# Patient Record
Sex: MALE | Race: WHITE | Employment: FULL TIME | URBAN - METROPOLITAN AREA
[De-identification: names, ages, dates, MRNs, and addresses within clinical notes are randomized per-mention and may not be internally consistent; named-entity substitution may affect disease eponyms.]

---

## 2017-01-04 ENCOUNTER — OFFICE VISIT (OUTPATIENT)
Dept: FAMILY MEDICINE CLINIC | Age: 47
End: 2017-01-04

## 2017-01-04 VITALS
DIASTOLIC BLOOD PRESSURE: 70 MMHG | HEART RATE: 61 BPM | HEIGHT: 74 IN | BODY MASS INDEX: 26.82 KG/M2 | SYSTOLIC BLOOD PRESSURE: 110 MMHG | TEMPERATURE: 97.1 F | WEIGHT: 209 LBS | OXYGEN SATURATION: 100 % | RESPIRATION RATE: 16 BRPM

## 2017-01-04 DIAGNOSIS — H91.91 HEARING LOSS, RIGHT: ICD-10-CM

## 2017-01-04 DIAGNOSIS — Z23 ENCOUNTER FOR IMMUNIZATION: ICD-10-CM

## 2017-01-04 DIAGNOSIS — E78.2 MIXED HYPERLIPIDEMIA: ICD-10-CM

## 2017-01-04 DIAGNOSIS — H93.11 TINNITUS AURIUM, RIGHT: ICD-10-CM

## 2017-01-04 DIAGNOSIS — E03.9 ACQUIRED HYPOTHYROIDISM: Primary | ICD-10-CM

## 2017-01-04 NOTE — PROGRESS NOTES
Chief Complaint   Patient presents with    Thyroid Problem     lab result follow up     Cholesterol Problem     lab result follow up      1. Have you been to the ER, urgent care clinic since your last visit? Hospitalized since your last visit? No    2. Have you seen or consulted any other health care providers outside of the [address] [place] [address] since your last visit? Include any pap smears or colon screening.  No

## 2017-01-04 NOTE — MR AVS SNAPSHOT
Visit Information Date & Time Provider Department Dept. Phone Encounter #  
 1/4/2017 10:45 AM Philip Villar MD [address] [place] [address] [phone] [id number] Follow-up Instructions Return in about 3 months (around 4/4/2017) for hyperlipidemia follow up. Your Appointments 9/13/2017  9:00 AM  
COMPLETE PHYSICAL with Philip Villar MD  
[facility]) Appt Note: complete fasting physical  
 [address] [address]  
[phone]  
  
   
 [address] [address] Upcoming Health Maintenance Date Due DTaP/Tdap/Td series (2 - Td) 1/4/2027 Allergies as of 1/4/2017  Review Complete On: 1/4/2017 By: Philip Villar MD  
 No Known Allergies Current Immunizations  Reviewed on 1/4/2017 Name Date Influenza Vaccine (Quad) PF 1/4/2017 Td, Adsorbed PF 8/31/2016 Reviewed by Johna Cushing, LPN on 9/6/7751 at 29:62 AM  
You Were Diagnosed With   
  
 Codes Comments Acquired hypothyroidism    -  Primary ICD-10-CM: E03.9 ICD-9-CM: 244.9 Mixed hyperlipidemia     ICD-10-CM: E78.2 ICD-9-CM: 272.2 Tinnitus aurium, right     ICD-10-CM: H93.11 ICD-9-CM: 388.31 Hearing loss, right     ICD-10-CM: H91.91 
ICD-9-CM: 389.9 Encounter for immunization     ICD-10-CM: R09 ICD-9-CM: V03.89 Vitals BP Pulse Temp Resp Height(growth percentile) Weight(growth percentile) 110/70 (BP 1 Location: Left arm, BP Patient Position: Sitting) 61 97.1 °F (36.2 °C) (Oral) 16 6' 2\" (1.88 m) 209 lb (94.8 kg) SpO2 BMI Smoking Status 100% 26.83 kg/m2 Never Smoker Vitals History BMI and BSA Data Body Mass Index Body Surface Area  
 26.83 kg/m 2 2.22 m 2 Preferred Pharmacy Pharmacy Name Phone [id number] [place], [address] [facility] [phone] Your Updated Medication List  
  
 This list is accurate as of: 1/4/17 11:51 AM.  Always use your most recent med list.  
  
  
  
  
 atorvastatin 10 mg tablet Commonly known as:  LIPITOR Take 1 Tab by mouth daily. levothyroxine 25 mcg tablet Commonly known as:  SYNTHROID Take 1 Tab by mouth Daily (before breakfast). Follow-up Instructions Return in about 3 months (around 4/4/2017) for hyperlipidemia follow up. To-Do List   
 01/04/2017 Imaging:  MRI BRAIN WO CONT   
  
 02/13/2017 Lab:  LIPID PANEL   
  
 02/13/2017 Lab:  METABOLIC PANEL, COMPREHENSIVE Referral Information Referral ID Referred By Referred To  
  
 [id number] Sofia Wick, [address] [facility] Endocrinology   Osteoporosis [place], [address] [facility] Phone: [phone] Fax: [phone] Visits Status Start Date End Date 1 New Request 1/4/17 1/4/18 If your referral has a status of pending review or denied, additional information will be sent to support the outcome of this decision. Introducing [facility]! Dear Devon Dumont: Thank you for requesting a [email or web address].[employer] account. Our records indicate that you already have an active [email or web address] account. You can access your account anytime at [email or web address] Did you know that you can access your hospital and ER discharge instructions at any time in [email or web address]? You can also review all of your test results from your hospital stay or ER visit. Additional Information If you have questions, please visit the Frequently Asked Questions section of the [email or web address] website at [email or web address] Remember, [email or web address] is NOT to be used for urgent needs. For medical emergencies, dial 911. Now available from your iPhone and Android! Please provide this summary of care documentation to your next provider. Your primary care clinician is listed as David Renoer. If you have any questions after today's visit, please call [phone].

## 2017-01-04 NOTE — PROGRESS NOTES
HISTORY OF PRESENT ILLNESS  Mamie Soulier is a 55 y.o. male. Blood pressure 110/70, pulse 61, temperature 97.1 °F (36.2 °C), temperature source Oral, resp. rate 16, height 6' 2\" (1.88 m), weight 209 lb (94.8 kg), SpO2 100 %. Body mass index is 26.83 kg/(m^2). Chief Complaint   Patient presents with    Thyroid Problem     lab result follow up     Cholesterol Problem     lab result follow up       HPI   Mamie Soulier 55 y.o. male  presents to the office today for a follow up. Bp at office today 110/70. Hyperlipidemia: Lipid panel on 11/17/16 notable for total cholesterol 244, , HDL 48, and triglycerides 126. Pt continues with Lipitor 10 mg daily and started this medication two months ago. LDL is elevated at 171 per labs on 11/17/16. Advised pt to continue with current regimen and will reassess his levels in two months. Hypothyroidism: Last TSH 4.490 per labs on 11/17/16. Pt continues with Synthroid 25 mcg daily. He notes he still has fatigue, but his cognitive issues has improved. Pt notes he has scheduled appointment with endocrinology (Dr. Dariel Hale) on 02/09/17. Thyroid is presumed stable, will assess levels today. BL tinnitus: Pt notes worsening R>L tinnitus. This has been ongoing for awhile and pt also hears a \"chirping\" noise in his right ear. He has seen Dr. Alice Evans (ENT) for evaluation. Pt advised to complete brain MRI to rule out any abnormality. Current Outpatient Prescriptions   Medication Sig Dispense Refill    levothyroxine (SYNTHROID) 25 mcg tablet Take 1 Tab by mouth Daily (before breakfast). 90 Tab 1    atorvastatin (LIPITOR) 10 mg tablet Take 1 Tab by mouth daily. 90 Tab 1     No Known Allergies  History reviewed. No pertinent past medical history.   Past Surgical History   Procedure Laterality Date    Hx vasectomy       Family History   Problem Relation Age of Onset    High Cholesterol Father     Migraines Sister     Thyroid Disease Sister      Social History   Substance Use Topics    Smoking status: Never Smoker    Smokeless tobacco: Never Used    Alcohol use Yes        Review of Systems   Constitutional: Positive for malaise/fatigue. HENT: Positive for tinnitus (R>L). Eyes: Negative for blurred vision. Respiratory: Negative for shortness of breath. Cardiovascular: Negative for chest pain and leg swelling. Musculoskeletal: Negative. Neurological: Negative. Negative for dizziness and headaches. All other systems reviewed and are negative. Physical Exam   Constitutional: He is oriented to person, place, and time. He appears well-developed and well-nourished. HENT:   Head: Normocephalic and atraumatic. Neck: Carotid bruit is not present. Cardiovascular: Normal rate, regular rhythm, normal heart sounds and intact distal pulses. Exam reveals no gallop and no friction rub. No murmur heard. Pulmonary/Chest: Effort normal and breath sounds normal. No respiratory distress. He has no wheezes. He has no rales. He exhibits no tenderness. Neurological: He is alert and oriented to person, place, and time. Psychiatric: He has a normal mood and affect. His behavior is normal. Judgment and thought content normal.   Nursing note and vitals reviewed. ASSESSMENT and PLAN  Chau Sargent was seen today for thyroid problem and cholesterol problem. Diagnoses and all orders for this visit:    Acquired hypothyroidism  -     REFERRAL TO ENDOCRINOLOGY (Dr. Elkin Roman)   -     TSH 3RD GENERATION  - Thyroid is presumed stable, will assess levels today. Pt has scheduled appointment with endocrinology (Dr. Jennifer Spears) for February 2017. Mixed hyperlipidemia  -     LIPID PANEL; Future  -     METABOLIC PANEL, COMPREHENSIVE; Future  - LDL is elevated at 171 per labs on 11/17/16. Will assess levels in two months. Continue with Lipitor.      Tinnitus aurium, right  -     MRI BRAIN WO CONT; Future  - Will complete brain MRI to rule out any abnormality    Hearing loss, right  -     MRI BRAIN WO CONT; Future  - See above    Encounter for immunization  -     Influenza virus vaccine (QUADRIVALENT PRES FREE SYRINGE) IM 3 years and older      Follow-up Disposition:  Return in about 3 months (around 4/4/2017) for hyperlipidemia follow up. Medication risks/benefits/costs/interactions/alternatives discussed with patient. Advised patient to call back or return to office if symptoms worsen/change/persist.  If patient cannot reach us or should anything more severe/urgent arise he/she should proceed directly to the nearest emergency department. Discussed expected course/resolution/complications of diagnosis in detail with patient. Patient given a written after visit summary which includes her diagnoses, current medications and vitals. Patient expressed understanding with the diagnosis and plan. Written by meek Giron, as dictated by Jay Morel M.D.    I have reviewed and agree with the above note and have made corrections where appropriate, Dr. Pauline Kawasaki, MD

## 2017-01-05 LAB — TSH SERPL DL<=0.005 MIU/L-ACNC: 4.44 UIU/ML (ref 0.45–4.5)

## 2017-01-05 NOTE — PROGRESS NOTES
[phone] ([place]) attempted to call patient no answer left message to call us back in regards to his labs

## 2017-01-05 NOTE — PROGRESS NOTES
Increase synthroid to 50 mcg daily  Please call in #30 tabs  5 Refills (name brand only)    Ask him to double up on his remaining tabs  Make sure pt gets name brand    Pt needs to have TSH rechecked in 4 weeks

## 2017-01-10 ENCOUNTER — HOSPITAL ENCOUNTER (OUTPATIENT)
Dept: MRI IMAGING | Age: 47
Discharge: HOME | End: 2017-01-10
Payer: COMMERCIAL

## 2017-01-10 DIAGNOSIS — H93.11 TINNITUS AURIUM, RIGHT: ICD-10-CM

## 2017-01-10 DIAGNOSIS — H91.91 HEARING LOSS, RIGHT: ICD-10-CM

## 2017-01-10 PROCEDURE — 70551 MRI BRAIN STEM W/O DYE: CPT

## 2017-01-10 NOTE — PROGRESS NOTES
Normal MRI brain but if symptoms continue pt needs to see ENT again we may need to do MRI with contrast

## 2017-01-12 ENCOUNTER — TELEPHONE (OUTPATIENT)
Dept: FAMILY MEDICINE CLINIC | Age: 47
End: 2017-01-12

## 2017-01-13 RX ORDER — LEVOTHYROXINE SODIUM 50 UG/1
50 TABLET ORAL
Qty: 30 TAB | Refills: 5 | Status: SHIPPED | OUTPATIENT
Start: 2017-01-13 | End: 2017-05-10

## 2017-01-13 NOTE — PROGRESS NOTES
Called pt, returned call, adv of MRI results and to follow up with ENT for continuing tinnitis. Patient/ Caller given an opportunity to ask questions, repeated information, and verbalized understanding.

## 2017-01-13 NOTE — PROGRESS NOTES
Patient returned call spoke to him regarding MRI and doing reflex order of Synthroid, not levothyroxine, [facility]. Patient/ Caller given an opportunity to ask questions, repeated information, and verbalized understanding.

## 2017-02-03 DIAGNOSIS — E78.2 MIXED HYPERLIPIDEMIA: ICD-10-CM

## 2017-02-04 LAB
ALBUMIN SERPL-MCNC: 4.7 G/DL (ref 3.5–5.5)
ALBUMIN/GLOB SERPL: 2.1 {RATIO} (ref 1.1–2.5)
ALP SERPL-CCNC: 55 IU/L (ref 39–117)
ALT SERPL-CCNC: 25 IU/L (ref 0–44)
AST SERPL-CCNC: 21 IU/L (ref 0–40)
BILIRUB SERPL-MCNC: 0.4 MG/DL (ref 0–1.2)
BUN SERPL-MCNC: 16 MG/DL (ref 6–24)
BUN/CREAT SERPL: 18 (ref 9–20)
CALCIUM SERPL-MCNC: 9.7 MG/DL (ref 8.7–10.2)
CHLORIDE SERPL-SCNC: 102 MMOL/L (ref 96–106)
CO2 SERPL-SCNC: 24 MMOL/L (ref 18–29)
CREAT SERPL-MCNC: 0.91 MG/DL (ref 0.76–1.27)
GLOBULIN SER CALC-MCNC: 2.2 G/DL (ref 1.5–4.5)
GLUCOSE SERPL-MCNC: 80 MG/DL (ref 65–99)
POTASSIUM SERPL-SCNC: 4.1 MMOL/L (ref 3.5–5.2)
PROT SERPL-MCNC: 6.9 G/DL (ref 6–8.5)
SODIUM SERPL-SCNC: 144 MMOL/L (ref 134–144)

## 2017-05-10 ENCOUNTER — OFFICE VISIT (OUTPATIENT)
Dept: FAMILY MEDICINE CLINIC | Age: 47
End: 2017-05-10

## 2017-05-10 VITALS
BODY MASS INDEX: 27.23 KG/M2 | RESPIRATION RATE: 15 BRPM | TEMPERATURE: 98.6 F | HEART RATE: 39 BPM | WEIGHT: 212.2 LBS | HEIGHT: 74 IN | SYSTOLIC BLOOD PRESSURE: 110 MMHG | DIASTOLIC BLOOD PRESSURE: 83 MMHG | OXYGEN SATURATION: 98 %

## 2017-05-10 DIAGNOSIS — E78.2 MIXED HYPERLIPIDEMIA: Primary | ICD-10-CM

## 2017-05-10 DIAGNOSIS — E66.3 OVERWEIGHT (BMI 25.0-29.9): ICD-10-CM

## 2017-05-10 DIAGNOSIS — F98.8 ADD (ATTENTION DEFICIT DISORDER): ICD-10-CM

## 2017-05-10 DIAGNOSIS — E03.9 ACQUIRED HYPOTHYROIDISM: ICD-10-CM

## 2017-05-10 RX ORDER — LEVOTHYROXINE SODIUM 100 UG/1
TABLET ORAL
Refills: 3 | COMMUNITY
Start: 2017-04-19 | End: 2017-11-29

## 2017-05-10 NOTE — MR AVS SNAPSHOT
Visit Information Date & Time Provider Department Dept. Phone Encounter #  
 5/10/2017 12:15 PM Jean-Claude Rios MD [address] [place] [address] [phone] [id number] Follow-up Instructions Return in about 1 month (around 6/10/2017) for ADD. Your Appointments 9/13/2017  9:00 AM  
COMPLETE PHYSICAL with Jean-Claude Rios MD  
[facility]) Appt Note: complete fasting physical  
 [address] [address]  
[phone]  
  
   
 [address] [address] Upcoming Health Maintenance Date Due INFLUENZA AGE 9 TO ADULT 8/1/2017 DTaP/Tdap/Td series (2 - Td) 1/4/2027 Allergies as of 5/10/2017  Review Complete On: 5/10/2017 By: Jean-Claude Rios MD  
 No Known Allergies Current Immunizations  Reviewed on 1/4/2017 Name Date Influenza Vaccine (Quad) PF 1/4/2017 Td, Adsorbed PF 8/31/2016 Not reviewed this visit You Were Diagnosed With   
  
 Codes Comments Mixed hyperlipidemia    -  Primary ICD-10-CM: X05.6 ICD-9-CM: 272.2 Acquired hypothyroidism     ICD-10-CM: E03.9 ICD-9-CM: 244.9 ADD (attention deficit disorder)     ICD-10-CM: F98.8 ICD-9-CM: 314.00 Overweight (BMI 25.0-29. 9)     ICD-10-CM: N58.0 ICD-9-CM: 278.02 Vitals BP Pulse Temp Resp Height(growth percentile) Weight(growth percentile) 110/83 (BP 1 Location: Left arm, BP Patient Position: Sitting) (!) 39 98.6 °F (37 °C) (Oral) 15 6' 2\" (1.88 m) 212 lb 3.2 oz (96.3 kg) SpO2 BMI Smoking Status 98% 27.24 kg/m2 Never Smoker Vitals History BMI and BSA Data Body Mass Index Body Surface Area  
 27.24 kg/m 2 2.24 m 2 Preferred Pharmacy Pharmacy Name Phone [id number], [address] [place] [address] [phone] Your Updated Medication List  
  
   
 This list is accurate as of: 5/10/17  1:20 PM.  Always use your most recent med list.  
  
  
  
  
 atorvastatin 10 mg tablet Commonly known as:  LIPITOR Take 1 Tab by mouth daily. Lisdexamfetamine 40 mg capsule Commonly known as:  VYVANSE Take 1 Cap (40 mg total) by mouth daily. Max Daily Amount: 40 mg  
  
 UNITHROID 100 mcg tablet Generic drug:  levothyroxine TAKE 1 TABLET BY MOUTH DAILY Prescriptions Printed Refills Lisdexamfetamine (VYVANSE) 40 mg capsule 0 Sig: Take 1 Cap (40 mg total) by mouth daily. Max Daily Amount: 40 mg  
 Class: Print Route: Oral  
  
Follow-up Instructions Return in about 1 month (around 6/10/2017) for ADD. [facility] & [facility] SERVICES! Dear Garry Walker: Thank you for requesting a Scopix account. Our records indicate that you already have an active Scopix account. You can access your account anytime at [email or web address]. [email or web address] / [employer] Did you know that you can access your hospital and ER discharge instructions at any time in Scopix? You can also review all of your test results from your hospital stay or ER visit. Additional Information If you have questions, please visit the Frequently Asked Questions section of the Scopix website at [email or web address] / [employer]/. Remember, Scopix is NOT to be used for urgent needs. For medical emergencies, dial 911. Now available from your iPhone and Android! Please provide this summary of care documentation to your next provider. Your primary care clinician is listed as Trever Gill. If you have any questions after today's visit, please call [phone].

## 2017-05-10 NOTE — PROGRESS NOTES
HISTORY OF PRESENT ILLNESS  Rey Bills is a 55 y.o. male. Blood pressure 110/83, pulse (!) 39, temperature 98.6 °F (37 °C), temperature source Oral, resp. rate 15, height 6' 2\" (1.88 m), weight 212 lb 3.2 oz (96.3 kg), SpO2 98 %. Body mass index is 27.24 kg/(m^2). Chief Complaint   Patient presents with    Cholesterol Problem      HPI   Rey Bills 55 y.o. male  presents to the office today for a follow up on chronic conditions. Bp at office today 110/83. Hyperlipidemia: Lipid panel on 11/17/16 notable for total cholesterol 244, , HDL 48, and triglycerides 126. He notes he has been off his Lipitor for past few months. He notes completing recent lipid panel with his endocrinologist, but states he does not remember the levels. LDL is elevated at 171 per labs on 11/17/16. Pt has been off his Lipitor in past few months due to his thyroid issues. He agrees to restart the medication. I have also advised pt to work on weight loss through diet and exercise. Hypothyroidism/Hashimoto's thyroiditis: Pt notes he has seen endocrinology (Dr. Oneida Gayle) on 03/23/17. Pt was switched from Synthroid to Unithroid 100 mcg daily. His thyroid function was stable and pt notes he will complete repeat thyroid ultrasound in two months. ADD: Pt notes history of focus and concentration issues. He has noticed trouble processing things and staying on track especially at work. He has seen psychologist (Dr. 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 I have discussed with pt on treatment options for ADD and pt agrees to start Vyvanse 40 mg daily. Reviewed with him on benefits, risks, and side effects of this medication. Pt will follow up with OV in one month.        Current Outpatient Prescriptions   Medication Sig Dispense Refill    UNITHROID 100 mcg tablet TAKE 1 TABLET BY MOUTH DAILY  3    Lisdexamfetamine (VYVANSE) 40 mg capsule Take 1 Cap (40 mg total) by mouth daily. Max Daily Amount: 40 mg 30 Cap 0    atorvastatin (LIPITOR) 10 mg tablet Take 1 Tab by mouth daily. 90 Tab 1     No Known Allergies  No past medical history on file. Past Surgical History:   Procedure Laterality Date    HX VASECTOMY       Family History   Problem Relation Age of Onset    High Cholesterol Father    Sona Cramer Migraines Sister     Thyroid Disease Sister      Social History   Substance Use Topics    Smoking status: Never Smoker    Smokeless tobacco: Never Used    Alcohol use Yes        Review of Systems   Constitutional: Negative. Negative for malaise/fatigue. Eyes: Negative for blurred vision. Respiratory: Negative for shortness of breath. Cardiovascular: Negative for chest pain and leg swelling. Musculoskeletal: Negative. Neurological: Negative. Negative for dizziness and headaches. All other systems reviewed and are negative. Physical Exam   Constitutional: He is oriented to person, place, and time. He appears well-developed and well-nourished. HENT:   Head: Normocephalic and atraumatic. Neck: Carotid bruit is not present. Cardiovascular: Normal rate, regular rhythm, normal heart sounds and intact distal pulses. Exam reveals no gallop and no friction rub. No murmur heard. Pulmonary/Chest: Effort normal and breath sounds normal. No respiratory distress. He has no wheezes. He has no rales. He exhibits no tenderness. Neurological: He is alert and oriented to person, place, and time. Psychiatric: He has a normal mood and affect. His behavior is normal. Judgment and thought content normal.   Nursing note and vitals reviewed. ASSESSMENT and PLAN  Zeynep Pelayo was seen today for cholesterol problem. Diagnoses and all orders for this visit:    Mixed hyperlipidemia  LDL is elevated at 171 per labs on 11/17/17. Pt has been off Lipitor in past few months due to his thyroid issues.  He agrees to restart the medication and will work on diet and exercise for improvement. Acquired hypothyroidism  Thyroid is presumed stable. Advised pt to continue follow up with endocrinology (Dr. Daphnie Ruby) as advised. ADD (attention deficit disorder)  -     Lisdexamfetamine (VYVANSE) 40 mg capsule; Take 1 Cap (40 mg total) by mouth daily. Max Daily Amount: 40 mg  - Will start pt on Vyvanse. Reviewed benefits, risks, and side effects of this medication. Pt will follow up with OV in one month. Overweight (BMI 25.0-29. 9)  I have reviewed/discussed the above normal BMI with the patient. I have recommended the following interventions: dietary management education, guidance, and counseling, encourage exercise, monitor weight and prescribed dietary intake . Follow-up Disposition:  Return in about 1 month (around 6/10/2017) for ADD. Medication risks/benefits/costs/interactions/alternatives discussed with patient. Advised patient to call back or return to office if symptoms worsen/change/persist.  If patient cannot reach us or should anything more severe/urgent arise he/she should proceed directly to the nearest emergency department. Discussed expected course/resolution/complications of diagnosis in detail with patient. Patient given a written after visit summary which includes her diagnoses, current medications and vitals. Patient expressed understanding with the diagnosis and plan. Written by meek Ramos, as dictated by Tate Jackson M.D.    I have reviewed and agree with the above note and have made corrections where appropriate, Dr. Trish Olguin MD

## 2017-05-10 NOTE — PROGRESS NOTES
Chief Complaint   Patient presents with    Cholesterol Problem       1. Have you been to the ER, urgent care clinic since your last visit? Hospitalized since your last visit? No    2. Have you seen or consulted any other health care providers outside of the [address] [place] [address] since your last visit? Include any pap smears or colon screening. No    Body mass index is 27.24 kg/(m^2).

## 2017-05-11 ENCOUNTER — TELEPHONE (OUTPATIENT)
Dept: FAMILY MEDICINE CLINIC | Age: 47
End: 2017-05-11

## 2017-05-11 NOTE — TELEPHONE ENCOUNTER
PA form for Vyvanse sent to [facility] via covermymeds and it was approve    Notified patient via my chart Vyvanse approved by his insurance

## 2017-06-12 ENCOUNTER — OFFICE VISIT (OUTPATIENT)
Dept: FAMILY MEDICINE CLINIC | Age: 47
End: 2017-06-12

## 2017-06-12 VITALS
HEART RATE: 80 BPM | HEIGHT: 74 IN | TEMPERATURE: 98.6 F | DIASTOLIC BLOOD PRESSURE: 72 MMHG | OXYGEN SATURATION: 97 % | SYSTOLIC BLOOD PRESSURE: 113 MMHG | BODY MASS INDEX: 26.34 KG/M2 | RESPIRATION RATE: 15 BRPM | WEIGHT: 205.2 LBS

## 2017-06-12 DIAGNOSIS — E03.9 ACQUIRED HYPOTHYROIDISM: ICD-10-CM

## 2017-06-12 DIAGNOSIS — E78.2 MIXED HYPERLIPIDEMIA: ICD-10-CM

## 2017-06-12 DIAGNOSIS — F98.8 ADD (ATTENTION DEFICIT DISORDER): Primary | ICD-10-CM

## 2017-06-12 NOTE — MR AVS SNAPSHOT
Visit Information Date & Time Provider Department Dept. Phone Encounter #  
 6/12/2017  2:45 PM Mario Fajardo MD [address] [place] [address] [phone] [id number] Follow-up Instructions Return in about 1 month (around 7/12/2017) for ADD. Your Appointments 9/13/2017  9:00 AM  
COMPLETE PHYSICAL with Mario Fajardo MD  
[facility]) Appt Note: complete fasting physical  
 [address] [address]  
[phone]  
  
   
 [address] [address] Upcoming Health Maintenance Date Due INFLUENZA AGE 9 TO ADULT 8/1/2017 DTaP/Tdap/Td series (2 - Td) 1/4/2027 Allergies as of 6/12/2017  Review Complete On: 6/12/2017 By: Mario Fajardo MD  
 No Known Allergies Current Immunizations  Reviewed on 1/4/2017 Name Date Influenza Vaccine (Quad) PF 1/4/2017 Td, Adsorbed PF 8/31/2016 Not reviewed this visit You Were Diagnosed With   
  
 Codes Comments ADD (attention deficit disorder)    -  Primary ICD-10-CM: F98.8 ICD-9-CM: 314.00 Mixed hyperlipidemia     ICD-10-CM: E78.2 ICD-9-CM: 272.2 Acquired hypothyroidism     ICD-10-CM: E03.9 ICD-9-CM: 001. 9 Vitals BP Pulse Temp Resp Height(growth percentile) Weight(growth percentile) 113/72 (BP 1 Location: Left arm, BP Patient Position: Sitting) 80 98.6 °F (37 °C) (Oral) 15 6' 2\" (1.88 m) 205 lb 3.2 oz (93.1 kg) SpO2 BMI Smoking Status 97% 26.35 kg/m2 Never Smoker Vitals History BMI and BSA Data Body Mass Index Body Surface Area  
 26.35 kg/m 2 2.2 m 2 Preferred Pharmacy Pharmacy Name Phone [id number]inne Patrica, [address] [place] [address] [phone] Your Updated Medication List  
  
   
This list is accurate as of: 6/12/17  3:55 PM.  Always use your most recent med list.  
  
  
  
  
 atorvastatin 10 mg tablet Commonly known as:  LIPITOR Take 1 Tab by mouth daily. Lisdexamfetamine 50 mg Cap Commonly known as:  VYVANSE Take 1 Cap (50 mg total) by mouth dailyIndications: ATTENTION-DEFICIT HYPERACTIVITY DISORDER. Max Daily Amount: 50 mg  
  
 UNITHROID 100 mcg tablet Generic drug:  levothyroxine TAKE 1 TABLET BY MOUTH DAILY Prescriptions Printed Refills Lisdexamfetamine (VYVANSE) 50 mg cap 0 Sig: Take 1 Cap (50 mg total) by mouth dailyIndications: ATTENTION-DEFICIT HYPERACTIVITY DISORDER. Max Daily Amount: 50 mg  
 Class: Print Route: Oral  
  
Follow-up Instructions Return in about 1 month (around 7/12/2017) for ADD. Introducing [facility] & HEALTH SERVICES! Dear Vickie Buckner: Thank you for requesting a [employer] account. Our records indicate that you already have an active [employer] Detection account. You can access your account anytime at [email or web address] Did you know that you can access your hospital and ER discharge instructions at any time in Molecular Detection? You can also review all of your test results from your hospital stay or ER visit. Additional Information If you have questions, please visit the Frequently Asked Questions section of the Molecular Detection website at [email or web address] Remember, Molecular Detection is NOT to be used for urgent needs. For medical emergencies, dial 911. Now available from your iPhone and Android! Please provide this summary of care documentation to your next provider. Your primary care clinician is listed as Etienne Mayes. If you have any questions after today's visit, please call [phone].

## 2017-06-12 NOTE — PROGRESS NOTES
HISTORY OF PRESENT ILLNESS  Kayden Quiroz is a 55 y.o. male. Blood pressure 113/72, pulse 80, temperature 98.6 °F (37 °C), temperature source Oral, resp. rate 15, height 6' 2\" (1.88 m), weight 205 lb 3.2 oz (93.1 kg), SpO2 97 %. Body mass index is 26.35 kg/(m^2). Chief Complaint   Patient presents with    Behavioral Problem      HPI   Kayden Quiroz 55 y.o. male  presents to the office today for a follow up on ADD. Bp at office today 113/72. ADD: Recall pt was seen at office on 05/10/17 and started on Vyvanse 40 mg daily. Pt notes Vyvanse has been helping with focus and processing issues at work. He states it has not affected his appetite, but he has lost about 7 lbs since last visit. He states Vyvanse lasts for only a few hours and tends to wear off during lunch time. I have advised pt we can increase his Vyvanse to 50 mg daily and may consider adding Guanfacine at his next follow up if the 50 mg dosage does not suffice. Advised pt to monitor his weight. Health maintenance: Last  per labs on 04/07/17. Pt notes he has recently started on Lipitor 10 mg daily and will have his lipids rechecked with endocrinologist (Dr. Rock Short) this month. Current Outpatient Prescriptions   Medication Sig Dispense Refill    UNITHROID 100 mcg tablet TAKE 1 TABLET BY MOUTH DAILY  3    Lisdexamfetamine (VYVANSE) 40 mg capsule Take 1 Cap (40 mg total) by mouth daily. Max Daily Amount: 40 mg 30 Cap 0    atorvastatin (LIPITOR) 10 mg tablet Take 1 Tab by mouth daily. 90 Tab 1     No Known Allergies  History reviewed. No pertinent past medical history.   Past Surgical History:   Procedure Laterality Date    HX VASECTOMY       Family History   Problem Relation Age of Onset    High Cholesterol Father    Eloina Sly Migraines Sister     Thyroid Disease Sister      Social History   Substance Use Topics    Smoking status: Never Smoker    Smokeless tobacco: Never Used    Alcohol use Yes        Review of Systems Constitutional: Negative. Negative for malaise/fatigue. Eyes: Negative for blurred vision. Respiratory: Negative for shortness of breath. Cardiovascular: Negative for chest pain and leg swelling. Musculoskeletal: Negative. Neurological: Negative. Negative for dizziness and headaches. All other systems reviewed and are negative. Physical Exam   Constitutional: He is oriented to person, place, and time. He appears well-developed and well-nourished. HENT:   Head: Normocephalic and atraumatic. Neck: Carotid bruit is not present. Cardiovascular: Normal rate, regular rhythm, normal heart sounds and intact distal pulses. Exam reveals no gallop and no friction rub. No murmur heard. Pulmonary/Chest: Effort normal and breath sounds normal. No respiratory distress. He has no wheezes. He has no rales. He exhibits no tenderness. Neurological: He is alert and oriented to person, place, and time. Psychiatric: He has a normal mood and affect. His behavior is normal. Judgment and thought content normal.   Nursing note and vitals reviewed. ASSESSMENT and PLAN  Lindsey Car was seen today for behavioral problem. Diagnoses and all orders for this visit:    ADD (attention deficit disorder)  -     Lisdexamfetamine (VYVANSE) 50 mg cap; Take 1 Cap (50 mg total) by mouth dailyIndications: ATTENTION-DEFICIT HYPERACTIVITY DISORDER. Max Daily Amount: 50 mg  - The Prescription Monitoring Program has been reviewed for recent activity regarding controlled substances for this patient. - Per pt, his Vyvanse 40 mg has been wearing off after a few hours so I have advised pt to increase dosage to 50 mg daily. Pt will follow up with OV in one month and we will consider adding Guanfacine to his regimen if the 50 mg dosage is not sufficient. Mixed hyperlipidemia  Pt was recently started on Lipitor 10 mg by his endocrinologist. His LDL was elevated at 161 per labs on 04/17/17.  He will have his cholesterol rechecked this month. Acquired hypothyroidism  Pt is on Unithroid and is followed by endocrinology (Dr. Ag Le)       Follow-up Disposition:  Return in about 1 month (around 7/12/2017) for ADD. Medication risks/benefits/costs/interactions/alternatives discussed with patient. Advised patient to call back or return to office if symptoms worsen/change/persist.  If patient cannot reach us or should anything more severe/urgent arise he/she should proceed directly to the nearest emergency department. Discussed expected course/resolution/complications of diagnosis in detail with patient. Patient given a written after visit summary which includes her diagnoses, current medications and vitals. Patient expressed understanding with the diagnosis and plan. Written by meek Telles, as dictated by Earlene Guthrie M.D.    I have reviewed and agree with the above note and have made corrections where appropriate, Dr. Melissa Damon MD

## 2017-06-12 NOTE — PROGRESS NOTES
Chief Complaint   Patient presents with    Behavioral Problem       1. Have you been to the ER, urgent care clinic since your last visit? Hospitalized since your last visit? No    2. Have you seen or consulted any other health care providers outside of the [facility] since your last visit? Include any pap smears or colon screening. No    Body mass index is 26.35 kg/(m^2).

## 2017-07-07 ENCOUNTER — LAB ONLY (OUTPATIENT)
Dept: FAMILY MEDICINE CLINIC | Age: 47
End: 2017-07-07

## 2017-07-07 DIAGNOSIS — E78.2 MIXED HYPERLIPIDEMIA: Primary | ICD-10-CM

## 2017-07-08 LAB
CHOLEST SERPL-MCNC: 169 MG/DL (ref 100–199)
HDLC SERPL-MCNC: 49 MG/DL
INTERPRETATION, 910389: NORMAL
LDLC SERPL CALC-MCNC: 100 MG/DL (ref 0–99)
TRIGL SERPL-MCNC: 101 MG/DL (ref 0–149)
VLDLC SERPL CALC-MCNC: 20 MG/DL (ref 5–40)

## 2017-07-12 ENCOUNTER — OFFICE VISIT (OUTPATIENT)
Dept: FAMILY MEDICINE CLINIC | Age: 47
End: 2017-07-12

## 2017-07-12 VITALS
WEIGHT: 201.8 LBS | DIASTOLIC BLOOD PRESSURE: 61 MMHG | HEART RATE: 65 BPM | TEMPERATURE: 97.9 F | OXYGEN SATURATION: 98 % | BODY MASS INDEX: 25.9 KG/M2 | HEIGHT: 74 IN | RESPIRATION RATE: 14 BRPM | SYSTOLIC BLOOD PRESSURE: 107 MMHG

## 2017-07-12 DIAGNOSIS — H73.92 TM (TYMPANIC MEMBRANE DISORDER), LEFT: ICD-10-CM

## 2017-07-12 DIAGNOSIS — F98.8 ADD (ATTENTION DEFICIT DISORDER): Primary | ICD-10-CM

## 2017-07-12 RX ORDER — CIPROFLOXACIN AND DEXAMETHASONE 3; 1 MG/ML; MG/ML
4 SUSPENSION/ DROPS AURICULAR (OTIC) 2 TIMES DAILY
Qty: 7.5 ML | Refills: 0 | Status: SHIPPED | OUTPATIENT
Start: 2017-07-12 | End: 2017-07-19

## 2017-07-12 NOTE — PROGRESS NOTES
HISTORY OF PRESENT ILLNESS  Lawrence Edwards is a 55 y.o. male. Blood pressure 107/61, pulse 65, temperature 97.9 °F (36.6 °C), temperature source Oral, resp. rate 14, height 6' 2\" (1.88 m), weight 201 lb 12.8 oz (91.5 kg), SpO2 98 %. Body mass index is 25.91 kg/(m^2). Chief Complaint   Patient presents with    Attention Deficit Disorder     1 month follow up    Labs     follow up on lab results    Ear Pain     left ear pain for the past 2 days        HPI  Lawrence Edwards 55 y.o. male  presents to the office today for follow up on ADD. ADD: Pt currently is on Vyvanse 50 mg daily. Pt notes that he feels like medication last only 5 hours after taking it. Pt denies any heart palpiations or noticable personality changes to himself or others. Pt states he feels \"jittery\" when he takes Vyvasnse \"too close\" to the Unithroid 100 mcg daily. Pt states that he doesn't take Vyvanse sometimes, usually on weekends. I have advised that pt take increased dosage of Vyvanse 60 mg daily. We will follow up in 1 month. Hyperlipidemia: Lipid panel on 07/07 notable for total cholesterol 169, , HDL 49, and triglycerides 101. Pt continues with Lipitor 10 mg daily. Stable, continue current regimen. Left Ear Pain: Pt notes left ear pain for past 2 days. Examination of pt showed earwax with associated irritation. I have advised that pt start administrating Ciprodex 4 drops BID in left ear for 7 days. Pt to notify me if symptoms progress or fail to improve. Current Outpatient Prescriptions   Medication Sig Dispense Refill    Lisdexamfetamine (VYVANSE) 60 mg capsule Take 1 Cap (60 mg total) by mouth dailyIndications: ATTENTION-DEFICIT HYPERACTIVITY DISORDER Earliest Fill Date: 7/12/17. Max Daily Amount: 60 mg 30 Cap 0    ciprofloxacin-dexamethasone (CIPRODEX) 0.3-0.1 % otic suspension Administer 4 Drops in left ear two (2) times a day for 7 days.  7.5 mL 0    UNITHROID 100 mcg tablet TAKE 1 TABLET BY MOUTH DAILY  3  atorvastatin (LIPITOR) 10 mg tablet Take 1 Tab by mouth daily. 90 Tab 1     No Known Allergies  History reviewed. No pertinent past medical history. Past Surgical History:   Procedure Laterality Date    HX VASECTOMY       Family History   Problem Relation Age of Onset    High Cholesterol Father    [facility] Migraines Sister     Thyroid Disease Sister      Social History   Substance Use Topics    Smoking status: Never Smoker    Smokeless tobacco: Never Used    Alcohol use Yes        Review of Systems   Constitutional: Negative. Negative for malaise/fatigue. HENT: Positive for ear pain (Left Ear). Eyes: Negative for blurred vision. Respiratory: Negative for shortness of breath. Cardiovascular: Negative for chest pain and leg swelling. Musculoskeletal: Negative. Neurological: Negative. Negative for dizziness and headaches. All other systems reviewed and are negative. Physical Exam   Constitutional: He is oriented to person, place, and time. He appears well-developed and well-nourished. HENT:   Head: Normocephalic and atraumatic. Left Ear: Tympanic membrane is erythematous (Slight). Neck: Carotid bruit is not present. Cardiovascular: Normal rate, regular rhythm, normal heart sounds and intact distal pulses. Exam reveals no gallop and no friction rub. No murmur heard. Pulmonary/Chest: Effort normal and breath sounds normal. No respiratory distress. He has no wheezes. He has no rales. He exhibits no tenderness. Neurological: He is alert and oriented to person, place, and time. Psychiatric: He has a normal mood and affect. His behavior is normal. Judgment and thought content normal.   Nursing note and vitals reviewed. ASSESSMENT and PLAN  Basilio Lozano was seen today for attention deficit disorder, labs and ear pain. Diagnoses and all orders for this visit:    ADD (attention deficit disorder)  -     Lisdexamfetamine (VYVANSE) 60 mg capsule;  Take 1 Cap (60 mg total) by mouth dailyIndications: ATTENTION-DEFICIT HYPERACTIVITY DISORDER Earliest Fill Date: 7/12/17. Max Daily Amount: 60 mg  - Stable. I have advised pt to start with increased dosage of Vyvanse 60 mg daily. We will follow up in 1 month. TM (tympanic membrane disorder), left  -     ciprofloxacin-dexamethasone (CIPRODEX) 0.3-0.1 % otic suspension; Administer 4 Drops in left ear two (2) times a day for 7 days.  - Pt states that he has acute TM irritation. I have recommended that pt start with Ciprodex application 4 times BID in left ear for 7 days. Follow-up Disposition:  Return in about 1 month (around 8/12/2017) for ADD. Medication risks/benefits/costs/interactions/alternatives discussed with patient. Advised patient to call back or return to office if symptoms worsen/change/persist.  If patient cannot reach us or should anything more severe/urgent arise he/she should proceed directly to the nearest emergency department. Discussed expected course/resolution/complications of diagnosis in detail with patient. Patient given a written after visit summary which includes her diagnoses, current medications and vitals. Patient expressed understanding with the diagnosis and plan. Written by meek Arvizu, as dictated by Faby Duke M.D.   I have reviewed and agree with the above note and have made corrections where appropriate, Dr. Charly Trimble MD

## 2017-07-12 NOTE — PROGRESS NOTES
1. Have you been to the ER, urgent care clinic since your last visit? Hospitalized since your last visit? No    2. Have you seen or consulted any other health care providers outside of the [address] [place] [address] since your last visit? Include any pap smears or colon screening.  No     Chief Complaint   Patient presents with    Attention Deficit Disorder     1 month follow up    Labs     follow up on lab results    Ear Pain     left ear pain for the past 2 days     Not fasting

## 2017-07-12 NOTE — MR AVS SNAPSHOT
Visit Information Date & Time Provider Department Dept. Phone Encounter #  
 7/12/2017  3:45 PM Erickson Silver  [facility] [phone] [id number] Follow-up Instructions Return in about 1 month (around 8/12/2017) for ADD. Your Appointments 8/9/2017  3:45 PM  
ROUTINE CARE with Erickson Silver MD  
[facility]) Appt Note: ADD follow up  
 [address] [facility] [address]  
[phone]  
  
   
 [address] [facility] [address]  
  
    
 9/13/2017  9:00 AM  
COMPLETE PHYSICAL with Erickson Silver MD  
[facility]) Appt Note: complete fasting physical  
 [address] [address]  
[phone]  
  
   
 [address] [address] Upcoming Health Maintenance Date Due INFLUENZA AGE 9 TO ADULT 8/1/2017 DTaP/Tdap/Td series (2 - Td) 1/4/2027 Allergies as of 7/12/2017  Review Complete On: 7/12/2017 By: Erickson Silver MD  
 No Known Allergies Current Immunizations  Reviewed on 1/4/2017 Name Date Influenza Vaccine (Quad) PF 1/4/2017 Td, Adsorbed PF 8/31/2016 Not reviewed this visit You Were Diagnosed With   
  
 Codes Comments ADD (attention deficit disorder)    -  Primary ICD-10-CM: F98.8 ICD-9-CM: 314.00 TM (tympanic membrane disorder), left     ICD-10-CM: H73.92 
ICD-9-CM: 384. 9 Vitals BP Pulse Temp Resp Height(growth percentile) Weight(growth percentile) 107/61 (BP 1 Location: Left arm, BP Patient Position: Sitting) 65 97.9 °F (36.6 °C) (Oral) 14 6' 2\" (1.88 m) 201 lb 12.8 oz (91.5 kg) SpO2 BMI Smoking Status 98% 25.91 kg/m2 Never Smoker Vitals History BMI and BSA Data Body Mass Index Body Surface Area  
 25.91 kg/m 2 2.19 m 2 Preferred Pharmacy Pharmacy Name Phone [id number] [address] [phone] Your Updated Medication List  
  
   
This list is accurate as of: 7/12/17  5:06 PM.  Always use your most recent med list.  
  
  
  
  
 atorvastatin 10 mg tablet Commonly known as:  LIPITOR Take 1 Tab by mouth daily. ciprofloxacin-dexamethasone 0.3-0.1 % otic suspension Commonly known as:  Miesha Mainland Administer 4 Drops in left ear two (2) times a day for 7 days. Lisdexamfetamine 60 mg capsule Commonly known as:  VYVANSE Take 1 Cap (60 mg total) by mouth dailyIndications: ATTENTION-DEFICIT HYPERACTIVITY DISORDER Earliest Fill Date: 7/12/17. Max Daily Amount: 60 mg  
  
 UNITHROID 100 mcg tablet Generic drug:  levothyroxine TAKE 1 TABLET BY MOUTH DAILY Prescriptions Printed Refills Lisdexamfetamine (VYVANSE) 60 mg capsule 0 Sig: Take 1 Cap (60 mg total) by mouth dailyIndications: ATTENTION-DEFICIT HYPERACTIVITY DISORDER Earliest Fill Date: 7/12/17. Max Daily Amount: 60 mg  
 Class: Print Route: Oral  
  
Prescriptions Sent to Pharmacy Refills  
 ciprofloxacin-dexamethasone (CIPRODEX) 0.3-0.1 % otic suspension 0 Sig: Administer 4 Drops in left ear two (2) times a day for 7 days. Class: Normal  
 Pharmacy: [facility]/pharmacy [address] [place] [address] #: [phone] Route: Left Ear Follow-up Instructions Return in about 1 month (around 8/12/2017) for ADD. Introducing [facility]! Dear Tony Mercado: Thank you for requesting a [employer] account. Our records indicate that you already have an active [employer] account. You can access your account anytime at [email or web address] Did you know that you can access your hospital and ER discharge instructions at any time in [employer]? You can also review all of your test results from your hospital stay or ER visit. Additional Information If you have questions, please visit the Frequently Asked Questions section of the [employer] website at [email or web address] Remember, [employer] is NOT to be used for urgent needs. For medical emergencies, dial 911. Now available from your iPhone and Android! Please provide this summary of care documentation to your next provider. Your primary care clinician is listed as Halie Robles. If you have any questions after today's visit, please call [phone].

## 2017-08-09 ENCOUNTER — OFFICE VISIT (OUTPATIENT)
Dept: FAMILY MEDICINE CLINIC | Age: 47
End: 2017-08-09

## 2017-08-09 VITALS
TEMPERATURE: 97.3 F | RESPIRATION RATE: 16 BRPM | DIASTOLIC BLOOD PRESSURE: 70 MMHG | BODY MASS INDEX: 25.8 KG/M2 | SYSTOLIC BLOOD PRESSURE: 110 MMHG | OXYGEN SATURATION: 96 % | WEIGHT: 201 LBS | HEART RATE: 74 BPM | HEIGHT: 74 IN

## 2017-08-09 DIAGNOSIS — F98.8 ADD (ATTENTION DEFICIT DISORDER): Primary | ICD-10-CM

## 2017-08-09 DIAGNOSIS — Z00.00 PHYSICAL EXAM: ICD-10-CM

## 2017-08-09 NOTE — PROGRESS NOTES
HISTORY OF PRESENT ILLNESS  Reena Ansari is a 52 y.o. male. Blood pressure 110/70, pulse 74, temperature 97.3 °F (36.3 °C), temperature source Oral, resp. rate 16, height 6' 2\" (1.88 m), weight 201 lb (91.2 kg), SpO2 96 %. Body mass index is 25.81 kg/(m^2). Chief Complaint   Patient presents with    Attention Deficit Disorder        HPI  Reena Ansari 52 y.o. male  presents to the office today for follow up on ADD. ADD: Pt currently takes Vyvanse 60 mg daily. Pt states that he has problems with sleep, stating that either he has trouble going to sleep or staying asleep. Pt states that he has felt \"hyper-focused\". Pt thinks that he feels that problems has coincided with medication change and wants to decrease dosage. Pt states that he takes medications on the weekend. Pt states that when he was on 50 mg daily, he felt that he didn't last all day. Pt has symptoms of slight hyperactivity at higher dose. I have advised pt to take Unithroid 100 mcg daily at 6 AM and then take Vyvanse 60 mg after breakfast. I have also advised pt to pause taking medications on weekend. We may try another medication if problems persist. We will follow up in 1 month. Pt to notify me if symptoms progress or fail to improve. The Prescription Monitoring Program has been reviewed for recent activity regarding controlled substances for this patient. I discussed addictive potential of this medication with patient and advised to take this medication on an as need basis only. Current Outpatient Prescriptions   Medication Sig Dispense Refill    Lisdexamfetamine (VYVANSE) 60 mg capsule Take 1 Cap (60 mg total) by mouth daily. Max Daily Amount: 60 mg 30 Cap 0    Lisdexamfetamine (VYVANSE) 60 mg capsule Take 1 Cap (60 mg total) by mouth dailyIndications: ATTENTION-DEFICIT HYPERACTIVITY DISORDER Earliest Fill Date: 7/12/17.   Max Daily Amount: 60 mg 30 Cap 0    UNITHROID 100 mcg tablet TAKE 1 TABLET BY MOUTH DAILY  3    atorvastatin (LIPITOR) 10 mg tablet Take 1 Tab by mouth daily. 90 Tab 1     No Known Allergies  History reviewed. No pertinent past medical history. Past Surgical History:   Procedure Laterality Date    HX VASECTOMY       Family History   Problem Relation Age of Onset    High Cholesterol Father    Lucia Mac Migraines Sister     Thyroid Disease Sister      Social History   Substance Use Topics    Smoking status: Never Smoker    Smokeless tobacco: Never Used    Alcohol use Yes        Review of Systems   Constitutional: Negative. Negative for malaise/fatigue. Eyes: Negative for blurred vision. Respiratory: Negative for shortness of breath. Cardiovascular: Negative for chest pain and leg swelling. Musculoskeletal: Negative. Neurological: Negative. Negative for dizziness and headaches. All other systems reviewed and are negative. Physical Exam   Constitutional: He is oriented to person, place, and time. He appears well-developed and well-nourished. HENT:   Head: Normocephalic and atraumatic. Neck: Carotid bruit is not present. Cardiovascular: Normal rate, regular rhythm, normal heart sounds and intact distal pulses. Exam reveals no gallop and no friction rub. No murmur heard. Pulmonary/Chest: Effort normal and breath sounds normal. No respiratory distress. He has no wheezes. He has no rales. He exhibits no tenderness. Neurological: He is alert and oriented to person, place, and time. Psychiatric: He has a normal mood and affect. His behavior is normal. Judgment and thought content normal.   Nursing note and vitals reviewed. ASSESSMENT and PLAN  Diagnoses and all orders for this visit:    1. ADD (attention deficit disorder)  -     Lisdexamfetamine (VYVANSE) 60 mg capsule; Take 1 Cap (60 mg total) by mouth daily. Max Daily Amount: 60 mg        - Pt has symptoms of slight hyperactivity at higher dose.  I have advised pt to take Unithroid 100 mcg daily at 6 AM and then take Vyvanse 60 mg after breakfast. I have also advised pt to pause taking medications on weekend. We will follow up in 1 month for physical. We may try another medication if problems persist. Pt to notify me if symptoms progress or fail to improve. - The Prescription Monitoring Program has been reviewed for recent activity regarding controlled substances for this patient. - I discussed addictive potential of this medication with patient and advised to take this medication on an as need basis only. 2. Physical exam  -     CBC WITH AUTOMATED DIFF; Future  -     LIPID PANEL; Future  -     METABOLIC PANEL, COMPREHENSIVE; Future  -     TSH 3RD GENERATION; Future  -     T4, FREE; Future  -     VITAMIN D, 25 HYDROXY; Future  -     URINALYSIS W/MICROSCOPIC; Future    Follow-up Disposition:  Return if symptoms worsen or fail to improve, for ADD. Medication risks/benefits/costs/interactions/alternatives discussed with patient. Advised patient to call back or return to office if symptoms worsen/change/persist.  If patient cannot reach us or should anything more severe/urgent arise he/she should proceed directly to the nearest emergency department. Discussed expected course/resolution/complications of diagnosis in detail with patient. Patient given a written after visit summary which includes her diagnoses, current medications and vitals. Patient expressed understanding with the diagnosis and plan. Written by meek Pastor, as dictated by Yessy Lopez M.D.   I have reviewed and agree with the above note and have made corrections where appropriate, Dr. Jose Pritchett MD

## 2017-08-09 NOTE — PROGRESS NOTES
Chief Complaint   Patient presents with    Attention Deficit Disorder       Reviewed Record in preparation for visit and have obtained necessary documentation. Identified pt with two pt identifiers (Name @ )    Health Maintenance Due   Topic    INFLUENZA AGE 9 TO ADULT          1. Have you been to the ER, urgent care clinic since your last visit? Hospitalized since your last visit? No    2. Have you seen or consulted any other health care providers outside of the [address] [place] [address] since your last visit? Include any pap smears or colon screening.  No

## 2017-08-09 NOTE — MR AVS SNAPSHOT
Visit Information Date & Time Provider Department Dept. Phone Encounter #  
 8/9/2017  3:45 PM Christal Singh MD [facility] [phone] [id number] Follow-up Instructions Return if symptoms worsen or fail to improve, for ADD. Your Appointments 9/13/2017  9:00 AM  
COMPLETE PHYSICAL with Christal Singh MD  
[facility]) Appt Note: complete fasting physical  
 [address] [address]  
[phone]  
  
   
 [address] [address] Upcoming Health Maintenance Date Due INFLUENZA AGE 9 TO ADULT 8/1/2017 DTaP/Tdap/Td series (2 - Td) 1/4/2027 Allergies as of 8/9/2017  Review Complete On: 8/9/2017 By: Christal Singh MD  
 No Known Allergies Current Immunizations  Reviewed on 1/4/2017 Name Date Influenza Vaccine (Quad) PF 1/4/2017 Td, Adsorbed PF 8/31/2016 Not reviewed this visit You Were Diagnosed With   
  
 Codes Comments ADD (attention deficit disorder)    -  Primary ICD-10-CM: F98.8 ICD-9-CM: 314.00 Vitals BP Pulse Temp Resp Height(growth percentile) Weight(growth percentile) 110/70 (BP 1 Location: Right arm, BP Patient Position: Sitting) 74 97.3 °F (36.3 °C) (Oral) 16 6' 2\" (1.88 m) 201 lb (91.2 kg) SpO2 BMI Smoking Status 96% 25.81 kg/m2 Never Smoker Vitals History BMI and BSA Data Body Mass Index Body Surface Area  
 25.81 kg/m 2 2.18 m 2 Preferred Pharmacy Pharmacy Name Phone [id number] Dionne Cortez, [address] [facility] [phone] Your Updated Medication List  
  
   
This list is accurate as of: 8/9/17  4:41 PM.  Always use your most recent med list.  
  
  
  
  
 atorvastatin 10 mg tablet Commonly known as:  LIPITOR Take 1 Tab by mouth daily. * Lisdexamfetamine 60 mg capsule Commonly known as:  VYVANSE  
 Take 1 Cap (60 mg total) by mouth dailyIndications: ATTENTION-DEFICIT HYPERACTIVITY DISORDER Earliest Fill Date: 7/12/17. Max Daily Amount: 60 mg * Lisdexamfetamine 60 mg capsule Commonly known as:  VYVANSE Take 1 Cap (60 mg total) by mouth daily. Max Daily Amount: 60 mg  
  
 UNITHROID 100 mcg tablet Generic drug:  levothyroxine TAKE 1 TABLET BY MOUTH DAILY * Notice: This list has 2 medication(s) that are the same as other medications prescribed for you. Read the directions carefully, and ask your doctor or other care provider to review them with you. Prescriptions Printed Refills Lisdexamfetamine (VYVANSE) 60 mg capsule 0 Sig: Take 1 Cap (60 mg total) by mouth daily. Max Daily Amount: 60 mg  
 Class: Print Route: Oral  
  
Follow-up Instructions Return if symptoms worsen or fail to improve, for ADD. Introducing [facility] SERVICES! Dear Adria Adler: Thank you for requesting a [employer] account. Our records indicate that you already have an active [employer] account. You can access your account anytime at [email or web address] Did you know that you can access your hospital and ER discharge instructions at any time in [employer]? You can also review all of your test results from your hospital stay or ER visit. Additional Information If you have questions, please visit the Frequently Asked Questions section of the [employer] website at [email or web address] Remember, [employer] is NOT to be used for urgent needs. For medical emergencies, dial 911. Now available from your iPhone and Android! Please provide this summary of care documentation to your next provider. Your primary care clinician is listed as Kavon Gomez. If you have any questions after today's visit, please call [phone].

## 2017-09-06 DIAGNOSIS — Z00.00 PHYSICAL EXAM: ICD-10-CM

## 2017-09-07 LAB
25(OH)D3+25(OH)D2 SERPL-MCNC: 32.6 NG/ML (ref 30–100)
ALBUMIN SERPL-MCNC: 4.5 G/DL (ref 3.5–5.5)
ALBUMIN/GLOB SERPL: 2 {RATIO} (ref 1.2–2.2)
ALP SERPL-CCNC: 55 IU/L (ref 39–117)
ALT SERPL-CCNC: 24 IU/L (ref 0–44)
APPEARANCE UR: CLEAR
AST SERPL-CCNC: 18 IU/L (ref 0–40)
BACTERIA #/AREA URNS HPF: NORMAL /[HPF]
BASOPHILS # BLD AUTO: 0 X10E3/UL (ref 0–0.2)
BASOPHILS NFR BLD AUTO: 1 %
BILIRUB SERPL-MCNC: 0.5 MG/DL (ref 0–1.2)
BILIRUB UR QL STRIP: NEGATIVE
BUN SERPL-MCNC: 15 MG/DL (ref 6–24)
BUN/CREAT SERPL: 16 (ref 9–20)
CALCIUM SERPL-MCNC: 9.3 MG/DL (ref 8.7–10.2)
CASTS URNS QL MICRO: NORMAL /LPF
CHLORIDE SERPL-SCNC: 98 MMOL/L (ref 96–106)
CHOLEST SERPL-MCNC: 200 MG/DL (ref 100–199)
CO2 SERPL-SCNC: 22 MMOL/L (ref 18–29)
COLOR UR: YELLOW
CREAT SERPL-MCNC: 0.96 MG/DL (ref 0.76–1.27)
EOSINOPHIL # BLD AUTO: 0.2 X10E3/UL (ref 0–0.4)
EOSINOPHIL NFR BLD AUTO: 4 %
EPI CELLS #/AREA URNS HPF: NORMAL /HPF
ERYTHROCYTE [DISTWIDTH] IN BLOOD BY AUTOMATED COUNT: 13.9 % (ref 12.3–15.4)
GLOBULIN SER CALC-MCNC: 2.2 G/DL (ref 1.5–4.5)
GLUCOSE SERPL-MCNC: 99 MG/DL (ref 65–99)
GLUCOSE UR QL: NEGATIVE
HCT VFR BLD AUTO: 42.7 % (ref 37.5–51)
HDLC SERPL-MCNC: 58 MG/DL
HGB BLD-MCNC: 14.1 G/DL (ref 12.6–17.7)
HGB UR QL STRIP: NEGATIVE
IMM GRANULOCYTES # BLD: 0 X10E3/UL (ref 0–0.1)
IMM GRANULOCYTES NFR BLD: 0 %
INTERPRETATION, 910389: NORMAL
KETONES UR QL STRIP: NEGATIVE
LDLC SERPL CALC-MCNC: 119 MG/DL (ref 0–99)
LEUKOCYTE ESTERASE UR QL STRIP: NEGATIVE
LYMPHOCYTES # BLD AUTO: 1.6 X10E3/UL (ref 0.7–3.1)
LYMPHOCYTES NFR BLD AUTO: 32 %
MCH RBC QN AUTO: 29.4 PG (ref 26.6–33)
MCHC RBC AUTO-ENTMCNC: 33 G/DL (ref 31.5–35.7)
MCV RBC AUTO: 89 FL (ref 79–97)
MICRO URNS: NORMAL
MICRO URNS: NORMAL
MONOCYTES # BLD AUTO: 0.4 X10E3/UL (ref 0.1–0.9)
MONOCYTES NFR BLD AUTO: 8 %
NEUTROPHILS # BLD AUTO: 2.9 X10E3/UL (ref 1.4–7)
NEUTROPHILS NFR BLD AUTO: 55 %
NITRITE UR QL STRIP: NEGATIVE
PH UR STRIP: 7 [PH] (ref 5–7.5)
PLATELET # BLD AUTO: 208 X10E3/UL (ref 150–379)
POTASSIUM SERPL-SCNC: 4.2 MMOL/L (ref 3.5–5.2)
PROT SERPL-MCNC: 6.7 G/DL (ref 6–8.5)
PROT UR QL STRIP: NEGATIVE
RBC # BLD AUTO: 4.79 X10E6/UL (ref 4.14–5.8)
RBC #/AREA URNS HPF: NORMAL /HPF
SODIUM SERPL-SCNC: 138 MMOL/L (ref 134–144)
SP GR UR: 1.01 (ref 1–1.03)
T4 FREE SERPL-MCNC: 1.37 NG/DL (ref 0.82–1.77)
TRIGL SERPL-MCNC: 113 MG/DL (ref 0–149)
TSH SERPL DL<=0.005 MIU/L-ACNC: 3.07 UIU/ML (ref 0.45–4.5)
UROBILINOGEN UR STRIP-MCNC: 0.2 MG/DL (ref 0.2–1)
VLDLC SERPL CALC-MCNC: 23 MG/DL (ref 5–40)
WBC # BLD AUTO: 5.2 X10E3/UL (ref 3.4–10.8)
WBC #/AREA URNS HPF: NORMAL /HPF

## 2017-09-13 ENCOUNTER — OFFICE VISIT (OUTPATIENT)
Dept: FAMILY MEDICINE CLINIC | Age: 47
End: 2017-09-13

## 2017-09-13 VITALS
DIASTOLIC BLOOD PRESSURE: 87 MMHG | OXYGEN SATURATION: 99 % | RESPIRATION RATE: 16 BRPM | HEART RATE: 72 BPM | TEMPERATURE: 97.7 F | SYSTOLIC BLOOD PRESSURE: 114 MMHG | HEIGHT: 74 IN | WEIGHT: 202 LBS | BODY MASS INDEX: 25.93 KG/M2

## 2017-09-13 DIAGNOSIS — E03.9 ACQUIRED HYPOTHYROIDISM: ICD-10-CM

## 2017-09-13 DIAGNOSIS — F98.8 ADD (ATTENTION DEFICIT DISORDER): ICD-10-CM

## 2017-09-13 DIAGNOSIS — Z23 ENCOUNTER FOR IMMUNIZATION: ICD-10-CM

## 2017-09-13 DIAGNOSIS — K92.1 BLOOD IN STOOL: ICD-10-CM

## 2017-09-13 DIAGNOSIS — Z00.00 PHYSICAL EXAM: Primary | ICD-10-CM

## 2017-09-13 DIAGNOSIS — E78.2 MIXED HYPERLIPIDEMIA: ICD-10-CM

## 2017-09-13 DIAGNOSIS — E66.3 OVERWEIGHT (BMI 25.0-29.9): ICD-10-CM

## 2017-09-13 RX ORDER — ATORVASTATIN CALCIUM 10 MG/1
10 TABLET, FILM COATED ORAL DAILY
Qty: 90 TAB | Refills: 1 | Status: SHIPPED | OUTPATIENT
Start: 2017-09-13 | End: 2018-03-14 | Stop reason: SDUPTHER

## 2017-09-13 RX ORDER — ACETAMINOPHEN 500 MG
TABLET ORAL DAILY
COMMUNITY

## 2017-09-13 RX ORDER — DEXMETHYLPHENIDATE HYDROCHLORIDE 30 MG/1
1 CAPSULE, EXTENDED RELEASE ORAL DAILY
Qty: 30 CAP | Refills: 0 | Status: SHIPPED | OUTPATIENT
Start: 2017-09-13 | End: 2017-10-11 | Stop reason: DRUGHIGH

## 2017-09-13 NOTE — PROGRESS NOTES
HISTORY OF PRESENT ILLNESS  Jennifer Egan is a 52 y.o. male. Blood pressure 114/87, pulse 72, temperature 97.7 °F (36.5 °C), temperature source Oral, resp. rate 16, height 6' 2\" (1.88 m), weight 202 lb (91.6 kg), SpO2 99 %. Body mass index is 25.94 kg/(m^2). Chief Complaint   Patient presents with    Complete Physical        HPI  Jennifer Egan 52 y.o. male  presents to the office today for complete physical.    Hyperlipidemia: Lipid panel on 09/06/17 notable for total cholesterol 200, , HDL 58, and triglycerides 113. Pt continues with atrovastatin 10mg daily. Pt reports a family history of heart disease. Pt states that his dad had a heart bypass. I advised pt to try and eat more vegetables. Will reassess lipids before next visit. ADD: Pt reports that the Vyvanse 60mg makes him emotional and gets anxious. Pt reports that he did not have these problems with the Vyvanse 50mg, but it runs out by the end of the day. I advised pt to try taking Focalin 30mg and stop the Vyvanse 60mg. Hypothyroidism: Pt's TSH levels were 3.070 on 09/06/17. Pt continues with Unithroid 100mg daily. Stable, continue current regimen. Vitamin- Deficiency: Pt's vitamin D levels were 32.6ng/mL on 09/06/17. Pt continues with vitamin D supplements of 2000units. Stable, continue current regimen. Health Maintenance: Pt reports that he got the influenza vaccine recently. Pt denies family hx of prostate or colon cancer. Pt reports some blood in the stool, but only happens sometimes. Pt states that he does not have hemorrhoids. I have advised pt to do a FIT test and if it comes back negative to follow up with a gastroenterologist. Pt was seeing Dr. Ilene Serrano (GI), but would like to be referred to someone else if needed.  I advised pt to follow up in 1 month after FIT test.                Current Outpatient Prescriptions   Medication Sig Dispense Refill    Cholecalciferol, Vitamin D3, (VITAMIN D3) 2,000 unit cap capsule Take  by mouth daily.  atorvastatin (LIPITOR) 10 mg tablet Take 1 Tab by mouth daily. 90 Tab 1    Dexmethylphenidate (FOCALIN XR) 30 mg BP50 Take 1 Cap by mouth daily. Max Daily Amount: 1 Cap 30 Cap 0    UNITHROID 100 mcg tablet TAKE 1 TABLET BY MOUTH DAILY  3     No Known Allergies  History reviewed. No pertinent past medical history. Past Surgical History:   Procedure Laterality Date    HX VASECTOMY       Family History   Problem Relation Age of Onset    High Cholesterol Father    [facility] Migraines Sister     Thyroid Disease Sister      Social History   Substance Use Topics    Smoking status: Never Smoker    Smokeless tobacco: Never Used    Alcohol use Yes        Review of Systems   Constitutional: Negative. Negative for chills, diaphoresis, fever, malaise/fatigue and weight loss. HENT: Negative for congestion, ear discharge, ear pain, hearing loss, nosebleeds, sore throat and tinnitus. Eyes: Negative for blurred vision, double vision, photophobia, pain, discharge and redness. Respiratory: Negative for cough, hemoptysis, sputum production, shortness of breath, wheezing and stridor. Cardiovascular: Negative for chest pain, palpitations, orthopnea, claudication, leg swelling and PND. Gastrointestinal: Negative for abdominal pain, blood in stool, constipation, diarrhea, heartburn, melena, nausea and vomiting. Genitourinary: Negative for dysuria, flank pain, frequency, hematuria and urgency. Musculoskeletal: Negative. Negative for back pain, falls, joint pain, myalgias and neck pain. Skin: Negative for itching and rash. Neurological: Negative. Negative for dizziness, tingling, tremors, sensory change, speech change, focal weakness, seizures, loss of consciousness, weakness and headaches. Endo/Heme/Allergies: Negative for environmental allergies and polydipsia. Does not bruise/bleed easily.    Psychiatric/Behavioral: Negative for depression, hallucinations, memory loss, substance abuse and suicidal ideas. The patient is not nervous/anxious and does not have insomnia. All other systems reviewed and are negative. Physical Exam   Constitutional: He is oriented to person, place, and time. He appears well-developed and well-nourished. No distress. HENT:   Head: Normocephalic and atraumatic. Right Ear: External ear normal.   Left Ear: External ear normal.   Nose: Nose normal.   Mouth/Throat: Oropharynx is clear and moist. No oropharyngeal exudate. Eyes: Conjunctivae and EOM are normal. Pupils are equal, round, and reactive to light. Right eye exhibits no discharge. Left eye exhibits no discharge. No scleral icterus. Neck: Normal range of motion. Neck supple. No JVD present. Carotid bruit is not present. No tracheal deviation present. No thyromegaly present. Cardiovascular: Normal rate, regular rhythm, normal heart sounds and intact distal pulses. Exam reveals no gallop and no friction rub. No murmur heard. Pulmonary/Chest: Effort normal and breath sounds normal. No stridor. No respiratory distress. He has no wheezes. He has no rales. He exhibits no tenderness. Abdominal: Soft. Bowel sounds are normal. He exhibits no distension and no mass. There is no tenderness. There is no rebound and no guarding. Genitourinary: Rectal exam shows guaiac negative stool. Genitourinary Comments: Prostate is normal, symmetrical, smooth, not enlarged, no nodules   Musculoskeletal: Normal range of motion. He exhibits no edema or tenderness. Lymphadenopathy:     He has no cervical adenopathy. Neurological: He is alert and oriented to person, place, and time. He has normal reflexes. No cranial nerve deficit. He exhibits normal muscle tone. Coordination normal.   Skin: Skin is warm and dry. No rash noted. He is not diaphoretic. No erythema. No pallor. Psychiatric: He has a normal mood and affect.  His behavior is normal. Judgment and thought content normal.   Nursing note and vitals reviewed. ASSESSMENT and PLAN  Diagnoses and all orders for this visit:    1. Physical exam        -     PSA W/ REFLX FREE PSA  2. ADD (attention deficit disorder)  -     Dexmethylphenidate (FOCALIN XR) 30 mg BP50; Take 1 Cap by mouth daily. Max Daily Amount: 1 Cap  - I advised pt to try taking Focalin 30mg and stop the Vyvanse 60mg. Will follow up in 1 month. 3. Blood in stool  -     OCCULT BLOOD, IMMUNOASSAY (FIT); Future  - I advised pt to do a FIT test to rule out colon cancer. 4. Mixed hyperlipidemia  -     atorvastatin (LIPITOR) 10 mg tablet; Take 1 Tab by mouth daily.  - I advised pt to try and eat more vegetables. 5. Acquired hypothyroidism        - TSH levels were 3.070 on 09/06/17.         - Stable, continue current regimen    6. Overweight (BMI 25.0-29.9)        - I have reviewed/discussed the above normal BMI with the patient. I have recommended the following interventions: dietary management education, guidance, and counseling, encourage exercise and monitor weight . 7. Encounter for immunization  -     Influenza virus vaccine (QUADRIVALENT PRES FREE SYRINGE) IM ([id number])      Follow-up Disposition:  Return in about 1 month (around 10/13/2017) for ADD. Medication risks/benefits/costs/interactions/alternatives discussed with patient. Advised patient to call back or return to office if symptoms worsen/change/persist.  If patient cannot reach us or should anything more severe/urgent arise he/she should proceed directly to the nearest emergency department. Discussed expected course/resolution/complications of diagnosis in detail with patient. Patient given a written after visit summary which includes her diagnoses, current medications and vitals. Patient expressed understanding with the diagnosis and plan. Written by meek Valdes, as dictated by Nelida Marte M.D.   I have reviewed and agree with the above note and have made corrections where appropriate,  Alondra Ortiz MD\

## 2017-09-13 NOTE — MR AVS SNAPSHOT
Visit Information Date & Time Provider Department Dept. Phone Encounter #  
 9/13/2017  9:00 AM Kamari Cote MD [address] [place] [address] [phone] [id number] Follow-up Instructions Return in about 1 month (around 10/13/2017) for ADD. Upcoming Health Maintenance Date Due DTaP/Tdap/Td series (2 - Td) 1/4/2027 Allergies as of 9/13/2017  Review Complete On: 9/13/2017 By: Kamari Cote MD  
 No Known Allergies Current Immunizations  Reviewed on 1/4/2017 Name Date Influenza Vaccine (Quad) PF 9/13/2017, 1/4/2017 Td, Adsorbed PF 8/31/2016 Not reviewed this visit You Were Diagnosed With   
  
 Codes Comments Physical exam    -  Primary ICD-10-CM: Z00.00 ICD-9-CM: V70.9 ADD (attention deficit disorder)     ICD-10-CM: F98.8 ICD-9-CM: 314.00 Blood in stool     ICD-10-CM: K92.1 ICD-9-CM: 578.1 Mixed hyperlipidemia     ICD-10-CM: E78.2 ICD-9-CM: 272.2 Acquired hypothyroidism     ICD-10-CM: E03.9 ICD-9-CM: 244.9 Overweight (BMI 25.0-29. 9)     ICD-10-CM: J39.4 ICD-9-CM: 278.02 Encounter for immunization     ICD-10-CM: E64 ICD-9-CM: V03.89 Vitals BP Pulse Temp Resp Height(growth percentile) Weight(growth percentile) 114/87 (BP 1 Location: Left arm, BP Patient Position: Sitting) 72 97.7 °F (36.5 °C) (Oral) 16 6' 2\" (1.88 m) 202 lb (91.6 kg) SpO2 BMI Smoking Status 99% 25.94 kg/m2 Never Smoker Vitals History BMI and BSA Data Body Mass Index Body Surface Area  
 25.94 kg/m 2 2.19 m 2 Preferred Pharmacy Pharmacy Name Phone [id number] Hayley Prescott, [address] [facility] [phone] Your Updated Medication List  
  
   
This list is accurate as of: 9/13/17 10:01 AM.  Always use your most recent med list.  
  
  
  
  
 atorvastatin 10 mg tablet Commonly known as:  LIPITOR Take 1 Tab by mouth daily. Dexmethylphenidate 30 mg Bp50 Commonly known as:  FOCALIN XR Take 1 Cap by mouth daily. Max Daily Amount: 1 Cap UNITHROID 100 mcg tablet Generic drug:  levothyroxine TAKE 1 TABLET BY MOUTH DAILY  
  
 VITAMIN D3 2,000 unit Cap capsule Generic drug:  Cholecalciferol (Vitamin D3) Take  by mouth daily. Prescriptions Printed Refills Dexmethylphenidate (FOCALIN XR) 30 mg BP50 0 Sig: Take 1 Cap by mouth daily. Max Daily Amount: 1 Cap Class: Print Route: Oral  
  
Prescriptions Sent to Pharmacy Refills  
 atorvastatin (LIPITOR) 10 mg tablet 1 Sig: Take 1 Tab by mouth daily. Class: Normal  
 [facility], [address] [place] [address] Ph #: [phone] Route: Oral  
  
We Performed the Following INFLUENZA VIRUS VAC QUAD,SPLIT,PRESV FREE SYRINGE IM [id number] CPT(R)] PSA W/ REFLX FREE PSA [01325 CPT(R)] Follow-up Instructions Return in about 1 month (around 10/13/2017) for ADD. To-Do List   
 10/13/2017 Lab:  OCCULT BLOOD, IMMUNOASSAY (FIT) Patient Instructions Influenza (Flu) Vaccine (Inactivated or Recombinant): What You Need to Know Why get vaccinated? Influenza (\"flu\") is a contagious disease that spreads around the [place] every winter, usually between October and May. Flu is caused by influenza viruses and is spread mainly by coughing, sneezing, and close contact. Anyone can get flu. Flu strikes suddenly and can last several days. Symptoms vary by age, but can include: · Fever/chills. · Sore throat. · Muscle aches. · Fatigue. · Cough. · Headache. · Runny or stuffy nose. Flu can also lead to pneumonia and blood infections, and cause diarrhea and seizures in children. If you have a medical condition, such as heart or lung disease, flu can make it worse. Flu is more dangerous for some people.  Infants and young children, people 72years of age and older, pregnant women, and people with certain health conditions or a weakened immune system are at greatest risk. Each year thousands of people in the [facility] die from flu, and many more are hospitalized. Flu vaccine can: · Keep you from getting flu. · Make flu less severe if you do get it. · Keep you from spreading flu to your family and other people. Inactivated and recombinant flu vaccines A dose of flu vaccine is recommended every flu season. Children 6 months through 6years of age may need two doses during the same flu season. Everyone else needs only one dose each flu season. Some inactivated flu vaccines contain a very small amount of a mercury-based preservative called thimerosal. Studies have not shown thimerosal in vaccines to be harmful, but flu vaccines that do not contain thimerosal are available. There is no live flu virus in flu shots. They cannot cause the flu. There are many flu viruses, and they are always changing. Each year a new flu vaccine is made to protect against three or four viruses that are likely to cause disease in the upcoming flu season. But even when the vaccine doesn't exactly match these viruses, it may still provide some protection. Flu vaccine cannot prevent: · Flu that is caused by a virus not covered by the vaccine. · Illnesses that look like flu but are not. Some people should not get this vaccine Tell the person who is giving you the vaccine: · If you have any severe (life-threatening) allergies. If you ever had a life-threatening allergic reaction after a dose of flu vaccine, or have a severe allergy to any part of this vaccine, you may be advised not to get vaccinated. Most, but not all, types of flu vaccine contain a small amount of egg protein. · If you ever had Guillain-Barré syndrome (also called GBS) Some people with a history of GBS should not get this vaccine. This should be discussed with your doctor. · If you are not feeling well. It is usually okay to get flu vaccine when you have a mild illness, but you might be asked to come back when you feel better. Risks of a vaccine reaction With any medicine, including vaccines, there is a chance of reactions. These are usually mild and go away on their own, but serious reactions are also possible. Most people who get a flu shot do not have any problems with it. Minor problems following a flu shot include: · Soreness, redness, or swelling where the shot was given · Hoarseness · Sore, red or itchy eyes · Cough · Fever · Aches · Headache · Itching · Fatigue If these problems occur, they usually begin soon after the shot and last 1 or 2 days. More serious problems following a flu shot can include the following: · There may be a small increased risk of Guillain-Barré Syndrome (GBS) after inactivated flu vaccine. This risk has been estimated at 1 or 2 additional cases per million people vaccinated. This is much lower than the risk of severe complications from flu, which can be prevented by flu vaccine. · Cecilia Brown children who get the flu shot along with pneumococcal vaccine (PCV13) and/or DTaP vaccine at the same time might be slightly more likely to have a seizure caused by fever. Ask your doctor for more information. Tell your doctor if a child who is getting flu vaccine has ever had a seizure Problems that could happen after any injected vaccine: · People sometimes faint after a medical procedure, including vaccination. Sitting or lying down for about 15 minutes can help prevent fainting, and injuries caused by a fall. Tell your doctor if you feel dizzy, or have vision changes or ringing in the ears. · Some people get severe pain in the shoulder and have difficulty moving the arm where a shot was given. This happens very rarely. · Any medication can cause a severe allergic reaction.  Such reactions from a vaccine are very rare, estimated at about 1 in a million doses, and would happen within a few minutes to a few hours after the vaccination. As with any medicine, there is a very remote chance of a vaccine causing a serious injury or death. The safety of vaccines is always being monitored. For more information, visit: www.cdc.gov/vaccinesafety/. What if there is a serious reaction? What should I look for? · Look for anything that concerns you, such as signs of a severe allergic reaction, very high fever, or unusual behavior. Signs of a severe allergic reaction can include hives, swelling of the face and throat, difficulty breathing, a fast heartbeat, dizziness, and weakness  usually within a few minutes to a few hours after the vaccination. What should I do? · If you think it is a severe allergic reaction or other emergency that can't wait, call 9-1-1 and get the person to the nearest hospital. Otherwise, call your doctor. · Reactions should be reported to the \"Vaccine Adverse Event Reporting System\" (VAERS). Your doctor should file this report, or you can do it yourself through the VAERS website at www.vaers. [facility].gov, or by calling 4-601.374.3047. ffk environment does not give medical advice. The National Vaccine Injury Compensation Program 
The National Vaccine Injury Compensation Program (VICP) is a federal program that was created to compensate people who may have been injured by certain vaccines. Persons who believe they may have been injured by a vaccine can learn about the program and about filing a claim by calling 3-118.934.1552 or visiting the [employer] website at www.[facility].gov/vaccinecompensation. There is a time limit to file a claim for compensation. How can I learn more? · Ask your healthcare provider. He or she can give you the vaccine package insert or suggest other sources of information. · Call your local or state health department.  
· Contact the Centers for Disease Control and Prevention (CDC): 
 ¨ Call 8-112.830.2593 (1-800-CDC-INFO) or ¨ Visit CDC's website at www.cdc.gov/flu Vaccine Information Statement Inactivated Influenza Vaccine 8/7/2015) [address] NADER Mcmillan 217RI-77 [facility] and [employer] Centers for Disease Control and Prevention Many Vaccine Information Statements are available in Sami and other languages. See www.immunize.org/vis. Muchas hojas de información sobre vacunas están disponibles en español y en otros idiomas. Visite www.immunize.org/vis. Care instructions adapted under license by [employer] (which disclaims liability or warranty for this information). If you have questions about a medical condition or this instruction, always ask your healthcare professional. [address] any warranty or liability for your use of this information. Introducing [facility]! Dear Francis Guadarramaodore: Thank you for requesting a [employer] account. Our records indicate that you already have an active [employer] account. You can access your account anytime at [email or web address] Did you know that you can access your hospital and ER discharge instructions at any time in [employer]? You can also review all of your test results from your hospital stay or ER visit. Additional Information If you have questions, please visit the Frequently Asked Questions section of the [employer] website at [email or web address] Remember, [employer] is NOT to be used for urgent needs. For medical emergencies, dial 911. Now available from your iPhone and Android! Please provide this summary of care documentation to your next provider. Your primary care clinician is listed as Sarwat Morse. If you have any questions after today's visit, please call [phone].

## 2017-09-13 NOTE — PROGRESS NOTES
1. Have you been to the ER, urgent care clinic since your last visit? Hospitalized since your last visit? No    2. Have you seen or consulted any other health care providers outside of the [address] [place] [address] since your last visit? Include any pap smears or colon screening. No     Chief Complaint   Patient presents with    Complete Physical     Not fasting    Flu shot ordered per Dr. Anusha Parker.

## 2017-09-13 NOTE — PATIENT INSTRUCTIONS

## 2017-09-14 ENCOUNTER — TELEPHONE (OUTPATIENT)
Dept: FAMILY MEDICINE CLINIC | Age: 47
End: 2017-09-14

## 2017-09-15 LAB
PSA SERPL-MCNC: 2.4 NG/ML (ref 0–4)
REFLEX CRITERIA: NORMAL

## 2017-09-15 NOTE — PROGRESS NOTES
PSA is high normal  We need to recheck again in 4-6 months  Prostate may be inflamed but we need to recheck again

## 2017-09-19 DIAGNOSIS — K92.1 BLOOD IN STOOL: ICD-10-CM

## 2017-09-23 LAB — HEMOCCULT STL QL IA: NEGATIVE

## 2017-09-25 ENCOUNTER — TELEPHONE (OUTPATIENT)
Dept: FAMILY MEDICINE CLINIC | Age: 47
End: 2017-09-25

## 2017-09-25 NOTE — PROGRESS NOTES
[phone] (M) attempted to call patient no answer left message to call me back in regards to his lab result

## 2017-10-11 ENCOUNTER — OFFICE VISIT (OUTPATIENT)
Dept: FAMILY MEDICINE CLINIC | Age: 47
End: 2017-10-11

## 2017-10-11 VITALS
HEART RATE: 70 BPM | WEIGHT: 204.4 LBS | HEIGHT: 74 IN | TEMPERATURE: 98 F | BODY MASS INDEX: 26.23 KG/M2 | OXYGEN SATURATION: 98 % | RESPIRATION RATE: 16 BRPM | DIASTOLIC BLOOD PRESSURE: 67 MMHG | SYSTOLIC BLOOD PRESSURE: 107 MMHG

## 2017-10-11 DIAGNOSIS — F98.8 ATTENTION DEFICIT DISORDER, UNSPECIFIED HYPERACTIVITY PRESENCE: Primary | ICD-10-CM

## 2017-10-11 RX ORDER — DEXMETHYLPHENIDATE HYDROCHLORIDE 35 MG/1
1 CAPSULE, EXTENDED RELEASE ORAL DAILY
Qty: 30 CAP | Refills: 0 | Status: SHIPPED | OUTPATIENT
Start: 2017-10-11 | End: 2017-11-27 | Stop reason: ALTCHOICE

## 2017-10-11 NOTE — PROGRESS NOTES
HISTORY OF PRESENT ILLNESS  Pérez Barrios is a 52 y.o. male. Blood pressure 107/67, pulse 70, temperature 98 °F (36.7 °C), temperature source Oral, resp. rate 16, height 6' 2\" (1.88 m), weight 204 lb 6.4 oz (92.7 kg), SpO2 98 %. Body mass index is 26.24 kg/(m^2). Chief Complaint   Patient presents with    Attention Deficit Disorder     1 month f/u        HPI  Pérez Barrios 52 y.o. male  presents to the office today for ADD follow up. ADD: Pt reports that his focus is there with the Dexmethylphenidate 30mg daily, but feels that his motivation is low. Pt reports that with the highest dose of Vyvanse he felt paranoid, but denies those symptoms with this new medication. Pt states that he feels a little more mellow on the Dexmethylphenidate. I advised pt that we will try the higher dosage of Dexmethylphenidate, but if he feels too mellow then we will stay with the 30mg. Pt reports that he has been eating well and has not lost any weight. Current Outpatient Prescriptions   Medication Sig Dispense Refill    dexmethylphenidate (FOCALIN XR) 35 mg BP50 Take 1 Cap by mouth daily. ( generic) Max Daily Amount: 1 Cap 30 Cap 0    Cholecalciferol, Vitamin D3, (VITAMIN D3) 2,000 unit cap capsule Take  by mouth daily.  atorvastatin (LIPITOR) 10 mg tablet Take 1 Tab by mouth daily. 90 Tab 1    UNITHROID 100 mcg tablet TAKE 1 TABLET BY MOUTH DAILY  3     No Known Allergies  History reviewed. No pertinent past medical history. Past Surgical History:   Procedure Laterality Date    HX VASECTOMY       Family History   Problem Relation Age of Onset    High Cholesterol Father    [de-identified] Migraines Sister     Thyroid Disease Sister      Social History   Substance Use Topics    Smoking status: Never Smoker    Smokeless tobacco: Never Used    Alcohol use Yes        Review of Systems   Constitutional: Negative. Negative for malaise/fatigue. Eyes: Negative for blurred vision.    Respiratory: Negative for shortness of breath. Cardiovascular: Negative for chest pain and leg swelling. Musculoskeletal: Negative. Neurological: Negative. Negative for dizziness and headaches. All other systems reviewed and are negative. Physical Exam   Constitutional: He is oriented to person, place, and time. He appears well-developed and well-nourished. HENT:   Head: Normocephalic and atraumatic. Neck: Carotid bruit is not present. Cardiovascular: Normal rate, regular rhythm, normal heart sounds and intact distal pulses. Exam reveals no gallop and no friction rub. No murmur heard. Pulmonary/Chest: Effort normal and breath sounds normal. No respiratory distress. He has no wheezes. He has no rales. He exhibits no tenderness. Neurological: He is alert and oriented to person, place, and time. Psychiatric: He has a normal mood and affect. His behavior is normal. Judgment and thought content normal.   Nursing note and vitals reviewed. ASSESSMENT and PLAN  Diagnoses and all orders for this visit:    1. Attention deficit disorder, unspecified hyperactivity presence  -     dexmethylphenidate (FOCALIN XR) 35 mg BP50; Take 1 Cap by mouth daily. ( generic) Max Daily Amount: 1 Cap        -  I advised pt that we will try the higher dosage of Dexmethylphenidate, but if he feels too mellow then we will stay with the 30mg. Follow-up Disposition: Not on File   Medication risks/benefits/costs/interactions/alternatives discussed with patient. Advised patient to call back or return to office if symptoms worsen/change/persist.  If patient cannot reach us or should anything more severe/urgent arise he/she should proceed directly to the nearest emergency department. Discussed expected course/resolution/complications of diagnosis in detail with patient. Patient given a written after visit summary which includes her diagnoses, current medications and vitals. Patient expressed understanding with the diagnosis and plan.   Written by meek Adams, as dictated by Fatou Webb M.D.   I have reviewed and agree with the above note and have made corrections where appropriate, Dr. Oleg Garcia MD

## 2017-10-11 NOTE — PATIENT INSTRUCTIONS
Attention Deficit Hyperactivity Disorder (ADHD) in Adults: Care Instructions  Your Care Instructions  Attention deficit hyperactivity disorder, or ADHD, is a condition that makes it hard to pay attention. So you may have problems when you try to focus, get organized, and finish tasks. It might make you more active than other people. Or you might do things without thinking first.  ADHD is very common. It usually starts in early childhood. Many adults don't realize they have it until their children are diagnosed. Then they become aware of their own symptoms. Doctors don't know what causes ADHD. But it often runs in families. ADHD can be treated with medicines, behavior training, and counseling. Treatment can improve your life. Follow-up care is a key part of your treatment and safety. Be sure to make and go to all appointments, and call your doctor if you are having problems. It's also a good idea to know your test results and keep a list of the medicines you take. How can you care for yourself at home? · Learn all you can about ADHD. This will help you and your family understand it better. · Take your medicines exactly as prescribed. Call your doctor if you think you are having a problem with your medicine. You will get more details on the specific medicines your doctor prescribes. · If you miss a dose of your medicine, do not take an extra dose. · If your doctor suggests counseling, find a counselor you like and trust. Talk openly and honestly. Be willing to make some changes. · Find a support group for adults with ADHD. Talking to others with the same problems can help you feel better. It can also give you ideas about how to best cope with the condition. · Get rid of distractions at your work space. Keep your desk clean. Try not to face a window or busy hallway. · Use files, planners, and other tools to keep you organized. · Limit use of alcohol, and do not use illegal drugs.  People with ADHD tend to become addicted more easily than others. Tell your doctor if you need help to quit. Counseling, support groups, and sometimes medicines can help you stay free of alcohol or drugs. · Get at least 30 minutes of physical activity on most days of the week. Exercise has been shown to help people cope with ADHD. Walking is a good choice. You also may want to do other activities, such as running, swimming, cycling, or playing tennis or team sports. When should you call for help? Watch closely for changes in your health, and be sure to contact your doctor if:  · You feel sad a lot or cry all the time. · You have trouble sleeping, or you sleep too much. · You find it hard to concentrate, make decisions, or remember things. · You change how you normally eat. · You feel guilty for no reason. Where can you learn more? Go to http://ja-darwin.info/. Enter B196 in the search box to learn more about \"Attention Deficit Hyperactivity Disorder (ADHD) in Adults: Care Instructions. \"  Current as of: July 26, 2016  Content Version: 11.3  © 3281-4405 [employer]. Care instructions adapted under license by [facility] [employer] (which disclaims liability or warranty for this information). If you have questions about a medical condition or this instruction, always ask your healthcare professional. [address] any warranty or liability for your use of this information.

## 2017-10-11 NOTE — MR AVS SNAPSHOT
Visit Information Date & Time Provider Department Dept. Phone Encounter #  
 10/11/2017 10:15 AM Philip Villar MD [address] [place] [address] [phone] [id number] Follow-up Instructions Return in about 1 month (around 11/11/2017). Upcoming Health Maintenance Date Due DTaP/Tdap/Td series (2 - Td) 1/4/2027 Allergies as of 10/11/2017  Review Complete On: 10/11/2017 By: Philip Villar MD  
 No Known Allergies Current Immunizations  Reviewed on 10/10/2017 Name Date Influenza Vaccine (Quad) PF 9/13/2017, 1/4/2017 Td, Adsorbed PF 8/31/2016 Not reviewed this visit You Were Diagnosed With   
  
 Codes Comments Attention deficit disorder, unspecified hyperactivity presence    -  Primary ICD-10-CM: F98.8 ICD-9-CM: 314.00 Vitals BP Pulse Temp Resp Height(growth percentile) Weight(growth percentile) 107/67 (BP 1 Location: Left arm, BP Patient Position: Sitting) 70 98 °F (36.7 °C) (Oral) 16 6' 2\" (1.88 m) 204 lb 6.4 oz (92.7 kg) SpO2 BMI Smoking Status 98% 26.24 kg/m2 Never Smoker BMI and BSA Data Body Mass Index Body Surface Area  
 26.24 kg/m 2 2.2 m 2 Preferred Pharmacy Pharmacy Name Phone [id number], [address] [place] [address] [phone] Your Updated Medication List  
  
   
This list is accurate as of: 10/11/17 11:09 AM.  Always use your most recent med list.  
  
  
  
  
 atorvastatin 10 mg tablet Commonly known as:  LIPITOR Take 1 Tab by mouth daily. dexmethylphenidate 35 mg Bp50 Commonly known as:  FOCALIN XR Take 1 Cap by mouth daily. ( generic) Max Daily Amount: 1 Cap UNITHROID 100 mcg tablet Generic drug:  levothyroxine TAKE 1 TABLET BY MOUTH DAILY  
  
 VITAMIN D3 2,000 unit Cap capsule Generic drug:  Cholecalciferol (Vitamin D3) Take  by mouth daily. Prescriptions Printed Refills dexmethylphenidate (FOCALIN XR) 35 mg BP50 0 Sig: Take 1 Cap by mouth daily. ( generic) Max Daily Amount: 1 Cap Class: Print Route: Oral  
  
Follow-up Instructions Return in about 1 month (around 11/11/2017). Patient Instructions Attention Deficit Hyperactivity Disorder (ADHD) in Adults: Care Instructions Your Care Instructions Attention deficit hyperactivity disorder, or ADHD, is a condition that makes it hard to pay attention. So you may have problems when you try to focus, get organized, and finish tasks. It might make you more active than other people. Or you might do things without thinking first. 
ADHD is very common. It usually starts in early childhood. Many adults don't realize they have it until their children are diagnosed. Then they become aware of their own symptoms. Doctors don't know what causes ADHD. But it often runs in families. ADHD can be treated with medicines, behavior training, and counseling. Treatment can improve your life. Follow-up care is a key part of your treatment and safety. Be sure to make and go to all appointments, and call your doctor if you are having problems. It's also a good idea to know your test results and keep a list of the medicines you take. How can you care for yourself at home? · Learn all you can about ADHD. This will help you and your family understand it better. · Take your medicines exactly as prescribed. Call your doctor if you think you are having a problem with your medicine. You will get more details on the specific medicines your doctor prescribes. · If you miss a dose of your medicine, do not take an extra dose. · If your doctor suggests counseling, find a counselor you like and trust. Talk openly and honestly. Be willing to make some changes. · Find a support group for adults with ADHD. Talking to others with the same problems can help you feel better.  It can also give you ideas about how to best cope with the condition. · Get rid of distractions at your work space. Keep your desk clean. Try not to face a window or busy hallway. · Use files, planners, and other tools to keep you organized. · Limit use of alcohol, and do not use illegal drugs. People with ADHD tend to become addicted more easily than others. Tell your doctor if you need help to quit. Counseling, support groups, and sometimes medicines can help you stay free of alcohol or drugs. · Get at least 30 minutes of physical activity on most days of the week. Exercise has been shown to help people cope with ADHD. Walking is a good choice. You also may want to do other activities, such as running, swimming, cycling, or playing tennis or team sports. When should you call for help? Watch closely for changes in your health, and be sure to contact your doctor if: 
· You feel sad a lot or cry all the time. · You have trouble sleeping, or you sleep too much. · You find it hard to concentrate, make decisions, or remember things. · You change how you normally eat. · You feel guilty for no reason. Where can you learn more? Go to http://ja-darwin.info/. Enter B196 in the search box to learn more about \"Attention Deficit Hyperactivity Disorder (ADHD) in Adults: Care Instructions. \" Current as of: July 26, 2016 Content Version: 11.3 © 6981-6448 [employer]. Care instructions adapted under license by [employer] (which disclaims liability or warranty for this information). If you have questions about a medical condition or this instruction, always ask your healthcare professional. [place] [address] any warranty or liability for your use of this information. Introducing \[facility]\"" & HEALTH SERVICES! Dear Monique Sayres: Thank you for requesting a [employer] account. Our records indicate that you already have an active [employer] account.   You can access your account anytime at [email or web address] Did you know that you can access your hospital and ER discharge instructions at any time in [employer]? You can also review all of your test results from your hospital stay or ER visit. Additional Information If you have questions, please visit the Frequently Asked Questions section of the [employer] website at [email or web address] Remember, [employer] is NOT to be used for urgent needs. For medical emergencies, dial 911. Now available from your iPhone and Android! Please provide this summary of care documentation to your next provider. Your primary care clinician is listed as Guilherme Lemus. If you have any questions after today's visit, please call [phone].

## 2017-10-11 NOTE — PROGRESS NOTES
Chief Complaint   Patient presents with    Attention Deficit Disorder     1 month f/u     1. Have you been to the ER, urgent care clinic since your last visit? Hospitalized since your last visit? No    2. Have you seen or consulted any other health care providers outside of the [address] [place] [address] since your last visit? Include any pap smears or colon screening.  No     Not fasting

## 2017-11-27 ENCOUNTER — OFFICE VISIT (OUTPATIENT)
Dept: FAMILY MEDICINE CLINIC | Age: 47
End: 2017-11-27

## 2017-11-27 VITALS
HEART RATE: 68 BPM | RESPIRATION RATE: 18 BRPM | TEMPERATURE: 98.1 F | SYSTOLIC BLOOD PRESSURE: 112 MMHG | BODY MASS INDEX: 26.56 KG/M2 | WEIGHT: 207 LBS | DIASTOLIC BLOOD PRESSURE: 68 MMHG | OXYGEN SATURATION: 99 % | HEIGHT: 74 IN

## 2017-11-27 DIAGNOSIS — F98.8 ATTENTION DEFICIT DISORDER, UNSPECIFIED HYPERACTIVITY PRESENCE: Primary | ICD-10-CM

## 2017-11-27 RX ORDER — LEVOTHYROXINE SODIUM 125 UG/1
TABLET ORAL
Refills: 1 | COMMUNITY
Start: 2017-10-12 | End: 2019-06-12 | Stop reason: DRUGHIGH

## 2017-11-27 NOTE — PROGRESS NOTES
HISTORY OF PRESENT ILLNESS  Collin Newman is a 52 y.o. male. Blood pressure 112/68, pulse 68, temperature 98.1 °F (36.7 °C), temperature source Oral, resp. rate 18, height 6' 2\" (1.88 m), weight 207 lb (93.9 kg), SpO2 99 %. Body mass index is 26.58 kg/(m^2). Chief Complaint   Patient presents with    Attention Deficit Disorder        HPI  Collin Newman 52 y.o. male  presents to the office today for ADD follow up. ADD: Pt continues with Focalin XR 35mg daily. Pt states that he does not like his current medication because it makes him feel lethargic. Pt has asked if he can take Vyvanse 30mg BID. He liked the Vyvanse previously, but felt like the 60mg was too much at once and wore off too quickly. Advised pt that we will start him on Vyvanse 30mg BID and if he has any issues with the pharmacy to let me know. Hyperlipidemia: Lipid panel on 09/06/17 notable for total cholesterol 200, , HDL 58, and triglycerides 113. Pt continues with Atorvastatin 10mg daily. Stable, continue current regimen. Vitamin D deficiency: Pt's vitamin D levels were 32.6 on 09/06/17. Pt continues with Vitamin D 2,000units daily. Stable, continue current regimen. Hypothyroidism: Pt's TSH levels were 3.070 on 09/06/17. Pt continues with Unithroid 100mcg. Pt reports that his endocrinologist has increased his dosage of Unithroid, but has not noticed any changes as of yet. Pt notes that his endocrinologist noticed tremors. Pt has a family history of Parkinson's, but does not know if it was ever confirmed. Advised pt that it may be his ADD medication, but he needs to keep an eye on it. Current Outpatient Prescriptions   Medication Sig Dispense Refill    UNITHROID 125 mcg tablet 1 TAB(S) ONCE A DAY ORALLY  1    lisdexamfetamine (VYVANSE) 30 mg capsule Take 1 Cap (30 mg total) by mouth two (2) times a day.   Max Daily Amount: 60 mg 60 Cap 0    Cholecalciferol, Vitamin D3, (VITAMIN D3) 2,000 unit cap capsule Take by mouth daily.  atorvastatin (LIPITOR) 10 mg tablet Take 1 Tab by mouth daily. 90 Tab 1    UNITHROID 100 mcg tablet TAKE 1 TABLET BY MOUTH DAILY  3     No Known Allergies  History reviewed. No pertinent past medical history. Past Surgical History:   Procedure Laterality Date    HX VASECTOMY       Family History   Problem Relation Age of Onset    High Cholesterol Father    [address] [facility] Migraines Sister     Thyroid Disease Sister      Social History   Substance Use Topics    Smoking status: Never Smoker    Smokeless tobacco: Never Used    Alcohol use Yes        Review of Systems   Constitutional: Negative. Negative for malaise/fatigue. Eyes: Negative for blurred vision. Respiratory: Negative for shortness of breath. Cardiovascular: Negative for chest pain and leg swelling. Musculoskeletal: Negative. Neurological: Negative. Negative for dizziness and headaches. All other systems reviewed and are negative. Physical Exam   Constitutional: He is oriented to person, place, and time. He appears well-developed and well-nourished. HENT:   Head: Normocephalic and atraumatic. Neck: Carotid bruit is not present. Cardiovascular: Normal rate, regular rhythm, normal heart sounds and intact distal pulses. Exam reveals no gallop and no friction rub. No murmur heard. Pulmonary/Chest: Effort normal and breath sounds normal. No respiratory distress. He has no wheezes. He has no rales. He exhibits no tenderness. Neurological: He is alert and oriented to person, place, and time. Psychiatric: He has a normal mood and affect. His behavior is normal. Judgment and thought content normal.   Nursing note and vitals reviewed. ASSESSMENT and PLAN  Diagnoses and all orders for this visit:    1. Attention deficit disorder, unspecified hyperactivity presence  -     lisdexamfetamine (VYVANSE) 30 mg capsule; Take 1 Cap (30 mg total) by mouth two (2) times a day.   Max Daily Amount: 60 mg  - Pt continues with Focalin XR 35mg daily. Pt states that he does not like his current medication because it makes him feel lethargic.   - Advised pt that we will start him on Vyvanse 30mg BID and if he has any issues with the pharmacy to let me know. Follow-up Disposition:  Return in about 1 month (around 12/27/2017) for ADD. Medication risks/benefits/costs/interactions/alternatives discussed with patient. Advised patient to call back or return to office if symptoms worsen/change/persist.  If patient cannot reach us or should anything more severe/urgent arise he/she should proceed directly to the nearest emergency department. Discussed expected course/resolution/complications of diagnosis in detail with patient. Patient given a written after visit summary which includes her diagnoses, current medications and vitals. Patient expressed understanding with the diagnosis and plan.   Written by meek Gonzalez, as dictated by Zoe Hoang M.D.

## 2017-11-27 NOTE — MR AVS SNAPSHOT
Visit Information Date & Time Provider Department Dept. Phone Encounter #  
 11/27/2017 10:30 AM Andrew Le MD [facility] [address] [phone] [id number] Follow-up Instructions Return in about 1 month (around 12/27/2017) for ADD. Upcoming Health Maintenance Date Due DTaP/Tdap/Td series (2 - Td) 1/4/2027 Allergies as of 11/27/2017  Review Complete On: 11/27/2017 By: Andrew Le MD  
 No Known Allergies Current Immunizations  Reviewed on 10/10/2017 Name Date Influenza Vaccine (Quad) PF 9/13/2017, 1/4/2017 Td, Adsorbed PF 8/31/2016 Not reviewed this visit You Were Diagnosed With   
  
 Codes Comments Attention deficit disorder, unspecified hyperactivity presence    -  Primary ICD-10-CM: F98.8 ICD-9-CM: 314.00 Vitals BP Pulse Temp Resp Height(growth percentile) Weight(growth percentile) 112/68 (BP 1 Location: Left arm, BP Patient Position: Sitting) 68 98.1 °F (36.7 °C) (Oral) 18 6' 2\" (1.88 m) 207 lb (93.9 kg) SpO2 BMI Smoking Status 99% 26.58 kg/m2 Never Smoker Vitals History BMI and BSA Data Body Mass Index Body Surface Area  
 26.58 kg/m 2 2.21 m 2 Preferred Pharmacy Pharmacy Name Phone [id number], [address] [place] [address] [phone] Your Updated Medication List  
  
   
This list is accurate as of: 11/27/17 11:27 AM.  Always use your most recent med list.  
  
  
  
  
 atorvastatin 10 mg tablet Commonly known as:  LIPITOR Take 1 Tab by mouth daily. lisdexamfetamine 30 mg capsule Commonly known as:  VYVANSE Take 1 Cap (30 mg total) by mouth two (2) times a day. Max Daily Amount: 60 mg  
  
 * UNITHROID 100 mcg tablet Generic drug:  levothyroxine TAKE 1 TABLET BY MOUTH DAILY  
  
 * UNITHROID 125 mcg tablet Generic drug:  levothyroxine 1 TAB(S) ONCE A DAY ORALLY VITAMIN D3 2,000 unit Cap capsule Generic drug:  Cholecalciferol (Vitamin D3) Take  by mouth daily. * Notice: This list has 2 medication(s) that are the same as other medications prescribed for you. Read the directions carefully, and ask your doctor or other care provider to review them with you. Prescriptions Printed Refills  
 lisdexamfetamine (VYVANSE) 30 mg capsule 0 Sig: Take 1 Cap (30 mg total) by mouth two (2) times a day. Max Daily Amount: 60 mg  
 Class: Print Route: Oral  
  
Follow-up Instructions Return in about 1 month (around 12/27/2017) for ADD. Patient Instructions Body Mass Index: Care Instructions Your Care Instructions Body mass index (BMI) can help you see if your weight is raising your risk for health problems. It uses a formula to compare how much you weigh with how tall you are. · A BMI lower than 18.5 is considered underweight. · A BMI between 18.5 and 24.9 is considered healthy. · A BMI between 25 and 29.9 is considered overweight. A BMI of 30 or higher is considered obese. If your BMI is in the normal range, it means that you have a lower risk for weight-related health problems. If your BMI is in the overweight or obese range, you may be at increased risk for weight-related health problems, such as high blood pressure, heart disease, stroke, arthritis or joint pain, and diabetes. If your BMI is in the underweight range, you may be at increased risk for health problems such as fatigue, lower protection (immunity) against illness, muscle loss, bone loss, hair loss, and hormone problems. BMI is just one measure of your risk for weight-related health problems. You may be at higher risk for health problems if you are not active, you eat an unhealthy diet, or you drink too much alcohol or use tobacco products. Follow-up care is a key part of your treatment and safety.  Be sure to make and go to all appointments, and call your doctor if you are having problems. It's also a good idea to know your test results and keep a list of the medicines you take. How can you care for yourself at home? · Practice healthy eating habits. This includes eating plenty of fruits, vegetables, whole grains, lean protein, and low-fat dairy. · If your doctor recommends it, get more exercise. Walking is a good choice. Bit by bit, increase the amount you walk every day. Try for at least 30 minutes on most days of the week. · Do not smoke. Smoking can increase your risk for health problems. If you need help quitting, talk to your doctor about stop-smoking programs and medicines. These can increase your chances of quitting for good. · Limit alcohol to 2 drinks a day for men and 1 drink a day for women. Too much alcohol can cause health problems. If you have a BMI higher than 25 · Your doctor may do other tests to check your risk for weight-related health problems. This may include measuring the distance around your waist. A waist measurement of more than 40 inches in men or 35 inches in women can increase the risk of weight-related health problems. · Talk with your doctor about steps you can take to stay healthy or improve your health. You may need to make lifestyle changes to lose weight and stay healthy, such as changing your diet and getting regular exercise. If you have a BMI lower than 18.5 · Your doctor may do other tests to check your risk for health problems. · Talk with your doctor about steps you can take to stay healthy or improve your health. You may need to make lifestyle changes to gain or maintain weight and stay healthy, such as getting more healthy foods in your diet and doing exercises to build muscle. Where can you learn more? Go to http://ja-darwin.info/. Enter S176 in the search box to learn more about \"Body Mass Index: Care Instructions. \" 
 Current as of: October 13, 2016 Content Version: 11.4 © 5839-7204 Healthwise, [employer] [place]. Care instructions adapted under license by [employer] (which disclaims liability or warranty for this information). If you have questions about a medical condition or this instruction, always ask your healthcare professional. [address] any warranty or liability for your use of this information. Introducing [facility]! Dear Renaye Brittle: Thank you for requesting a [employer] account. Our records indicate that you already have an active [employer] account. You can access your account anytime at [email or web address] Did you know that you can access your hospital and ER discharge instructions at any time in [employer]? You can also review all of your test results from your hospital stay or ER visit. Additional Information If you have questions, please visit the Frequently Asked Questions section of the [employer] website at [email or web address] Remember, [employer] is NOT to be used for urgent needs. For medical emergencies, dial 911. Now available from your iPhone and Android! Please provide this summary of care documentation to your next provider. Your primary care clinician is listed as Jewel Kern. If you have any questions after today's visit, please call [phone].

## 2017-11-27 NOTE — PATIENT INSTRUCTIONS
Body Mass Index: Care Instructions  Your Care Instructions    Body mass index (BMI) can help you see if your weight is raising your risk for health problems. It uses a formula to compare how much you weigh with how tall you are. · A BMI lower than 18.5 is considered underweight. · A BMI between 18.5 and 24.9 is considered healthy. · A BMI between 25 and 29.9 is considered overweight. A BMI of 30 or higher is considered obese. If your BMI is in the normal range, it means that you have a lower risk for weight-related health problems. If your BMI is in the overweight or obese range, you may be at increased risk for weight-related health problems, such as high blood pressure, heart disease, stroke, arthritis or joint pain, and diabetes. If your BMI is in the underweight range, you may be at increased risk for health problems such as fatigue, lower protection (immunity) against illness, muscle loss, bone loss, hair loss, and hormone problems. BMI is just one measure of your risk for weight-related health problems. You may be at higher risk for health problems if you are not active, you eat an unhealthy diet, or you drink too much alcohol or use tobacco products. Follow-up care is a key part of your treatment and safety. Be sure to make and go to all appointments, and call your doctor if you are having problems. It's also a good idea to know your test results and keep a list of the medicines you take. How can you care for yourself at home? · Practice healthy eating habits. This includes eating plenty of fruits, vegetables, whole grains, lean protein, and low-fat dairy. · If your doctor recommends it, get more exercise. Walking is a good choice. Bit by bit, increase the amount you walk every day. Try for at least 30 minutes on most days of the week. · Do not smoke. Smoking can increase your risk for health problems. If you need help quitting, talk to your doctor about stop-smoking programs and medicines. These can increase your chances of quitting for good. · Limit alcohol to 2 drinks a day for men and 1 drink a day for women. Too much alcohol can cause health problems. If you have a BMI higher than 25  · Your doctor may do other tests to check your risk for weight-related health problems. This may include measuring the distance around your waist. A waist measurement of more than 40 inches in men or 35 inches in women can increase the risk of weight-related health problems. · Talk with your doctor about steps you can take to stay healthy or improve your health. You may need to make lifestyle changes to lose weight and stay healthy, such as changing your diet and getting regular exercise. If you have a BMI lower than 18.5  · Your doctor may do other tests to check your risk for health problems. · Talk with your doctor about steps you can take to stay healthy or improve your health. You may need to make lifestyle changes to gain or maintain weight and stay healthy, such as getting more healthy foods in your diet and doing exercises to build muscle. Where can you learn more? Go to http://ja-darwin.info/. Enter S176 in the search box to learn more about \"Body Mass Index: Care Instructions. \"  Current as of: October 13, 2016  Content Version: 11.4  © 7880-4774 Healthwise, Inc[employer]. Care instructions adapted under license by [employer] (which disclaims liability or warranty for this information). If you have questions about a medical condition or this instruction, always ask your healthcare professional. [address] any warranty or liability for your use of this information.

## 2017-11-27 NOTE — PROGRESS NOTES
Chief Complaint   Patient presents with    Attention Deficit Disorder       Reviewed Record in preparation for visit and have obtained necessary documentation. Identified pt with two pt identifiers (Name @ )    There are no preventive care reminders to display for this patient. 1. Have you been to the ER, urgent care clinic since your last visit? Hospitalized since your last visit? No    2. Have you seen or consulted any other health care providers outside of the [address] [place] [address] since your last visit? Include any pap smears or colon screening.  No

## 2017-12-21 ENCOUNTER — OFFICE VISIT (OUTPATIENT)
Dept: FAMILY MEDICINE CLINIC | Age: 47
End: 2017-12-21

## 2017-12-21 VITALS
BODY MASS INDEX: 26.31 KG/M2 | TEMPERATURE: 98.5 F | WEIGHT: 205 LBS | OXYGEN SATURATION: 97 % | RESPIRATION RATE: 18 BRPM | HEART RATE: 81 BPM | DIASTOLIC BLOOD PRESSURE: 74 MMHG | HEIGHT: 74 IN | SYSTOLIC BLOOD PRESSURE: 125 MMHG

## 2017-12-21 DIAGNOSIS — K22.2 SCHATZKI'S RING: ICD-10-CM

## 2017-12-21 DIAGNOSIS — F98.8 ATTENTION DEFICIT DISORDER, UNSPECIFIED HYPERACTIVITY PRESENCE: Primary | ICD-10-CM

## 2017-12-21 NOTE — PROGRESS NOTES
HISTORY OF PRESENT ILLNESS  Nura Santana is a 52 y.o. male. Blood pressure 125/74, pulse 81, temperature 98.5 °F (36.9 °C), temperature source Oral, resp. rate 18, height 6' 2\" (1.88 m), weight 205 lb (93 kg), SpO2 97 %. Body mass index is 26.32 kg/(m^2). Chief Complaint   Patient presents with    Attention Deficit Disorder     follow up        HPI  Nura Santana 52 y.o. male  presents to the office today for ADD follow up. ADD: Pt continues with Vyvanse 30mg BID. He states that he likes the BID 30mg and denies any side effects from the medication. He notes that he likes the flexibility of being able to decide when he needs the second tablet and has found that some days he does not need to take the second tablet. Advised pt that because he is happy with his current regiment, we can schedule his follow up for 3 months out. Health maintenance: Pt states that he is starting to get to the gym and plans to be on a regular schedule by the new year. Pt notes that he has been having esophageal stricture again. He previously saw Dr. Yaneth Gilliland (GI) to have it expanded. Advised pt that he needs to drink plenty of water when taking his pills and will refer him to Dr. Madelaine Ascencio (GI) to have his Schatzki's ring expanded again. Current Outpatient Prescriptions   Medication Sig Dispense Refill    [START ON 12/27/2017] lisdexamfetamine (VYVANSE) 30 mg capsule Take 1 Cap (30 mg total) by mouth two (2) times a dayEarliest Fill Date: 12/27/17. Max Daily Amount: 60 mg 60 Cap 0    [START ON 1/27/2018] lisdexamfetamine (VYVANSE) 30 mg capsule Take 1 Cap (30 mg total) by mouth two (2) times a dayEarliest Fill Date: 1/27/18. Max Daily Amount: 60 mg 60 Cap 0    [START ON 2/27/2018] lisdexamfetamine (VYVANSE) 30 mg capsule Take 1 Cap (30 mg total) by mouth two (2) times a dayEarliest Fill Date: 2/27/18.   Max Daily Amount: 60 mg 60 Cap 0    UNITHROID 125 mcg tablet 1 TAB(S) ONCE A DAY ORALLY  1    Cholecalciferol, Vitamin D3, (VITAMIN D3) 2,000 unit cap capsule Take  by mouth daily.  atorvastatin (LIPITOR) 10 mg tablet Take 1 Tab by mouth daily. 90 Tab 1     No Known Allergies  History reviewed. No pertinent past medical history. Past Surgical History:   Procedure Laterality Date    HX VASECTOMY       Family History   Problem Relation Age of Onset    High Cholesterol Father    [address] [facility] [address] Sister     Thyroid Disease Sister      Social History   Substance Use Topics    Smoking status: Never Smoker    Smokeless tobacco: Never Used    Alcohol use Yes        Review of Systems   Constitutional: Negative. Negative for malaise/fatigue. Eyes: Negative for blurred vision. Respiratory: Negative for shortness of breath. Cardiovascular: Negative for chest pain and leg swelling. Musculoskeletal: Negative. Neurological: Negative. Negative for dizziness and headaches. All other systems reviewed and are negative. Physical Exam   Constitutional: He is oriented to person, place, and time. He appears well-developed and well-nourished. HENT:   Head: Normocephalic and atraumatic. Neck: Carotid bruit is not present. Cardiovascular: Normal rate, regular rhythm, normal heart sounds and intact distal pulses. Exam reveals no gallop and no friction rub. No murmur heard. Pulmonary/Chest: Effort normal and breath sounds normal. No respiratory distress. He has no wheezes. He has no rales. He exhibits no tenderness. Neurological: He is alert and oriented to person, place, and time. Psychiatric: He has a normal mood and affect. His behavior is normal. Judgment and thought content normal.   Nursing note and vitals reviewed. ASSESSMENT and PLAN  Diagnoses and all orders for this visit:    1. Attention deficit disorder, unspecified hyperactivity presence  -     lisdexamfetamine (VYVANSE) 30 mg capsule; Take 1 Cap (30 mg total) by mouth two (2) times a dayEarliest Fill Date: 12/27/17.   Max Daily Amount: 60 mg  -     lisdexamfetamine (VYVANSE) 30 mg capsule; Take 1 Cap (30 mg total) by mouth two (2) times a dayEarliest Fill Date: 1/27/18. Max Daily Amount: 60 mg  -     lisdexamfetamine (VYVANSE) 30 mg capsule; Take 1 Cap (30 mg total) by mouth two (2) times a dayEarliest Fill Date: 2/27/18. Max Daily Amount: 60 mg  - Pt continues with Vyvanse 30mg BID. He states that he likes the BID 30mg and denies any side effects from the medication.   - Advised pt that because he is happy with his current regiment, we can schedule his follow up for 3 months out. 2. Schatzki's ring  -     [facility] (Dr. Ellie Patel)  - Advised pt that he needs to drink plenty of water when taking his pills and will refer him to Dr. Ellie Patel () to have his Schatzki's ring expanded again. Follow-up Disposition:  Return in about 3 months (around 3/21/2018) for ADD. Medication risks/benefits/costs/interactions/alternatives discussed with patient. Advised patient to call back or return to office if symptoms worsen/change/persist.  If patient cannot reach us or should anything more severe/urgent arise he/she should proceed directly to the nearest emergency department. Discussed expected course/resolution/complications of diagnosis in detail with patient. Patient given a written after visit summary which includes her diagnoses, current medications and vitals. Patient expressed understanding with the diagnosis and plan. Written by meek Winters, as dictated by Ed Pelayo M.D.   I have reviewed and agree with the above note and have made corrections where appropriate, Dr. Paulette Johnson MD

## 2017-12-21 NOTE — PROGRESS NOTES
Chief Complaint   Patient presents with    Attention Deficit Disorder     follow up       Reviewed Record in preparation for visit and have obtained necessary documentation. Identified pt with two pt identifiers (Name @ )    There are no preventive care reminders to display for this patient. 1. Have you been to the ER, urgent care clinic since your last visit? Hospitalized since your last visit? No    2. Have you seen or consulted any other health care providers outside of the [address] [place] [address] since your last visit? Include any pap smears or colon screening.  No

## 2017-12-21 NOTE — PATIENT INSTRUCTIONS

## 2017-12-21 NOTE — MR AVS SNAPSHOT
Visit Information Date & Time Provider Department Dept. Phone Encounter #  
 12/21/2017 10:30 AM Juan Gruber MD [address] [place] [address] [phone] [id number] Follow-up Instructions Return in about 3 months (around 3/21/2018) for ADD. Upcoming Health Maintenance Date Due DTaP/Tdap/Td series (2 - Td) 1/4/2027 Allergies as of 12/21/2017  Review Complete On: 12/21/2017 By: Juan Gruber MD  
 No Known Allergies Current Immunizations  Reviewed on 10/10/2017 Name Date Influenza Vaccine (Quad) PF 9/13/2017, 1/4/2017 Td, Adsorbed PF 8/31/2016 Not reviewed this visit You Were Diagnosed With   
  
 Codes Comments Attention deficit disorder, unspecified hyperactivity presence    -  Primary ICD-10-CM: F98.8 ICD-9-CM: 314.00 Schatzki's ring     ICD-10-CM: K22.2 ICD-9-CM: 750.3 Vitals BP Pulse Temp Resp Height(growth percentile) Weight(growth percentile) 125/74 (BP 1 Location: Right arm, BP Patient Position: Sitting) 81 98.5 °F (36.9 °C) (Oral) 18 6' 2\" (1.88 m) 205 lb (93 kg) SpO2 BMI Smoking Status 97% 26.32 kg/m2 Never Smoker Vitals History BMI and BSA Data Body Mass Index Body Surface Area  
 26.32 kg/m 2 2.2 m 2 Preferred Pharmacy Pharmacy Name Phone [id number] Justin Calderon, [address] [place] [phone] Your Updated Medication List  
  
   
This list is accurate as of: 12/21/17 11:22 AM.  Always use your most recent med list.  
  
  
  
  
 atorvastatin 10 mg tablet Commonly known as:  LIPITOR Take 1 Tab by mouth daily. * lisdexamfetamine 30 mg capsule Commonly known as:  VYVANSE Take 1 Cap (30 mg total) by mouth two (2) times a dayEarliest Fill Date: 12/27/17. Max Daily Amount: 60 mg  
Start taking on:  12/27/2017 * lisdexamfetamine 30 mg capsule Commonly known as:  VYVANSE  
 Take 1 Cap (30 mg total) by mouth two (2) times a dayEarliest Fill Date: 1/27/18. Max Daily Amount: 60 mg  
Start taking on:  1/27/2018 * lisdexamfetamine 30 mg capsule Commonly known as:  VYVANSE Take 1 Cap (30 mg total) by mouth two (2) times a dayEarliest Fill Date: 2/27/18. Max Daily Amount: 60 mg  
Start taking on:  2/27/2018 UNITHROID 125 mcg tablet Generic drug:  levothyroxine 1 TAB(S) ONCE A DAY ORALLY  
  
 VITAMIN D3 2,000 unit Cap capsule Generic drug:  Cholecalciferol (Vitamin D3) Take  by mouth daily. * Notice: This list has 3 medication(s) that are the same as other medications prescribed for you. Read the directions carefully, and ask your doctor or other care provider to review them with you. Prescriptions Printed Refills  
 lisdexamfetamine (VYVANSE) 30 mg capsule 0 Starting on: 12/27/2017 Sig: Take 1 Cap (30 mg total) by mouth two (2) times a dayEarliest Fill Date: 12/27/17. Max Daily Amount: 60 mg  
 Class: Print Route: Oral  
 lisdexamfetamine (VYVANSE) 30 mg capsule 0 Starting on: 1/27/2018 Sig: Take 1 Cap (30 mg total) by mouth two (2) times a dayEarliest Fill Date: 1/27/18. Max Daily Amount: 60 mg  
 Class: Print Route: Oral  
 lisdexamfetamine (VYVANSE) 30 mg capsule 0 Starting on: 2/27/2018 Sig: Take 1 Cap (30 mg total) by mouth two (2) times a dayEarliest Fill Date: 2/27/18. Max Daily Amount: 60 mg  
 Class: Print Route: Oral  
  
We Performed the Following REFERRAL TO GASTROENTEROLOGY [DZI21 Custom] Follow-up Instructions Return in about 3 months (around 3/21/2018) for ADD. Referral Information Referral ID Referred By Referred To  
  
 [id number] [facility] [address]   
   [address] [address] [facility], [facility] Visits Status Start Date End Date 1 New Request 12/21/17 12/21/18 If your referral has a status of pending review or denied, additional information will be sent to support the outcome of this decision. Patient Instructions Hypothyroidism: Care Instructions Your Care Instructions You have hypothyroidism, which means that your body is not making enough thyroid hormone. This hormone helps your body use energy. If your thyroid level is low, you may feel tired, be constipated, have an increase in your blood pressure, or have dry skin or memory problems. You may also get cold easily, even when it is warm. Women with low thyroid levels may have heavy menstrual periods. A blood test to find your thyroid-stimulating hormone (TSH) level is used to check for hypothyroidism. A high TSH level may mean that you have low thyroid. When your body is not making enough thyroid hormone, TSH levels rise in an effort to make the body produce more. The treatment for hypothyroidism is to take thyroid hormone pills. You should start to feel better in 1 to 2 weeks. But it can take several months to see changes in the TSH level. You will need regular visits with your doctor to make sure you have the right dose of medicine. Most people need treatment for the rest of their lives. You will need to see your doctor regularly to have blood tests and to make sure you are doing well. Follow-up care is a key part of your treatment and safety. Be sure to make and go to all appointments, and call your doctor if you are having problems. It's also a good idea to know your test results and keep a list of the medicines you take. How can you care for yourself at home? · Take your thyroid hormone medicine exactly as prescribed. Call your doctor if you think you are having a problem with your medicine. Most people do not have side effects if they take the right amount of medicine regularly. ¨ Take the medicine 30 minutes before breakfast, and do not take it with calcium, vitamins, or iron. ¨ Do not take extra doses of your thyroid medicine. It will not help you get better any faster, and it may cause side effects. ¨ If you forget to take a dose, do NOT take a double dose of medicine. Take your usual dose the next day. · Tell your doctor about all prescription, herbal, or over-the-counter products you take. · Take care of yourself. Eat a healthy diet, get enough sleep, and get regular exercise. When should you call for help? Call 911 anytime you think you may need emergency care. For example, call if: 
? · You passed out (lost consciousness). ? · You have severe trouble breathing. ? · You have a very slow heartbeat (less than 60 beats a minute). ? · You have a low body temperature (95°F or below). ?Call your doctor now or seek immediate medical care if: 
? · You feel tired, sluggish, or weak. ? · You have trouble remembering things or concentrating. ? · You do not begin to feel better 2 weeks after starting your medicine. ? Watch closely for changes in your health, and be sure to contact your doctor if you have any problems. Where can you learn more? Go to http://ja-darwin.info/. Enter F814 in the search box to learn more about \"Hypothyroidism: Care Instructions. \" Current as of: May 12, 2017 Content Version: 11.4 © 2575-9801 Google. Care instructions adapted under license by [employer] (which disclaims liability or warranty for this information). If you have questions about a medical condition or this instruction, always ask your healthcare professional. [address] any warranty or liability for your use of this information. Introducing [facility]! Dear Sudarshan Rivers: Thank you for requesting a [employer] account. Our records indicate that you already have an active [employer] account. You can access your account anytime at [email or web address] Did you know that you can access your hospital and ER discharge instructions at any time in [employer]? You can also review all of your test results from your hospital stay or ER visit. Additional Information If you have questions, please visit the Frequently Asked Questions section of the [employer] website at [email or web address] Remember, [employer] is NOT to be used for urgent needs. For medical emergencies, dial 911. Now available from your iPhone and Android! Please provide this summary of care documentation to your next provider. Your primary care clinician is listed as Christal Hernandez. If you have any questions after today's visit, please call [phone].

## 2018-03-14 DIAGNOSIS — E78.2 MIXED HYPERLIPIDEMIA: ICD-10-CM

## 2018-03-15 RX ORDER — ATORVASTATIN CALCIUM 10 MG/1
TABLET, FILM COATED ORAL
Qty: 90 TAB | Refills: 1 | Status: SHIPPED | OUTPATIENT
Start: 2018-03-15 | End: 2018-09-10 | Stop reason: SDUPTHER

## 2018-04-16 DIAGNOSIS — F98.8 ATTENTION DEFICIT DISORDER, UNSPECIFIED HYPERACTIVITY PRESENCE: ICD-10-CM

## 2018-04-16 NOTE — TELEPHONE ENCOUNTER
LOV 12/21/2017  Has appointment 5/16/2018  The Prescription Monitoring Program has been reviewed for recent activity regarding controlled substances for this patient.      Per  last refill 3/8/2018

## 2018-04-16 NOTE — TELEPHONE ENCOUNTER
From: Cristiano Freeman  To: Sanna Fowler MD  Sent: 4/16/2018 6:36 AM EDT  Subject: Medication Renewal Request    Original authorizing provider: Sanna Fowler MD    [address] Yady Chatman would like a refill of the following medications:  lisdexamfetamine (VYVANSE) 30 mg capsule Sanna Fowler MD]    Preferred pharmacy: [id number] - [id number] [facility]:  I've completed my 3 prescriptions for Vyvanse that was approved by Dr Claudio Pagan on 12/11/2018.  Since the March appointment was not set-up as requested, and the earliest it could be set-up was May, I need to obtain another series of prescriptions for Vyvanse through the May appointment

## 2018-05-16 ENCOUNTER — OFFICE VISIT (OUTPATIENT)
Dept: FAMILY MEDICINE CLINIC | Age: 48
End: 2018-05-16

## 2018-05-16 VITALS
BODY MASS INDEX: 24 KG/M2 | DIASTOLIC BLOOD PRESSURE: 74 MMHG | TEMPERATURE: 98.6 F | HEART RATE: 66 BPM | WEIGHT: 187 LBS | OXYGEN SATURATION: 100 % | HEIGHT: 74 IN | SYSTOLIC BLOOD PRESSURE: 107 MMHG | RESPIRATION RATE: 18 BRPM

## 2018-05-16 DIAGNOSIS — K21.9 GASTROESOPHAGEAL REFLUX DISEASE WITHOUT ESOPHAGITIS: ICD-10-CM

## 2018-05-16 DIAGNOSIS — F98.8 ATTENTION DEFICIT DISORDER, UNSPECIFIED HYPERACTIVITY PRESENCE: Primary | ICD-10-CM

## 2018-05-16 RX ORDER — OMEPRAZOLE 40 MG/1
CAPSULE, DELAYED RELEASE ORAL
Refills: 2 | COMMUNITY
Start: 2018-03-09 | End: 2018-08-31

## 2018-05-16 NOTE — MR AVS SNAPSHOT
[address] 
[phone] Patient: Emilia Casas MRN: [id number] Visit Information Date & Time Provider Department Dept. Phone Encounter #  
 5/16/2018  1:45 PM Niko Franz MD [address] [place] [address] [phone] [id number] Follow-up Instructions Return in about 3 months (around 8/16/2018) for ADD. Upcoming Health Maintenance Date Due Influenza Age 5 to Adult 8/1/2018 DTaP/Tdap/Td series (2 - Td) 1/4/2027 Allergies as of 5/16/2018  Review Complete On: 5/16/2018 By: Niko Franz MD  
 No Known Allergies Current Immunizations  Reviewed on 10/10/2017 Name Date Influenza Vaccine (Quad) PF 9/13/2017, 1/4/2017 Td, Adsorbed PF 8/31/2016 Not reviewed this visit You Were Diagnosed With   
  
 Codes Comments Attention deficit disorder, unspecified hyperactivity presence    -  Primary ICD-10-CM: F98.8 ICD-9-CM: 314.00 Gastroesophageal reflux disease without esophagitis     ICD-10-CM: K21.9 ICD-9-CM: 530.81 Vitals BP Pulse Temp Resp Height(growth percentile) Weight(growth percentile) 107/74 (BP 1 Location: Left arm, BP Patient Position: Sitting) 66 98.6 °F (37 °C) (Oral) 18 6' 2\" (1.88 m) 187 lb (84.8 kg) SpO2 BMI Smoking Status 100% 24.01 kg/m2 Never Smoker BMI and BSA Data Body Mass Index Body Surface Area 24.01 kg/m 2 2.1 m 2 Preferred Pharmacy Pharmacy Name Phone [id number] Ramirez Gibson, [address] [facility] [phone] Your Updated Medication List  
  
   
This list is accurate as of 5/16/18  2:09 PM.  Always use your most recent med list.  
  
  
  
  
 atorvastatin 10 mg tablet Commonly known as:  LIPITOR  
TAKE 1 TAB BY MOUTH DAILY. * lisdexamfetamine 30 mg capsule Commonly known as:  VYVANSE  
 Take 1 Cap (30 mg total) by mouth two (2) times a day. Max Daily Amount: 60 mg  
  
 * lisdexamfetamine 30 mg capsule Commonly known as:  VYVANSE Take 1 Cap (30 mg total) by mouth two (2) times a dayEarliest Fill Date: 6/16/18. Max Daily Amount: 60 mg  
Start taking on:  6/16/2018 * lisdexamfetamine 30 mg capsule Commonly known as:  VYVANSE Take 1 Cap (30 mg total) by mouth two (2) times a dayEarliest Fill Date: 7/16/18. Max Daily Amount: 60 mg  
Start taking on:  7/16/2018  
  
 omeprazole 40 mg capsule Commonly known as:  PRILOSEC  
1 (ONE) CAPSULE TAKE ONE CAPSULE BY MOUTH 1/2 HOUR BEFORE BREAKFAST UNITHROID 125 mcg tablet Generic drug:  levothyroxine 1 TAB(S) ONCE A DAY ORALLY  
  
 VITAMIN D3 2,000 unit Cap capsule Generic drug:  Cholecalciferol (Vitamin D3) Take  by mouth daily. * Notice: This list has 3 medication(s) that are the same as other medications prescribed for you. Read the directions carefully, and ask your doctor or other care provider to review them with you. Prescriptions Printed Refills  
 lisdexamfetamine (VYVANSE) 30 mg capsule 0 Starting on: 7/16/2018 Sig: Take 1 Cap (30 mg total) by mouth two (2) times a dayEarliest Fill Date: 7/16/18. Max Daily Amount: 60 mg  
 Class: Print Route: Oral  
 lisdexamfetamine (VYVANSE) 30 mg capsule 0 Starting on: 6/16/2018 Sig: Take 1 Cap (30 mg total) by mouth two (2) times a dayEarliest Fill Date: 6/16/18. Max Daily Amount: 60 mg  
 Class: Print Route: Oral  
 lisdexamfetamine (VYVANSE) 30 mg capsule 0 Sig: Take 1 Cap (30 mg total) by mouth two (2) times a day. Max Daily Amount: 60 mg  
 Class: Print Route: Oral  
  
Follow-up Instructions Return in about 3 months (around 8/16/2018) for ADD. Introducing [facility]! Dear Tariq Roger: Thank you for requesting a [employer] account.   Our records indicate that you already have an active [employer] account. You can access your account anytime at [email or web address] Did you know that you can access your hospital and ER discharge instructions at any time in [employer]? You can also review all of your test results from your hospital stay or ER visit. Additional Information If you have questions, please visit the Frequently Asked Questions section of the [employer] website at [email or web address] Remember, [employer] is NOT to be used for urgent needs. For medical emergencies, dial 911. Now available from your iPhone and Android! Please provide this summary of care documentation to your next provider. Your primary care clinician is listed as Lavon Gaona. If you have any questions after today's visit, please call [phone].

## 2018-05-16 NOTE — PROGRESS NOTES
Chief Complaint   Patient presents with    Medication Refill     Vyvanse     1. Have you been to the ER, urgent care clinic since your last visit? Hospitalized since your last visit? No    2. Have you seen or consulted any other health care providers outside of the [address] [place] [address] since your last visit? Include any pap smears or colon screening.  Yes Reason for visit: Dr Evan Orantes for allergy testing and is allergic to soy and Nuts, tree nuts  \i

## 2018-05-16 NOTE — PATIENT INSTRUCTIONS
Attention Deficit Hyperactivity Disorder (ADHD) in Adults: Care Instructions  Your Care Instructions    Attention deficit hyperactivity disorder, or ADHD, is a condition that makes it hard to pay attention. So you may have problems when you try to focus, get organized, and finish tasks. It might make you more active than other people. Or you might do things without thinking first.  ADHD is very common. It usually starts in early childhood. Many adults don't realize they have it until their children are diagnosed. Then they become aware of their own symptoms. Doctors don't know what causes ADHD. But it often runs in families. ADHD can be treated with medicines, behavior training, and counseling. Treatment can improve your life. Follow-up care is a key part of your treatment and safety. Be sure to make and go to all appointments, and call your doctor if you are having problems. It's also a good idea to know your test results and keep a list of the medicines you take. How can you care for yourself at home? · Learn all you can about ADHD. This will help you and your family understand it better. · Take your medicines exactly as prescribed. Call your doctor if you think you are having a problem with your medicine. You will get more details on the specific medicines your doctor prescribes. · If you miss a dose of your medicine, do not take an extra dose. · If your doctor suggests counseling, find a counselor you like and trust. Talk openly and honestly. Be willing to make some changes. · Find a support group for adults with ADHD. Talking to others with the same problems can help you feel better. It can also give you ideas about how to best cope with the condition. · Get rid of distractions at your work space. Keep your desk clean. Try not to face a window or busy hallway. · Use files, planners, and other tools to keep you organized. · Limit use of alcohol, and do not use illegal drugs.  People with ADHD tend to become addicted more easily than others. Tell your doctor if you need help to quit. Counseling, support groups, and sometimes medicines can help you stay free of alcohol or drugs. · Get at least 30 minutes of physical activity on most days of the week. Exercise has been shown to help people cope with ADHD. Walking is a good choice. You also may want to do other activities, such as running, swimming, cycling, or playing tennis or team sports. When should you call for help? Watch closely for changes in your health, and be sure to contact your doctor if:  ? · You feel sad a lot or cry all the time. ? · You have trouble sleeping, or you sleep too much. ? · You find it hard to concentrate, make decisions, or remember things. ? · You change how you normally eat. ? · You feel guilty for no reason. Where can you learn more? Go to http://ja-darwin.info/. Enter B196 in the search box to learn more about \"Attention Deficit Hyperactivity Disorder (ADHD) in Adults: Care Instructions. \"  Current as of: May 12, 2017  Content Version: 11.4  © 5306-5397 Healthwise, Incorporated. Care instructions adapted under license by [employer] (which disclaims liability or warranty for this information). If you have questions about a medical condition or this instruction, always ask your healthcare professional. [address] any warranty or liability for your use of this information.

## 2018-05-16 NOTE — PROGRESS NOTES
HISTORY OF PRESENT ILLNESS  Aundrea Cabezas is a 52 y.o. male. Blood pressure 107/74, pulse 66, temperature 98.6 °F (37 °C), temperature source Oral, resp. rate 18, height 6' 2\" (1.88 m), weight 187 lb (84.8 kg), SpO2 100 %. Body mass index is 24.01 kg/(m^2). Chief Complaint   Patient presents with    Medication Refill     Vyvanse        HPI  Aundrea Cabezas 52 y.o. male  presents to the office today for medication refill. ADD: Pt continues with Vyvanse 30mg BID. Pt states that he is doing well on his current regimen. Stable, continue current regimen. Health maintenance: Pt states that he saw Dr. Erlinda Ellison (GI) who did an endoscopy that confirmed EOE. He was referred to an allergist who ran allergy testing on pt that confirmed soy and nut allergies and lactose intolerance. Pt reports that he has been losing weight due to changes in his diet. Pt notes that he is following up with his endocrinologist regularly for his hypothyroidism. Pt is currently on Unithroid 125mcg daily. Current Outpatient Prescriptions   Medication Sig Dispense Refill    omeprazole (PRILOSEC) 40 mg capsule 1 (ONE) CAPSULE TAKE ONE CAPSULE BY MOUTH 1/2 HOUR BEFORE BREAKFAST  2    [START ON 7/16/2018] lisdexamfetamine (VYVANSE) 30 mg capsule Take 1 Cap (30 mg total) by mouth two (2) times a dayEarliest Fill Date: 7/16/18. Max Daily Amount: 60 mg 60 Cap 0    [START ON 6/16/2018] lisdexamfetamine (VYVANSE) 30 mg capsule Take 1 Cap (30 mg total) by mouth two (2) times a dayEarliest Fill Date: 6/16/18. Max Daily Amount: 60 mg 60 Cap 0    lisdexamfetamine (VYVANSE) 30 mg capsule Take 1 Cap (30 mg total) by mouth two (2) times a day. Max Daily Amount: 60 mg 60 Cap 0    atorvastatin (LIPITOR) 10 mg tablet TAKE 1 TAB BY MOUTH DAILY. 90 Tab 1    UNITHROID 125 mcg tablet 1 TAB(S) ONCE A DAY ORALLY  1    Cholecalciferol, Vitamin D3, (VITAMIN D3) 2,000 unit cap capsule Take  by mouth daily.        No Known Allergies  History reviewed. No pertinent past medical history. Past Surgical History:   Procedure Laterality Date    HX VASECTOMY       Family History   Problem Relation Age of Onset    High Cholesterol Father    [facility] Sister     Thyroid Disease Sister      Social History   Substance Use Topics    Smoking status: Never Smoker    Smokeless tobacco: Never Used    Alcohol use Yes         Review of Systems   Constitutional: Negative. Negative for malaise/fatigue. Eyes: Negative for blurred vision. Respiratory: Negative for shortness of breath. Cardiovascular: Negative for chest pain and leg swelling. Musculoskeletal: Negative. Neurological: Negative. Negative for dizziness and headaches. All other systems reviewed and are negative. Physical Exam   Constitutional: He is oriented to person, place, and time. He appears well-developed and well-nourished. HENT:   Head: Normocephalic and atraumatic. Neck: Carotid bruit is not present. Cardiovascular: Normal rate, regular rhythm, normal heart sounds and intact distal pulses. Exam reveals no gallop and no friction rub. No murmur heard. Pulmonary/Chest: Effort normal and breath sounds normal. No respiratory distress. He has no wheezes. He has no rales. He exhibits no tenderness. Neurological: He is alert and oriented to person, place, and time. Psychiatric: He has a normal mood and affect. His behavior is normal. Judgment and thought content normal.   Nursing note and vitals reviewed. ASSESSMENT and PLAN  Diagnoses and all orders for this visit:    1. Attention deficit disorder, unspecified hyperactivity presence  -     lisdexamfetamine (VYVANSE) 30 mg capsule; Take 1 Cap (30 mg total) by mouth two (2) times a dayEarliest Fill Date: 7/16/18. Max Daily Amount: 60 mg  -     lisdexamfetamine (VYVANSE) 30 mg capsule; Take 1 Cap (30 mg total) by mouth two (2) times a dayEarliest Fill Date: 6/16/18.   Max Daily Amount: 60 mg  -     lisdexamfetamine (VYVANSE) 30 mg capsule; Take 1 Cap (30 mg total) by mouth two (2) times a day. Max Daily Amount: 60 mg  - Stable, continue current regimen. 2. Gastroesophageal reflux disease without esophagitis        - Stable, continue current regimen. Follow-up Disposition:  Return in about 3 months (around 8/16/2018) for ADD. Medication risks/benefits/costs/interactions/alternatives discussed with patient. Advised patient to call back or return to office if symptoms worsen/change/persist.  If patient cannot reach us or should anything more severe/urgent arise he/she should proceed directly to the nearest emergency department. Discussed expected course/resolution/complications of diagnosis in detail with patient. Patient given a written after visit summary which includes her diagnoses, current medications and vitals. Patient expressed understanding with the diagnosis and plan. Written by meek Zaldivar, as dictated by Michael Childers M.D.   I have reviewed and agree with the above note and have made corrections where appropriate, Dr. Asia Tam MD

## 2018-08-31 ENCOUNTER — OFFICE VISIT (OUTPATIENT)
Dept: FAMILY MEDICINE CLINIC | Age: 48
End: 2018-08-31

## 2018-08-31 VITALS
HEART RATE: 75 BPM | TEMPERATURE: 98.1 F | HEIGHT: 74 IN | DIASTOLIC BLOOD PRESSURE: 75 MMHG | OXYGEN SATURATION: 100 % | SYSTOLIC BLOOD PRESSURE: 102 MMHG | RESPIRATION RATE: 16 BRPM | BODY MASS INDEX: 23.69 KG/M2 | WEIGHT: 184.6 LBS

## 2018-08-31 DIAGNOSIS — F98.8 ATTENTION DEFICIT DISORDER, UNSPECIFIED HYPERACTIVITY PRESENCE: ICD-10-CM

## 2018-08-31 DIAGNOSIS — Z51.81 MEDICATION MONITORING ENCOUNTER: Primary | ICD-10-CM

## 2018-08-31 RX ORDER — OMEPRAZOLE 20 MG/1
10 CAPSULE, DELAYED RELEASE ORAL
COMMUNITY
Start: 2018-07-24 | End: 2019-06-12

## 2018-08-31 NOTE — MR AVS SNAPSHOT
Glenn Johnson 
 
 
 [address] [place] [address] 
[phone] Patient: Collin Newman MRN: [id number] BBB:[id number] Visit Information Date & Time Provider Department Dept. Phone Encounter #  
 8/31/2018  9:00 AM Jill Beard NP [facility] [phone] [id number] Follow-up Instructions Return in about 3 months (around 11/30/2018) for Follow Up. Your Appointments 9/19/2018  5:15 PM  
Complete Physical with Johnny Sosa MD  
[facility]) Appt Note: CPE ok per Dr Fanta Baca [phone]  
[phone]  
  
   
 [address] [address] Upcoming Health Maintenance Date Due Influenza Age 5 to Adult 9/30/2018* DTaP/Tdap/Td series (2 - Td) 1/4/2027 *Topic was postponed. The date shown is not the original due date. Allergies as of 8/31/2018  Review Complete On: 8/31/2018 By: Jill Beard NP No Known Allergies Current Immunizations  Reviewed on 10/10/2017 Name Date Influenza Vaccine (Quad) PF 9/13/2017, 1/4/2017 Td, Adsorbed PF 8/31/2016 Not reviewed this visit You Were Diagnosed With   
  
 Codes Comments Medication monitoring encounter    -  Primary ICD-10-CM: Z51.81 
ICD-9-CM: V58.83 Attention deficit disorder, unspecified hyperactivity presence     ICD-10-CM: F98.8 ICD-9-CM: 314.00 Vitals BP Pulse Temp Resp Height(growth percentile) Weight(growth percentile) 102/75 (BP 1 Location: Left arm, BP Patient Position: Sitting) 75 98.1 °F (36.7 °C) (Oral) 16 6' 2\" (1.88 m) 184 lb 9.6 oz (83.7 kg) SpO2 BMI Smoking Status 100% 23.7 kg/m2 Never Smoker Vitals History BMI and BSA Data Body Mass Index Body Surface Area  
 23.7 kg/m 2 2.09 m 2 Preferred Pharmacy Pharmacy Name Phone [facility][id number] [address], [address] [phone] Your Updated Medication List  
  
   
This list is accurate as of 8/31/18  9:49 AM.  Always use your most recent med list.  
  
  
  
  
 atorvastatin 10 mg tablet Commonly known as:  LIPITOR  
TAKE 1 TAB BY MOUTH DAILY. * lisdexamfetamine 30 mg capsule Commonly known as:  VYVANSE Take 1 Cap (30 mg total) by mouth two (2) times a day. Max Daily Amount: 60 mg  
  
 * lisdexamfetamine 30 mg capsule Commonly known as:  VYVANSE Take 1 Cap (30 mg total) by mouth two (2) times a dayEarliest Fill Date: 9/30/18. Max Daily Amount: 60 mg  
Start taking on:  9/30/2018 * lisdexamfetamine 30 mg capsule Commonly known as:  VYVANSE Take 1 Cap (30 mg total) by mouth two (2) times a dayEarliest Fill Date: 10/30/18. Max Daily Amount: 60 mg  
Start taking on:  10/30/2018  
  
 omeprazole 20 mg capsule Commonly known as:  PRILOSEC  
  
 UNITHROID 125 mcg tablet Generic drug:  levothyroxine 1 TAB(S) ONCE A DAY ORALLY  
  
 VITAMIN D3 2,000 unit Cap capsule Generic drug:  Cholecalciferol (Vitamin D3) Take  by mouth daily. * Notice: This list has 3 medication(s) that are the same as other medications prescribed for you. Read the directions carefully, and ask your doctor or other care provider to review them with you. Prescriptions Printed Refills  
 lisdexamfetamine (VYVANSE) 30 mg capsule 0 Sig: Take 1 Cap (30 mg total) by mouth two (2) times a day. Max Daily Amount: 60 mg  
 Class: Print Route: Oral  
 lisdexamfetamine (VYVANSE) 30 mg capsule 0 Starting on: 9/30/2018 Sig: Take 1 Cap (30 mg total) by mouth two (2) times a dayEarliest Fill Date: 9/30/18. Max Daily Amount: 60 mg  
 Class: Print Route: Oral  
 lisdexamfetamine (VYVANSE) 30 mg capsule 0 Starting on: 10/30/2018  Sig: Take 1 Cap (30 mg total) by mouth two (2) times a dayEarliest Fill Date: 10/30/18. Max Daily Amount: 60 mg  
 Class: Print Route: Oral  
  
We Performed the Following 10-PANEL URINE DRUG SCREEN [[id number] Custom] Follow-up Instructions Return in about 3 months (around 11/30/2018) for Follow Up. Introducing [facility]! Deagreg Lorenzo: Thank you for requesting a [employer] account. Our records indicate that you already have an active [employer] account. You can access your account anytime at [email or web address] Did you know that you can access your hospital and ER discharge instructions at any time in [employer]? You can also review all of your test results from your hospital stay or ER visit. Additional Information If you have questions, please visit the Frequently Asked Questions section of the [employer] website at [email or web address] Remember, [employer] is NOT to be used for urgent needs. For medical emergencies, dial 911. Now available from your iPhone and Android! Please provide this summary of care documentation to your next provider. Your primary care clinician is listed as Christal Hernandez. If you have any questions after today's visit, please call [phone].

## 2018-08-31 NOTE — PROGRESS NOTES
Chief Complaint   Patient presents with    Medication Refill     vyvanse     1. Have you been to the ER, urgent care clinic since your last visit? Hospitalized since your last visit? No    2. Have you seen or consulted any other health care providers outside of the [address] [place] [address] since your last visit? Include any pap smears or colon screening.  No

## 2018-08-31 NOTE — LETTER
Name:Magen Smith LBA:[birth year] MR #:[id number] [address] Page 1 of 5 CONTROLLED SUBSTANCE AGREEMENT I may be prescribed medications that are controlled substances as part  of my treatment plan for management of my medical condition(s). The goal of my treatment plan is to maintain and/or improve my health and wellbeing. Because controlled substances have an increased risk of abuse or harm, continual re-evaluation is needed determine if the goals of my treatment plan are being met for my safety and the safety of others. Morales Troncoso  am entering into this Controlled Substance Agreement with my provider, __________________________________ at [facility] . I understand that successful treatment requires mutual trust and honesty between me and my provider. I understand that there are state and federal laws and regulations which apply to the medications that my provider may prescribe that must be followed. I understand there are risks and benefits ts of taking the medicines that my provider may prescribe. I understand and agree that following this Agreement is necessary in continuing my provider-patient relationship and success of my treatment plan. As a part of my treatment plan, I agree to the following: COMMUNICATION: 
 
1. I will communicate fully with my provider about my medical condition(s), including the effect on my daily life and how well my medications are helping. I will tell my provider all of the medications that I take for any reason, including medications I receive from another health care provider, and will notify my provider about all issues, problems or concerns, including any side effects, which may be related to my medications. I understand that this information allows my provider to adjust my treatment plan to help manage my medical condition.  I understand that this information will become part of my permanent medical record. 2. I will notify my provider if I have a history of alcohol/drug misuse/addiction or if I have had treatment for alcohol/drug addiction in the past, or if I have a new problem with or concern about alcohol/drug use/addiction, because this increases the likelihood of high risk behaviors and may lead to serious medical conditions. 3. Females Only: I will notify my provider if I am or become pregnant, or if I intend to become pregnant, or if I intend to breastfeed. I understand that communication of these issues with my provider is important, due to possible effects my medication could have on an unborn fetus or breastfeeding child. Initials_____ Name:.Damir Childers [facility] JBW:[birth year] MR #:[id number] [address] Page 2 of 5 MISUSE OF MEDICATIONS / DRUGS: 
 
1. I agree to take all controlled substances as prescribed, and will not misuse or abuse any controlled substances prescribed by my provider. For my safety, I will not increase the amount of medicine I take without first talking with and getting permission from my provider. 2. If I have a medical emergency, another health care provider may prescribe me medication. If I seek emergency treatment, I will notify my provider within seventy-two (72) hours. 3. I understand that my provider may discuss my use and/or possible misuse/abuse of controlled substances and alcohol, as appropriate, with any health care provider involved in my care, pharmacist or legal authority. ILLEGAL DRUGS: 
 
1. I will not use illegal drugs of any kind, including but not limited to marijuana, heroin, cocaine, or any prescription drug which is not prescribed to me. DRUG DIVERSION / PRESCRIPTION FRAUD: 
 
1. I will not share, sell, trade, give away, or otherwise misuse my prescriptions or medications. 2. I will not alter any prescriptions provided to me by my provider. SINGLE PROVIDER: 
 
1. I agree that all controlled substances that I take will be prescribed only by my provider (or his/her covering provider) under this Agreement. This agreement does not prevent me from seeking emergency medical treatment or receiving pain management related to a surgery. PROTECTING MEDICATIONS: 
 
1. I am responsible for keeping my prescriptions and medications in a safe and secure place including safeguarding them from loss or theft. I understand that lost, stolen or damaged/destroyed prescriptions or medications will not be replaced. Initials____ Name:.Damir Rios JFT:[birth year] MR #:[id number] [facility] Page 3 of 5 PRESCRIPTION RENEWALS/REFILLS: 
 
1. I will follow my controlled substance medication schedule as prescribed by my provider. 2. I understand and agree that I will make any requests for renewals or refills of my prescriptions only at the time of an office visit or during my providers regular office hours subject to the prescription refill requirements of the individual practice. 3. I understand that my provider may not call in prescriptions for controlled substances to my pharmacy. 4. I understand that my provider may adjust or discontinue these medications as deemed appropriate for my medical treatment plan. This Agreement does not guarantee the prescription of controlled medications. 5. I agree that if my medications are adjusted or discontinued, I will properly dispose of any remaining medications. I understand that I will be required to dispose of any remaining controlled medications prior to being provided with any prescriptions for other controlled medications. 6. I understand that the renewal of my prescription depends on my medical condition, my consistent participation, and my adherence with my treatment plan and this Agreement. 7. I understand that if I do not keep an appointment with my provider, I may not receive a renewal or refill for my controlled substance medication. PRESCRIPTION MONITORING / DRUG TESTIN. I understand that my provider may require me to provide urine, saliva or blood for testing at any time. I understand that this testing will be used to monitor for safety and adherence with my treatment plan and this Agreement. 2. I understand that my provider may ask me to provide an observed urine specimen, which means that a nurse or other health care provider may watch me provide urine, and I agree to cooperate if I am asked to provide an observed specimen. 3. I understand that if I do not provide urine, saliva or blood samples within two (2) hours of my providers request, or other timeframe decided by my provider, my treatment plan could be changed, or my prescriptions and medications may be changed or ended. 4. I understand that urine, saliva and blood test results will be a part of my permanent medical record. Initials_____ Name:.Damir MICHAEL Grzegorz Noble O:8670 MR #:[id number] [address] 17 Page 4 of 5 
 
5. I understand that my provider is required to obtain a copy of my State Prescription Monitoring Program () Report at any time in order to safely prescribe medications. 6. I will bring all of my prescribed controlled substance medications in their original bottles to all of my scheduled appointments. 7. I understand that my provider may ask me to come to the practice with all of my prescribed medications for a random pill count at any time. I agree to cooperate if I am asked to come in for a random pill count. I understand that if I do not arrive in the timeframe decided by my provider, my treatment plan could be changed, or my prescriptions and medications may be changed or ended.  
 
COOPERATION WITH INVESTIGATIONS: 
 
 1. I authorize my provider and my pharmacy to cooperate fully with any local, state, or federal law enforcement agency in the investigation of any possible misuse, sale, or other diversion of my controlled substance prescriptions or medications. RISKS: 
 
1. I understand that my level of consciousness may be affected from the use of controlled substances, and I understand that there are risks, benefits, effects and potential alternatives (including no treatment) to the medications that my provider has prescribed. 2. I understand that I may become drowsy, tired, dizzy, constipated, and sick to my stomach, or have changes in my mood or in my sleep while taking my medications. I have talked with my provider about these possible side effects, risks, benefits, and alternative treatments, and my provider has answered all of my questions. 3. I understand that I should not suddenly stop taking my medications without first speaking with my provider. I understand that if I suddenly stop taking my medications, I may experience nausea, vomiting, sweating,anxiety, sleeplessness, itching or other uncomfortable feelings. 4. I will not take my medications with alcohol of any kind, including beer, wine or liquor. I understand that drinking alcohol with my medications increases the chances of side effects, including breathing problems or even death. 5. I understand that if I have a history of alcoholism or other drug addiction I may be at increased risk of addiction to my medications. Signs of addiction might include craving, compulsive use, and continued use despite harm. Since addiction is a disease, I understand my provider may decide to change my medications and refer me to appropriate treatment services. I understand that this information would become part of my permanent medical record. Initials_____ Name:.Damir Howard ODS:[birth year] MR #:[id number] [address] 17  
 Page 5 of 5  
 
 
6. Females only: Children born to women who regularly take controlled substances are likely to have physical problems and suffer withdrawal symptoms at birth. If I am of child-bearing age, I understand that I should use safe and effective birth control while taking any controlled substances to avoid the impact of medications on an unborn fetus or  child. I agree to notify my provider immediately if I should become pregnant so that my treatment plan can be adjusted. 7. Males only: I understand that chronic use of controlled substances has been associated with low testosterone levels in males which may affect my mood, stamina, sexual desire, and general health. I understand that my provider may order the appropriate laboratory test to determine my testosterone level,and I agree to this testing. ADHERENCE: 
 
1. I understand that if I do not adhere to this Agreement in any way, my provider may change my prescriptions, stop prescribing controlled substances or end our provider-patient relationship. 2. If my provider decides to stop prescribing medication, or decides to end our provider-patient relationship,my provider may require that I taper my medications slowly. If necessary, my provider may also provide a prescription for other medications to treat my withdrawal symptoms. UNDERSTANDING THIS AGREEMENT: 
 
I understand that my provider may adjust or stop my prescriptions for controlled substances based on my medical condition and my treatment plan. I understand that this Agreement does not guarantee that I will be prescribed medications or controlled substances. I understand that controlled substances may be just one part 
of my treatment plan. My initial on each page and my signature below shows that I have read each page of this Agreement, I have had an opportunity to ask questions, and all of my questions have been answered to my satisfaction by my provider. By signing below, I agree to comply with this Agreement, and I understand that if I do not follow the Agreements listed above, my provider may stop 
 
_________________________________________  Date/Time 8/31/2018 9:25 AM   
             (Patient Signature) 
 
________________________________________    Date/Time 8/31/2018 9:25 AM 
 (Parent or Guardian Signature if <18 yrs) 
 
_________________________________________ Date/Time 8/31/2018 9:25 AM 
 (Provider Signature)

## 2018-08-31 NOTE — PROGRESS NOTES
Assessment/Plan:     Diagnoses and all orders for this visit:    1. Medication monitoring encounter  -     10-PANEL URINE DRUG SCREEN    2. Attention deficit disorder, unspecified hyperactivity presence  -     lisdexamfetamine (VYVANSE) 30 mg capsule; Take 1 Cap (30 mg total) by mouth two (2) times a day. Max Daily Amount: 60 mg  -     lisdexamfetamine (VYVANSE) 30 mg capsule; Take 1 Cap (30 mg total) by mouth two (2) times a dayEarliest Fill Date: 9/30/18. Max Daily Amount: 60 mg  -     lisdexamfetamine (VYVANSE) 30 mg capsule; Take 1 Cap (30 mg total) by mouth two (2) times a dayEarliest Fill Date: 10/30/18. Max Daily Amount: 60 mg  -     10-PANEL URINE DRUG SCREEN  Length of supply given today: 3 months  Last Urine Drug Screen: Ordered today. Change to Dosage: No, continue current dose.  Reviewed: Yes and appropriate. Controlled Substance Contract completed today under his PCP. Patient states understanding that all other prescriptions will come from PCP after today. Follow-up Disposition:  Return in about 3 months (around 11/30/2018) for Follow Up. Discussed expected course/resolution/complications of diagnosis in detail with patient.    Medication risks/benefits/costs/interactions/alternatives discussed with patient.    Pt was given after visit summary which includes diagnoses, current medications & vitals. Pt expressed understanding with the diagnosis and plan          Subjective:      Aj Ospina is a 50 y.o. male who presents for had concerns including Medication Refill. ADHD   Aj Ospina is compliant with treatment regimen. Patient denies chest pain, shortness of breath, weight loss, insomnia, or difficulty with appetite. Symptoms are currently well controlled. Patient denies personal or family history of drug abuse. Previous neuropsychiatric evaluation is on file under [facility]/6/2017 [facility] . Controlled substance contract is not on file.   Reports mood is stable and is having no difficulty with work-related activities. Current Outpatient Prescriptions   Medication Sig Dispense Refill    omeprazole (PRILOSEC) 20 mg capsule       lisdexamfetamine (VYVANSE) 30 mg capsule Take 1 Cap (30 mg total) by mouth two (2) times a day. Max Daily Amount: 60 mg 60 Cap 0    [START ON 9/30/2018] lisdexamfetamine (VYVANSE) 30 mg capsule Take 1 Cap (30 mg total) by mouth two (2) times a dayEarliest Fill Date: 9/30/18. Max Daily Amount: 60 mg 60 Cap 0    [START ON 10/30/2018] lisdexamfetamine (VYVANSE) 30 mg capsule Take 1 Cap (30 mg total) by mouth two (2) times a dayEarliest Fill Date: 10/30/18. Max Daily Amount: 60 mg 60 Cap 0    atorvastatin (LIPITOR) 10 mg tablet TAKE 1 TAB BY MOUTH DAILY. 90 Tab 1    UNITHROID 125 mcg tablet 1 TAB(S) ONCE A DAY ORALLY  1    Cholecalciferol, Vitamin D3, (VITAMIN D3) 2,000 unit cap capsule Take  by mouth daily. No Known Allergies    ROS:   Review of Systems   Constitutional: Negative for malaise/fatigue. Eyes: Negative for blurred vision. Respiratory: Negative for shortness of breath. Cardiovascular: Negative for chest pain. Objective:     Visit Vitals    /75 (BP 1 Location: Left arm, BP Patient Position: Sitting)    Pulse 75    Temp 98.1 °F (36.7 °C) (Oral)    Resp 16    Ht 6' 2\" (1.88 m)    Wt 184 lb 9.6 oz (83.7 kg)    SpO2 100%    BMI 23.7 kg/m2       Vitals and Nurse Documentation reviewed. Physical Exam   Constitutional: No distress. Cardiovascular: S1 normal and S2 normal.  Exam reveals no gallop and no friction rub. No murmur heard. Pulmonary/Chest: Breath sounds normal. No respiratory distress. Skin: Skin is warm and dry.    Psychiatric: Mood and affect normal.

## 2018-09-03 LAB
AMPHET+METHAMPHET UR QL: POSITIVE
BARBITURATES UR QL SCN: NEGATIVE NG/ML
BENZODIAZ UR QL: NEGATIVE NG/ML
BZE UR QL: NEGATIVE NG/ML
CANNABINOIDS UR QL SCN: NEGATIVE NG/ML
MDMA UR QL SCN: NEGATIVE NG/ML
METHADONE UR QL SCN: NEGATIVE NG/ML
METHAQUALONE UR QL: NEGATIVE NG/ML
OPIATES UR QL: NEGATIVE NG/ML
PCP UR QL: NEGATIVE NG/ML
PROPOXYPH UR QL SCN: NEGATIVE NG/ML

## 2018-09-10 DIAGNOSIS — E78.2 MIXED HYPERLIPIDEMIA: ICD-10-CM

## 2018-09-11 RX ORDER — ATORVASTATIN CALCIUM 10 MG/1
TABLET, FILM COATED ORAL
Qty: 90 TAB | Refills: 1 | Status: SHIPPED | OUTPATIENT
Start: 2018-09-11 | End: 2019-03-10 | Stop reason: SDUPTHER

## 2018-09-19 ENCOUNTER — OFFICE VISIT (OUTPATIENT)
Dept: FAMILY MEDICINE CLINIC | Age: 48
End: 2018-09-19

## 2018-09-19 VITALS
HEIGHT: 74 IN | OXYGEN SATURATION: 100 % | TEMPERATURE: 96.9 F | BODY MASS INDEX: 23.74 KG/M2 | WEIGHT: 185 LBS | RESPIRATION RATE: 16 BRPM | SYSTOLIC BLOOD PRESSURE: 119 MMHG | HEART RATE: 72 BPM | DIASTOLIC BLOOD PRESSURE: 75 MMHG

## 2018-09-19 DIAGNOSIS — Z00.00 PHYSICAL EXAM: Primary | ICD-10-CM

## 2018-09-19 DIAGNOSIS — Z23 ENCOUNTER FOR IMMUNIZATION: ICD-10-CM

## 2018-09-19 NOTE — PATIENT INSTRUCTIONS

## 2018-09-19 NOTE — MR AVS SNAPSHOT
[facility] 
 
 
 [address] [facility] 1400 [address] [place] [address] 
[phone] Patient: Denia Edwards MRN: [id number]:[id number] Visit Information Date & Time Provider Department Dept. Phone Encounter #  
 9/19/2018  5:15 PM Claritza Posadas  [facility] [phone] [id number] Follow-up Instructions Return in about 3 months (around 12/19/2018). Upcoming Health Maintenance Date Due Influenza Age 5 to Adult 9/30/2018* DTaP/Tdap/Td series (2 - Td) 1/4/2027 *Topic was postponed. The date shown is not the original due date. Allergies as of 9/19/2018  Review Complete On: 9/19/2018 By: Claritza Posadas MD  
  
 Severity Noted Reaction Type Reactions Soy High 09/19/2018    Anaphylaxis Current Immunizations  Reviewed on 10/10/2017 Name Date Influenza Vaccine (Quad) PF 9/19/2018, 9/13/2017, 1/4/2017 Td, Adsorbed PF 8/31/2016 Not reviewed this visit You Were Diagnosed With   
  
 Codes Comments Physical exam    -  Primary ICD-10-CM: Z00.00 ICD-9-CM: V70.9 Encounter for immunization     ICD-10-CM: H84 ICD-9-CM: V03.89 Vitals BP Pulse Temp Resp Height(growth percentile) Weight(growth percentile) 119/75 (BP 1 Location: Left arm, BP Patient Position: Sitting) 72 96.9 °F (36.1 °C) (Oral) 16 6' 2\" (1.88 m) 185 lb (83.9 kg) SpO2 BMI Smoking Status 100% 23.75 kg/m2 Never Smoker Vitals History BMI and BSA Data Body Mass Index Body Surface Area  
 23.75 kg/m 2 2.09 m 2 Preferred Pharmacy Pharmacy Name Phone [id number] Jacquelin Tyson, [address] [facility] [phone] Your Updated Medication List  
  
   
This list is accurate as of 9/19/18  6:29 PM.  Always use your most recent med list.  
  
  
  
  
 atorvastatin 10 mg tablet Commonly known as:  LIPITOR  
TAKE 1 TAB BY MOUTH DAILY. lisdexamfetamine 30 mg capsule Commonly known as:  VYVANSE Take 1 Cap (30 mg total) by mouth two (2) times a day. Max Daily Amount: 60 mg  
  
 omeprazole 20 mg capsule Commonly known as:  PRILOSEC  
  
 UNITHROID 125 mcg tablet Generic drug:  levothyroxine 1 TAB(S) ONCE A DAY ORALLY  
  
 VITAMIN D3 2,000 unit Cap capsule Generic drug:  Cholecalciferol (Vitamin D3) Take  by mouth daily. We Performed the Following CBC WITH AUTOMATED DIFF [21350 CPT(R)] INFLUENZA VIRUS VAC QUAD,SPLIT,PRESV FREE SYRINGE IM [id number] CPT(R)] LIPID PANEL [05122 CPT(R)] METABOLIC PANEL, COMPREHENSIVE [67412 CPT(R)] SD IMMUNIZ ADMIN,1 SINGLE/COMB VAC/TOXOID [id number] CPT(R)] PSA W/ REFLX FREE PSA [80279 CPT(R)] T4, FREE [id number] CPT(R)] TSH 3RD GENERATION [85331 CPT(R)] URINALYSIS W/MICROSCOPIC [91372 CPT(R)] VITAMIN D, 25 HYDROXY [id number] CPT(R)] Follow-up Instructions Return in about 3 months (around 12/19/2018). Patient Instructions Well Visit, Ages 25 to 48: Care Instructions Your Care Instructions Physical exams can help you stay healthy. Your doctor has checked your overall health and may have suggested ways to take good care of yourself. He or she also may have recommended tests. At home, you can help prevent illness with healthy eating, regular exercise, and other steps. Follow-up care is a key part of your treatment and safety. Be sure to make and go to all appointments, and call your doctor if you are having problems. It's also a good idea to know your test results and keep a list of the medicines you take. How can you care for yourself at home? · Reach and stay at a healthy weight. This will lower your risk for many problems, such as obesity, diabetes, heart disease, and high blood pressure. · Get at least 30 minutes of physical activity on most days of the week. Walking is a good choice.  You also may want to do other activities, such as running, swimming, cycling, or playing tennis or team sports. Discuss any changes in your exercise program with your doctor. · Do not smoke or allow others to smoke around you. If you need help quitting, talk to your doctor about stop-smoking programs and medicines. These can increase your chances of quitting for good. · Talk to your doctor about whether you have any risk factors for sexually transmitted infections (STIs). Having one sex partner (who does not have STIs and does not have sex with anyone else) is a good way to avoid these infections. · Use birth control if you do not want to have children at this time. Talk with your doctor about the choices available and what might be best for you. · Protect your skin from too much sun. When you're outdoors from 10 a.m. to 4 p.m., stay in the shade or cover up with clothing and a hat with a wide brim. Wear sunglasses that block UV rays. Even when it's cloudy, put broad-spectrum sunscreen (SPF 30 or higher) on any exposed skin. · See a dentist one or two times a year for checkups and to have your teeth cleaned. · Wear a seat belt in the car. · Drink alcohol in moderation, if at all. That means no more than 2 drinks a day for men and 1 drink a day for women. Follow your doctor's advice about when to have certain tests. These tests can spot problems early. For everyone · Cholesterol. Have the fat (cholesterol) in your blood tested after age 21. Your doctor will tell you how often to have this done based on your age, family history, or other things that can increase your risk for heart disease. · Blood pressure. Have your blood pressure checked during a routine doctor visit. Your doctor will tell you how often to check your blood pressure based on your age, your blood pressure results, and other factors. · Vision. Talk with your doctor about how often to have a glaucoma test. 
· Diabetes. Ask your doctor whether you should have tests for diabetes. · Colon cancer. Have a test for colon cancer at age 48. You may have one of several tests. If you are younger than 48, you may need a test earlier if you have any risk factors. Risk factors include whether you already had a precancerous polyp removed from your colon or whether your parent, brother, sister, or child has had colon cancer. For women · Breast exam and mammogram. Talk to your doctor about when you should have a clinical breast exam and a mammogram. Medical experts differ on whether and how often women under 50 should have these tests. Your doctor can help you decide what is right for you. · Pap test and pelvic exam. Begin Pap tests at age 24. A Pap test is the best way to find cervical cancer. The test often is part of a pelvic exam. Ask how often to have this test. 
· Tests for sexually transmitted infections (STIs). Ask whether you should have tests for STIs. You may be at risk if you have sex with more than one person, especially if your partners do not wear condoms. For men · Tests for sexually transmitted infections (STIs). Ask whether you should have tests for STIs. You may be at risk if you have sex with more than one person, especially if you do not wear a condom. · Testicular cancer exam. Ask your doctor whether you should check your testicles regularly. · Prostate exam. Talk to your doctor about whether you should have a blood test (called a PSA test) for prostate cancer. Experts differ on whether and when men should have this test. Some experts suggest it if you are older than 39 and are -American or have a father or brother who got prostate cancer when he was younger than 72. When should you call for help? Watch closely for changes in your health, and be sure to contact your doctor if you have any problems or symptoms that concern you. Where can you learn more? Go to http://ja-darwin.info/. Enter P072 in the search box to learn more about \"Well Visit, Ages 25 to 48: Care Instructions. \" Current as of: May 16, 2017 Content Version: 11.7 © 3938-9630 [employer]. Care instructions adapted under license by "[employer] (which disclaims liability or warranty for this information). If you have questions about a medical condition or this instruction, always ask your healthcare professional. [place] [address] any warranty or liability for your use of this information. Introducing [facility]! Dear Drew Olmedo: Thank you for requesting a TeachScape account. Our records indicate that you already have an active TeachScape account. You can access your account anytime at [email or web address] Did you know that you can access your hospital and ER discharge instructions at any time in TeachScape? You can also review all of your test results from your hospital stay or ER visit. Additional Information If you have questions, please visit the Frequently Asked Questions section of the TeachScape website at [email or web address] Remember, TeachScape is NOT to be used for urgent needs. For medical emergencies, dial 911. Now available from your iPhone and Android! Please provide this summary of care documentation to your next provider. Your primary care clinician is listed as Jill Miller. If you have any questions after today's visit, please call [phone].

## 2018-09-19 NOTE — PROGRESS NOTES
Chief Complaint   Patient presents with    Complete Physical    Medication Evaluation     1. Have you been to the ER, urgent care clinic since your last visit? Hospitalized since your last visit? No    2. Have you seen or consulted any other health care providers outside of the [address] [place] [address] since your last visit? Include any pap smears or colon screening. No     Patient presents in office today for CPE. Patient concerned about possible medication side effect. Patient has urinary urgency.

## 2018-09-19 NOTE — PROGRESS NOTES
HISTORY OF PRESENT ILLNESS  Neha Campbell is a 50 y.o. male. Blood pressure 119/75, pulse 72, temperature 96.9 °F (36.1 °C), temperature source Oral, resp. rate 16, height 6' 2\" (1.88 m), weight 185 lb (83.9 kg), SpO2 100 %. Body mass index is 23.75 kg/(m^2). Chief Complaint   Patient presents with    Complete Physical    Medication Evaluation      HPI  Neha Campbell 50 y.o. male  presents to the office today for complete physical.     Hyperlipidemia: Lipid panel on 9/6/2017 notable for total cholesterol 200, , HDL 58, and triglycerides 113. Pt continues with Lipitor 10mg daily. Presumed stable, will assess levels tomorrow. Hypothyroidism: Pt's TSH level was 3.070 on 9/6/2017. Pt continues with Unithroid 125 mcg daily. Presumed stable, will assess levels tomorrow. Pt reports meeting with endocrinology every 6 months. Health Maintenance: Pt reports that he is having issues with urgency in urination. Pt reports that he gives his pet dog medicine to help with urinating. He notes that he grinds up her pills and can smell them and inquired if this could be the cause. Advised pt to make sure he washes hands to avoid potential ingestion. Pt reports that he met with Dr. Mahesh Fulton (GI) and that he had a biopsy of his upper GI tract. Reports that he did a scratch test with an allergist and notes eliminating soy which has helped with his weight loss and keeping his EOE stable. Pt received flu vaccine today in office. Pt denies family history of colon cancer. Advised pt that I would like to do a prostate exam because of his urinary issues. Pt was agreeable. Current Outpatient Prescriptions   Medication Sig Dispense Refill    atorvastatin (LIPITOR) 10 mg tablet TAKE 1 TAB BY MOUTH DAILY. 90 Tab 1    omeprazole (PRILOSEC) 20 mg capsule       lisdexamfetamine (VYVANSE) 30 mg capsule Take 1 Cap (30 mg total) by mouth two (2) times a day.   Max Daily Amount: 60 mg 60 Cap 0    UNITHROID 125 mcg tablet 1 TAB(S) ONCE A DAY ORALLY  1    Cholecalciferol, Vitamin D3, (VITAMIN D3) 2,000 unit cap capsule Take  by mouth daily. Allergies   Allergen Reactions    Soy Anaphylaxis     History reviewed. No pertinent past medical history. Past Surgical History:   Procedure Laterality Date    HX VASECTOMY       Family History   Problem Relation Age of Onset    High Cholesterol Father    [employer] Sister     Thyroid Disease Sister      Social History   Substance Use Topics    Smoking status: Never Smoker    Smokeless tobacco: Never Used    Alcohol use Yes        Review of Systems   Constitutional: Negative. Negative for chills, diaphoresis, fever, malaise/fatigue and weight loss. HENT: Negative for congestion, ear discharge, ear pain, hearing loss, nosebleeds, sore throat and tinnitus. Eyes: Negative for blurred vision, double vision, photophobia, pain, discharge and redness. Respiratory: Negative for cough, hemoptysis, sputum production, shortness of breath, wheezing and stridor. Cardiovascular: Negative for chest pain, palpitations, orthopnea, claudication, leg swelling and PND. Gastrointestinal: Negative for abdominal pain, blood in stool, constipation, diarrhea, heartburn, melena, nausea and vomiting. Genitourinary: Positive for urgency. Negative for dysuria, flank pain, frequency and hematuria. Musculoskeletal: Negative. Negative for back pain, falls, joint pain, myalgias and neck pain. Skin: Negative for itching and rash. Neurological: Negative. Negative for dizziness, tingling, tremors, sensory change, speech change, focal weakness, seizures, loss of consciousness, weakness and headaches. Endo/Heme/Allergies: Negative for environmental allergies and polydipsia. Does not bruise/bleed easily. Psychiatric/Behavioral: Negative for depression, hallucinations, memory loss, substance abuse and suicidal ideas. The patient is not nervous/anxious and does not have insomnia.     All other systems reviewed and are negative. Physical Exam   Constitutional: He is oriented to person, place, and time. He appears well-developed and well-nourished. No distress. HENT:   Head: Normocephalic and atraumatic. Right Ear: External ear normal.   Left Ear: External ear normal.   Nose: Nose normal.   Mouth/Throat: Oropharynx is clear and moist. No oropharyngeal exudate. Eyes: Conjunctivae and EOM are normal. Pupils are equal, round, and reactive to light. Right eye exhibits no discharge. Left eye exhibits no discharge. No scleral icterus. Neck: Normal range of motion. Neck supple. No JVD present. Carotid bruit is not present. No tracheal deviation present. No thyromegaly present. Cardiovascular: Normal rate, regular rhythm, normal heart sounds and intact distal pulses. Exam reveals no gallop and no friction rub. No murmur heard. Pulmonary/Chest: Effort normal and breath sounds normal. No stridor. No respiratory distress. He has no wheezes. He has no rales. He exhibits no tenderness. Abdominal: Soft. Bowel sounds are normal. He exhibits no distension and no mass. There is no tenderness. There is no rebound and no guarding. Genitourinary:   Genitourinary Comments: Prostate: slightly enlarged, smooth, symmetrical, no nodules, guaiac negative     Musculoskeletal: Normal range of motion. He exhibits no edema or tenderness. Lymphadenopathy:     He has no cervical adenopathy. Neurological: He is alert and oriented to person, place, and time. He has normal reflexes. No cranial nerve deficit. He exhibits normal muscle tone. Coordination normal.   Skin: Skin is warm and dry. No rash noted. He is not diaphoretic. No erythema. No pallor. Psychiatric: He has a normal mood and affect. His behavior is normal. Judgment and thought content normal.   Nursing note and vitals reviewed. ASSESSMENT and PLAN   Diagnoses and all orders for this visit:    1.  Physical exam  -     METABOLIC PANEL, COMPREHENSIVE  -     CBC WITH AUTOMATED DIFF  -     LIPID PANEL  -     TSH 3RD GENERATION  -     T4, FREE  -     VITAMIN D, 25 HYDROXY  -     URINALYSIS W/MICROSCOPIC  -     PSA W/ REFLX FREE PSA    2. Encounter for immunization  -     Influenza virus vaccine (QUADRIVALENT PRES FREE SYRINGE) IM ([id number])  -     MO IMMUNIZ ADMIN,1 SINGLE/COMB VAC/TOXOID  - Pt received flu vaccine today in office. Follow-up Disposition:  Return in about 3 months (around 12/19/2018). ADD follow up  Medication risks/benefits/costs/interactions/alternatives discussed with patient. Advised patient to call back or return to office if symptoms worsen/change/persist.  If patient cannot reach us or should anything more severe/urgent arise he/she should proceed directly to the nearest emergency department. Discussed expected course/resolution/complications of diagnosis in detail with patient. Patient given a written after visit summary which includes her diagnoses, current medications and vitals. Patient expressed understanding with the diagnosis and plan. Written by meek Gonzalez, as dictated by Hi Welch M.D.  6:10 PM - 6:29 PM  Total time spent with the patient 19 minutes, greater than 50% of time spent counseling patient.

## 2018-09-21 LAB
25(OH)D3+25(OH)D2 SERPL-MCNC: 44.3 NG/ML (ref 30–100)
ALBUMIN SERPL-MCNC: 4.3 G/DL (ref 3.5–5.5)
ALBUMIN/GLOB SERPL: 2.3 {RATIO} (ref 1.2–2.2)
ALP SERPL-CCNC: 59 IU/L (ref 39–117)
ALT SERPL-CCNC: 22 IU/L (ref 0–44)
APPEARANCE UR: CLEAR
AST SERPL-CCNC: 20 IU/L (ref 0–40)
BACTERIA #/AREA URNS HPF: NORMAL /[HPF]
BASOPHILS # BLD AUTO: 0 X10E3/UL (ref 0–0.2)
BASOPHILS NFR BLD AUTO: 1 %
BILIRUB SERPL-MCNC: 0.4 MG/DL (ref 0–1.2)
BILIRUB UR QL STRIP: NEGATIVE
BUN SERPL-MCNC: 14 MG/DL (ref 6–24)
BUN/CREAT SERPL: 13 (ref 9–20)
CALCIUM SERPL-MCNC: 9 MG/DL (ref 8.7–10.2)
CASTS URNS QL MICRO: NORMAL /LPF
CHLORIDE SERPL-SCNC: 100 MMOL/L (ref 96–106)
CHOLEST SERPL-MCNC: 152 MG/DL (ref 100–199)
CO2 SERPL-SCNC: 27 MMOL/L (ref 20–29)
COLOR UR: YELLOW
CREAT SERPL-MCNC: 1.12 MG/DL (ref 0.76–1.27)
EOSINOPHIL # BLD AUTO: 0.2 X10E3/UL (ref 0–0.4)
EOSINOPHIL NFR BLD AUTO: 3 %
EPI CELLS #/AREA URNS HPF: NORMAL /HPF
ERYTHROCYTE [DISTWIDTH] IN BLOOD BY AUTOMATED COUNT: 13.9 % (ref 12.3–15.4)
GLOBULIN SER CALC-MCNC: 1.9 G/DL (ref 1.5–4.5)
GLUCOSE SERPL-MCNC: 95 MG/DL (ref 65–99)
GLUCOSE UR QL: NEGATIVE
HCT VFR BLD AUTO: 42.9 % (ref 37.5–51)
HDLC SERPL-MCNC: 46 MG/DL
HGB BLD-MCNC: 14.2 G/DL (ref 13–17.7)
HGB UR QL STRIP: NEGATIVE
IMM GRANULOCYTES # BLD: 0 X10E3/UL (ref 0–0.1)
IMM GRANULOCYTES NFR BLD: 0 %
INTERPRETATION, 910389: NORMAL
KETONES UR QL STRIP: NEGATIVE
LDLC SERPL CALC-MCNC: 91 MG/DL (ref 0–99)
LEUKOCYTE ESTERASE UR QL STRIP: NEGATIVE
LYMPHOCYTES # BLD AUTO: 1.6 X10E3/UL (ref 0.7–3.1)
LYMPHOCYTES NFR BLD AUTO: 22 %
MCH RBC QN AUTO: 30.3 PG (ref 26.6–33)
MCHC RBC AUTO-ENTMCNC: 33.1 G/DL (ref 31.5–35.7)
MCV RBC AUTO: 92 FL (ref 79–97)
MICRO URNS: NORMAL
MICRO URNS: NORMAL
MONOCYTES # BLD AUTO: 0.6 X10E3/UL (ref 0.1–0.9)
MONOCYTES NFR BLD AUTO: 9 %
MUCOUS THREADS URNS QL MICRO: PRESENT
NEUTROPHILS # BLD AUTO: 4.8 X10E3/UL (ref 1.4–7)
NEUTROPHILS NFR BLD AUTO: 65 %
NITRITE UR QL STRIP: NEGATIVE
PH UR STRIP: 7 [PH] (ref 5–7.5)
PLATELET # BLD AUTO: 216 X10E3/UL (ref 150–379)
POTASSIUM SERPL-SCNC: 4.5 MMOL/L (ref 3.5–5.2)
PROT SERPL-MCNC: 6.2 G/DL (ref 6–8.5)
PROT UR QL STRIP: NEGATIVE
PSA SERPL-MCNC: 2.2 NG/ML (ref 0–4)
RBC # BLD AUTO: 4.68 X10E6/UL (ref 4.14–5.8)
RBC #/AREA URNS HPF: NORMAL /HPF
REFLEX CRITERIA: NORMAL
SODIUM SERPL-SCNC: 139 MMOL/L (ref 134–144)
SP GR UR: 1.01 (ref 1–1.03)
T4 FREE SERPL-MCNC: 1.62 NG/DL (ref 0.82–1.77)
TRIGL SERPL-MCNC: 73 MG/DL (ref 0–149)
TSH SERPL DL<=0.005 MIU/L-ACNC: 1.48 UIU/ML (ref 0.45–4.5)
UROBILINOGEN UR STRIP-MCNC: 0.2 MG/DL (ref 0.2–1)
VLDLC SERPL CALC-MCNC: 15 MG/DL (ref 5–40)
WBC # BLD AUTO: 7.2 X10E3/UL (ref 3.4–10.8)
WBC #/AREA URNS HPF: NORMAL /HPF

## 2018-10-21 NOTE — PROGRESS NOTES
Inform pt to go to my chart to see results and recommendations    RECOMMENDATIONS:  None. Keep up the good work! Continue with current  Diet. See you as advised.

## 2018-12-19 ENCOUNTER — OFFICE VISIT (OUTPATIENT)
Dept: FAMILY MEDICINE CLINIC | Age: 48
End: 2018-12-19

## 2018-12-19 VITALS
HEART RATE: 72 BPM | OXYGEN SATURATION: 98 % | BODY MASS INDEX: 24.46 KG/M2 | DIASTOLIC BLOOD PRESSURE: 80 MMHG | SYSTOLIC BLOOD PRESSURE: 122 MMHG | WEIGHT: 190.6 LBS | RESPIRATION RATE: 18 BRPM | HEIGHT: 74 IN | TEMPERATURE: 98 F

## 2018-12-19 DIAGNOSIS — H93.13 TINNITUS OF BOTH EARS: ICD-10-CM

## 2018-12-19 DIAGNOSIS — F98.8 ATTENTION DEFICIT DISORDER, UNSPECIFIED HYPERACTIVITY PRESENCE: Primary | ICD-10-CM

## 2018-12-19 DIAGNOSIS — M25.512 ACUTE PAIN OF LEFT SHOULDER: ICD-10-CM

## 2018-12-19 DIAGNOSIS — H91.93 BILATERAL HEARING LOSS, UNSPECIFIED HEARING LOSS TYPE: ICD-10-CM

## 2018-12-19 NOTE — PATIENT INSTRUCTIONS
Hearing Loss: Care Instructions  Your Care Instructions    Hearing loss is a sudden or slow decrease in how well you hear. It can range from mild to severe. Permanent hearing loss can occur with aging. It also can happen when you are exposed long-term to loud noise. Examples include listening to loud music, riding motorcycles, or being around other loud machines. Hearing loss can affect your work and home life. It can make you feel lonely or depressed. You may feel that you have lost your independence. But hearing aids and other devices can help you hear better and feel connected to others. Follow-up care is a key part of your treatment and safety. Be sure to make and go to all appointments, and call your doctor if you are having problems. It's also a good idea to know your test results and keep a list of the medicines you take. How can you care for yourself at home? · Avoid loud noises whenever possible. This helps keep your hearing from getting worse. · Always wear hearing protection around loud noises. · Wear a hearing aid as directed. See a person who can help you pick a hearing aid that fits you. · Have hearing tests as your doctor suggests. They can show whether your hearing has changed. Your hearing aid may need to be adjusted. · Use other devices as needed. These may include:  ? Telephone amplifiers and hearing aids that can connect to a television, stereo, radio, or microphone. ? Devices that use lights or vibrations. These alert you to the doorbell, a ringing telephone, or a baby monitor. ? Television closed-captioning. This shows the words at the bottom of the screen. Most new TVs can do this. ? TTY (text telephone). This lets you type messages back and forth on the telephone instead of talking or listening. These devices are also called TDD. When messages are typed on the keyboard, they are sent over the phone line to a receiving TTY. The message is shown on a monitor.   · Use pagers, fax machines, and email if it is hard for you to communicate by telephone. · Try to learn a listening technique called speech-reading. It is not lip-reading. You pay attention to people's gestures, expressions, posture, and tone of voice. These clues can help you understand what a person is saying. Face the person you are talking to, and have him or her face you. Make sure the lighting is good. You need to see the other person's face clearly. · Think about counseling if you need help to adjust to your hearing loss. When should you call for help? Watch closely for changes in your health, and be sure to contact your doctor if:    · You think your hearing is getting worse.     · You have new symptoms, such as dizziness or nausea. Where can you learn more? Go to http://ja-darwin.info/. Enter F878 in the search box to learn more about \"Hearing Loss: Care Instructions. \"  Current as of: March 28, 2018  Content Version: 11.8  © 4808-8930 [employer]. Care instructions adapted under license by [employer] (which disclaims liability or warranty for this information). If you have questions about a medical condition or this instruction, always ask your healthcare professional. [place] [address] any warranty or liability for your use of this information.

## 2018-12-19 NOTE — PROGRESS NOTES
HISTORY OF PRESENT ILLNESS  Aleta Schafer 50 y.o. male  presents to the office today for ADD f/u. Blood pressure 122/80, pulse 72, temperature 98 °F (36.7 °C), temperature source Oral, resp. rate 18, height 6' 2\" (1.88 m), weight 190 lb 9.6 oz (86.5 kg), SpO2 98 %. Body mass index is 24.47 kg/m². Chief Complaint   Patient presents with    Attention Deficit Disorder     follow up         HPI  ADD: Pt reports that sometimes he forgets to take his second dose during the day but that this hasn't caused any problems because he doesn't get jumpy or shaky throughout the day. He says he has been feeling really good lately. Pt continues with Vyvanse 30 mg BID. Hearing: Pt with hearing loss bilaterally and tinnitus. Pt reports that he had his hearing checked by Dr. Warden Garcia and notes that his hearing has been getting worse and that he feels he's at a point where hearing aids would benefit him. Left shoulder pain: Pt reports that he has had a constant feeling where it felt like a pinched nerve or cramp. He notes that today it isn't bothering him. Advised to let me know if it starts bothering him again. Current Outpatient Medications   Medication Sig Dispense Refill    lisdexamfetamine (VYVANSE) 30 mg capsule Take 1 Cap (30 mg total) by mouth two (2) times a day. Max Daily Amount: 60 mg 60 Cap 0    lisdexamfetamine (VYVANSE) 30 mg capsule Take 1 Cap (30 mg total) by mouth two (2) times a day. Max Daily Amount: 60 mg 60 Cap 0    lisdexamfetamine (VYVANSE) 30 mg capsule Take 1 Cap (30 mg total) by mouth two (2) times a day. Max Daily Amount: 60 mg 60 Cap 0    atorvastatin (LIPITOR) 10 mg tablet TAKE 1 TAB BY MOUTH DAILY. 90 Tab 1    UNITHROID 125 mcg tablet 1 TAB(S) ONCE A DAY ORALLY  1    Cholecalciferol, Vitamin D3, (VITAMIN D3) 2,000 unit cap capsule Take  by mouth daily.  omeprazole (PRILOSEC) 20 mg capsule 10 mg.        Allergies   Allergen Reactions    Soy Anaphylaxis     No past medical history on file. Past Surgical History:   Procedure Laterality Date    HX VASECTOMY       Family History   Problem Relation Age of Onset    High Cholesterol Father     Migraines Sister     Thyroid Disease Sister      Social History     Tobacco Use    Smoking status: Never Smoker    Smokeless tobacco: Never Used   Substance Use Topics    Alcohol use: Yes          Review of Systems   Constitutional: Negative. Negative for malaise/fatigue. HENT: Positive for hearing loss and tinnitus. Eyes: Negative for blurred vision. Respiratory: Negative for shortness of breath. Cardiovascular: Negative for chest pain and leg swelling. Musculoskeletal: Positive for myalgias (left shoulder pain). Neurological: Negative. Negative for dizziness and headaches. All other systems reviewed and are negative. Physical Exam   Constitutional: He is oriented to person, place, and time and well-developed, well-nourished, and in no distress. HENT:   Head: Normocephalic and atraumatic. Neck: Carotid bruit is not present. Cardiovascular: Normal rate, regular rhythm, normal heart sounds and intact distal pulses. Exam reveals no gallop and no friction rub. No murmur heard. Pulmonary/Chest: Effort normal and breath sounds normal. No respiratory distress. He has no wheezes. He has no rales. He exhibits no tenderness. Neurological: He is alert and oriented to person, place, and time. Psychiatric: Mood, memory, affect and judgment normal.        ASSESSMENT and PLAN  Diagnoses and all orders for this visit:    1. Attention deficit disorder, unspecified hyperactivity presence  -     lisdexamfetamine (VYVANSE) 30 mg capsule; Take 1 Cap (30 mg total) by mouth two (2) times a day. Max Daily Amount: 60 mg  -     lisdexamfetamine (VYVANSE) 30 mg capsule; Take 1 Cap (30 mg total) by mouth two (2) times a day. Max Daily Amount: 60 mg  -     lisdexamfetamine (VYVANSE) 30 mg capsule;  Take 1 Cap (30 mg total) by mouth two (2) times a day. Max Daily Amount: 60 mg  -     Pt reports no problems with current medication of Vyvanse 30 mg BID. Advised to continue current regimen. Rx printed, signed, and given to pt    2. Bilateral hearing loss, unspecified hearing loss type        -     Pt saw Dr. Festus Goodpasture (ENT) and has been having worsening hearing loss and tinnitus. He thinks he would benefit from hearing aids at this point. Advised to follow recommendations of Dr. Festus Goodpasture    3. Tinnitus of both ears       -    See above    4. Acute pain of left shoulder        -     Resolved. Advised to return if sxs should return        Follow-up Disposition:  Return in about 3 months (around 3/19/2019) for ADD. Medication risks/benefits/costs/interactions/alternatives discussed with patient. Advised patient to call back or return to office if symptoms worsen/change/persist.  If patient cannot reach us or should anything more severe/urgent arise he/she should proceed directly to the nearest emergency department. Discussed expected course/resolution/complications of diagnosis in detail with patient. Patient given a written after visit summary which includes her diagnoses, current medications and vitals. Patient expressed understanding with the diagnosis and plan. Written by meek Jean, as dictated by Traci Villanueva M.D.  5:48 PM - 6:02 PM  Total time spent with the patient 14 minutes, greater than 50% of time spent counseling patient.

## 2018-12-19 NOTE — PROGRESS NOTES
Chief Complaint   Patient presents with    Attention Deficit Disorder     follow up      Pt presents in office today for follow up with ADD     1. Have you been to the ER, urgent care clinic since your last visit? Hospitalized since your last visit? No    2. Have you seen or consulted any other health care providers outside of the [facility] since your last visit? Include any pap smears or colon screening.  No

## 2019-02-06 ENCOUNTER — OFFICE VISIT (OUTPATIENT)
Dept: FAMILY MEDICINE CLINIC | Age: 49
End: 2019-02-06

## 2019-02-06 VITALS
RESPIRATION RATE: 16 BRPM | HEART RATE: 77 BPM | TEMPERATURE: 95.9 F | BODY MASS INDEX: 25.03 KG/M2 | WEIGHT: 195 LBS | SYSTOLIC BLOOD PRESSURE: 109 MMHG | DIASTOLIC BLOOD PRESSURE: 73 MMHG | HEIGHT: 74 IN | OXYGEN SATURATION: 99 %

## 2019-02-06 DIAGNOSIS — J06.9 UPPER RESPIRATORY TRACT INFECTION, UNSPECIFIED TYPE: Primary | ICD-10-CM

## 2019-02-06 DIAGNOSIS — R20.0 LEFT ARM NUMBNESS: ICD-10-CM

## 2019-02-06 DIAGNOSIS — M54.9 UPPER BACK PAIN ON LEFT SIDE: ICD-10-CM

## 2019-02-06 RX ORDER — OMEPRAZOLE 10 MG/1
10 CAPSULE, DELAYED RELEASE ORAL DAILY
Refills: 0 | COMMUNITY
Start: 2018-12-09 | End: 2019-06-12

## 2019-02-06 RX ORDER — AZITHROMYCIN 250 MG/1
TABLET, FILM COATED ORAL
Qty: 6 TAB | Refills: 0 | Status: SHIPPED | OUTPATIENT
Start: 2019-02-06 | End: 2019-02-11

## 2019-02-06 NOTE — PROGRESS NOTES
Identified pt with two pt identifiers(name and ). Reviewed record in preparation for visit and have obtained necessary documentation. Chief Complaint Patient presents with  Cold Symptoms  
  sinus pressure, nasal drainage, chest congestion with productive cough; sx present since 19; pt states these are same sx wife had last week and was treated with z-pack  Shoulder Pain f/u: constant pain of L shoulder There are no preventive care reminders to display for this patient. Coordination of Care Questionnaire: 
:  
1) Have you been to an emergency room, urgent care, or hospitalized since your last visit?   no If yes, where when, and reason for visit? 2. Have seen or consulted any other health care provider since your last visit? NO If yes, where when, and reason for visit? Patient is accompanied by self I have received verbal consent from Mani Ragland to discuss any/all medical information while they are present in the room.

## 2019-02-06 NOTE — PATIENT INSTRUCTIONS

## 2019-02-06 NOTE — PROGRESS NOTES
HISTORY OF PRESENT ILLNESS Camryn Merida 50 y.o. male  presents to the office today for evaluation of cold symptoms, and f/u for L shoulder pain. Blood pressure 109/73, pulse 77, temperature 95.9 °F (35.5 °C), temperature source Oral, resp. rate 16, height 6' 2\" (1.88 m), weight 195 lb (88.5 kg), SpO2 99 %. Body mass index is 25.04 kg/m². Chief Complaint Patient presents with  Cold Symptoms  
  sinus pressure, nasal drainage, chest congestion with productive cough; sx present since 2/1/19; pt states these are same sx wife had last week and was treated with z-pack  Shoulder Pain f/u: constant pain of L shoulder HPI Upper respiratory tract infection, unspecified type Pt reports he has had a head cold since Friday, that has now progressed to his chest. He reports some inconsistent F/C towards the beginning of the presentation of his symptoms. He endorses sinus pain, some minor SOB and chest tightness, green mucus, and progression of symtoms. I prescribed azithromycin (ZITHROMAX) 250 mg tablet and advised the Pt to take Mucinex 600 mg, twice daily for 7-10 days, to try gilbert tea with honey, and add garlic to his diet for an immune boost. I also advised the Pt to take Airborne daily to boost his immune system. Left arm numbness: Pt describes intermittent left shoulder pain under his shoulder blade, with intermittent numbness. He endorses some neck pain but feels the pain originates from the \"corner of the shoulder blade\". He reports he has had the pain for 1.5 months, and notes he has been doing some work involving raising his arms over his head. He denies any weakness in the arm. He notes the arm \"falls asleep\" while holding his arm up for a while, such as when he is driving, or resting it for a while.  I recommended that the Pt try Physical Therapy, and referred him to [facility], but advised him that if it gets worse we may need to consult a neurologist.  
 
 Health Maintenance: Pt denies needing any medication refills. Pt asked whether he can take the antibiotics with his Unithroid and I explained that it is not a problem to take them both at the same time. Current Outpatient Medications Medication Sig Dispense Refill  omeprazole (PRILOSEC) 10 mg capsule Take 10 mg by mouth daily. 0  
 azithromycin (ZITHROMAX) 250 mg tablet Take 2 tablets today, then take 1 tablet daily 6 Tab 0  
 lisdexamfetamine (VYVANSE) 30 mg capsule Take 1 Cap (30 mg total) by mouth two (2) times a day. Max Daily Amount: 60 mg 60 Cap 0  
 atorvastatin (LIPITOR) 10 mg tablet TAKE 1 TAB BY MOUTH DAILY. 90 Tab 1  
 UNITHROID 125 mcg tablet 1 TAB(S) ONCE A DAY ORALLY  1  
 Cholecalciferol, Vitamin D3, (VITAMIN D3) 2,000 unit cap capsule Take  by mouth daily.  omeprazole (PRILOSEC) 20 mg capsule 10 mg. Allergies Allergen Reactions  Soy Anaphylaxis No past medical history on file. Past Surgical History:  
Procedure Laterality Date  HX VASECTOMY Family History Problem Relation Age of Onset  High Cholesterol Father  Migraines Sister  Thyroid Disease Sister Social History Tobacco Use  Smoking status: Never Smoker  Smokeless tobacco: Never Used Substance Use Topics  Alcohol use: Yes Review of Systems Constitutional: Negative for malaise/fatigue. Eyes: Negative for blurred vision. Respiratory: Negative for shortness of breath. Cardiovascular: Negative for chest pain and leg swelling. Musculoskeletal: Negative. Neurological: Negative for dizziness and headaches. Physical Exam  
Constitutional: He is oriented to person, place, and time.  He appears well-developed and well-nourished. No distress. HENT:  
Head: Normocephalic and atraumatic. Right Ear: Hearing, tympanic membrane, external ear and ear canal normal.  
Left Ear: Hearing, tympanic membrane, external ear and ear canal normal.  
Nose: Right sinus exhibits no maxillary sinus tenderness and no frontal sinus tenderness. Left sinus exhibits no maxillary sinus tenderness and no frontal sinus tenderness. Mouth/Throat: Posterior oropharyngeal erythema (slight) present. Neck: Normal range of motion. Neck supple. Cardiovascular: Normal rate, regular rhythm, normal heart sounds and intact distal pulses. Exam reveals no gallop and no friction rub. No murmur heard. Pulmonary/Chest: Effort normal and breath sounds normal. No respiratory distress. He has no wheezes. He has no rales. He exhibits no tenderness. Abdominal: Soft. Bowel sounds are normal. He exhibits no distension. There is no tenderness. Musculoskeletal: Normal range of motion. He exhibits no edema. Arms: 
Strength is equal and intact in both arms Lymphadenopathy:  
  He has no cervical adenopathy. Neurological: He is alert and oriented to person, place, and time. Skin: He is not diaphoretic. Psychiatric: He has a normal mood and affect. His behavior is normal. Judgment and thought content normal.  
Nursing note and vitals reviewed. ASSESSMENT and PLAN Diagnoses and all orders for this visit: 
 
1. Upper respiratory tract infection, unspecified type I prescribed azithromycin (ZITHROMAX) 250 mg tablet and advised the Pt to take Mucinex 600 mg, twice daily for 7-10 days, to try gilbert tea with honey, and add garlic to his diet for an immune boost. I also advised the Pt to take Airborne daily to boost his immune system. -     azithromycin (ZITHROMAX) 250 mg tablet; Take 2 tablets today, then take 1 tablet daily 2. Upper back pain on left side Left side - unclear as to cause, I referred the Pt to [facility] for PT.  
 
3. Left arm numbness Likely a pinched nerve. Advised Pt if PT doesn't work we will consider sending him to a neurologist of further evaluation.  
-     REFERRAL TO PHYSICAL THERAPY Follow-up Disposition: 
Return if symptoms worsen or fail to improve, for URI. Medication risks/benefits/costs/interactions/alternatives discussed with patient. Advised patient to call back or return to office if symptoms worsen/change/persist.  If patient cannot reach us or should anything more severe/urgent arise he/she should proceed directly to the nearest emergency department. Discussed expected course/resolution/complications of diagnosis in detail with patient. Patient given a written after visit summary which includes her diagnoses, current medications and vitals. Patient expressed understanding with the diagnosis and plan. This document was written by Tra Bass, as dictated by Dr. Ara Lay MD.  
12:38 PM - 12:53 PM 
Total time spent with the patient 15 minutes, greater than 50% of time spent counseling patient.

## 2019-02-11 ENCOUNTER — HOSPITAL ENCOUNTER (OUTPATIENT)
Dept: PHYSICAL THERAPY | Age: 49
Discharge: HOME | End: 2019-02-11
Payer: COMMERCIAL

## 2019-02-11 DIAGNOSIS — R20.0 LEFT ARM NUMBNESS: ICD-10-CM

## 2019-02-11 PROCEDURE — 97140 MANUAL THERAPY 1/> REGIONS: CPT | Performed by: PHYSICAL THERAPY ASSISTANT

## 2019-02-11 PROCEDURE — 97110 THERAPEUTIC EXERCISES: CPT | Performed by: PHYSICAL THERAPY ASSISTANT

## 2019-02-11 PROCEDURE — 97161 PT EVAL LOW COMPLEX 20 MIN: CPT | Performed by: PHYSICAL THERAPY ASSISTANT

## 2019-02-11 NOTE — PROGRESS NOTES
PT INITIAL EVALUATION NOTE - [facility] 2-15 Patient Name: Ni Pritchard Date:2019 : 1970 [x]  Patient  Verified Payor: BLUE CROSS / Plan: [facility] PPO / Product Type: PPO / In time:5:00 pm  Out time:6:05 Total Treatment Time (min): 65 Total Timed Codes (min): 25 
1:1 Treatment Time ([facility] only): 25 Visit #: 1 Treatment Area: Left arm numbness [R20.0] SUBJECTIVE Pain Level (0-10 scale): 2 Any medication changes, allergies to medications, adverse drug reactions, diagnosis change, or new procedure performed?: [] No    [x] Yes (see summary sheet for update) Subjective:   
Pt complains of L shoulder pain that will radiate in to his forearm that started in December after replacing a garage  over his head. Pt is L handed and feels constant pain in his scapular area that worsens when he is reaching for the steering wheel or sitting w/ pressure through his arms while at work. Pt has not received any diagnostic tests. PLOF: active w/ children Mechanism of Injury: replacing garage  Previous Treatment/Compliance: good PMHx/Surgical Hx: see chart Work Hx: [employer] 
Living Situation: 3 children and wife Pt Goals: \"decrease pain radiating in to arm\" Barriers: none Motivation: good Substance use: none FABQ Score: low Cognition: A & O x 4 OBJECTIVE/EXAMINATION Posture: Forward head and rounded shoulders Palpation: TTP over infraspinatus, rhomboids, levator scap Thoracic spine: hypomobile Full cervical motion w/ no pain Full shoulder motion w/ pain w/ overpressure of shoulder flexion UPPER QUARTER   MUSCLE STRENGTH 
KEY       R  L 
0 - No Contraction  C1, C2 Neck Flex 5  5 
1 - Trace   C3 Side Flex  5  5 
2 - Poor   C4 Sh Elev  5  5 
3 - Fair    C5 Deltoid/Biceps 5  5 
4 - Good   C6 Wrist Ext  5  5 
5 - Normal   C7 Triceps  5  5 C8 Thumb Ext  5  5 
    T1 Hand Inst  5  5 MMT: full strength Neurological: Reflexes / Sensations: normal 
Special Tests: - 1st rib cervical flexion rotation test, - spurlings, - strong cielo, - infraspinatus test 
 
 
Modality rationale: decrease edema, decrease inflammation, decrease pain and increase tissue extensibility to improve the patients ability to sit w/ proper posture Min Type Additional Details 10 [x] Estim: []Att   [x]Unatt        []TENS instruct []IFC  []Premod   [x]NMES []Other:  []w/US   [x]w/ice   []w/heat Position: supine Location: L scapula  
 []  Traction: [] Cervical       []Lumbar 
                     [] Prone          []Supine []Intermittent   []Continuous Lbs: 
[] before manual 
[] after manual 
[]w/heat  
 []  Ultrasound: []Continuous   [] Pulsed at: 
                         []1MHz   []3MHz Location: 
W/cm2:  
 [] Paraffin Location:  
[]w/heat  
 []  Ice     []  Heat 
[]  Ice massage Position: Location:  
 []  Laser 
[]  Other: Position: Location:  
 
 []  Vasopneumatic Device Pressure:       [] lo [] med [] hi  
Temperature:   
 
[x] Skin assessment post-treatment:  [x]intact []redness- no adverse reaction 
  []redness  adverse reaction:  
 
15 min Therapeutic Exercise:  [x] See flow sheet :  
Rationale: increase ROM and increase strength to improve the patients ability to reach overhead 10 min Manual Therapy: trigger point release to L rhomboids, infraspinatus, thoracic manip in supine Rationale: decrease pain, increase ROM, increase tissue extensibility and decrease trigger points to improve the patients ability to reach overhead With 
 [] TE 
 [] TA 
 [] neuro 
 [] other: Patient Education: [x] Review HEP [] Progressed/Changed HEP based on:  
[] positioning   [] body mechanics   [] transfers   [] heat/ice application   
[] other:   
 
 
Pain Level (0-10 scale) post treatment: 1 ASSESSMENT/Changes in Function:  
 
[x]  See Plan of Care Joan Garza, PT, DPT 2/11/2019

## 2019-02-11 NOTE — PROGRESS NOTES
[facility] [address] [place] [address] Phone: [phone]  Fax: [phone] Plan of Care/Statement of Necessity for Physical Therapy Services  2-15 Patient name: German Gonzalez  : 1970  Provider#: [id number] Referral source: Carmella Yanez MD     
Medical/Treatment Diagnosis: Left arm numbness [R20.0] Prior Hospitalization: see medical history Comorbidities: see chart Prior Level of Function: working out Medications: Verified on Patient Summary List 
 
Start of Care: 19      Onset Date: 2018 The Plan of Care and following information is based on the information from the initial evaluation. Assessment/ key information: Pt presents w/ L shoulder/scapular pain that affects his ability to reach overhead, drive, perform work duties. Pt has signs and symptoms of muscle trigger points and postural dysfunction. Pt is a good candidate for dry needling to address soft tissue impairments. Evaluation Complexity History LOW Complexity : Zero comorbidities / personal factors that will impact the outcome / POC; Examination LOW Complexity : 1-2 Standardized tests and measures addressing body structure, function, activity limitation and / or participation in recreation  ;Presentation LOW Complexity : Stable, uncomplicated  ;Clinical Decision Making MEDIUM Complexity : FOTO score of 26-74 Overall Complexity Rating: LOW Problem List: pain affecting function, decrease ROM, decrease strength, edema affecting function, decrease ADL/ functional abilitiies, decrease activity tolerance and decrease flexibility/ joint mobility Treatment Plan may include any combination of the following: Therapeutic exercise, Therapeutic activities, Neuromuscular re-education, Physical agent/modality, Manual therapy and Patient education, other: dry needling Patient / Family readiness to learn indicated by: asking questions Persons(s) to be included in education: patient (P) Barriers to Learning/Limitations: None Patient Goal (s): decrease pain when reaching out Patient Self Reported Health Status: excellent Rehabilitation Potential: good Short Term Goals: To be accomplished in 2 weeks: Pt will be independent w/ HEP Pt will report 50% decrease in pain at rest 
Pt will demonstrate proper sitting posture for 20 minutes Long Term Goals: To be accomplished in 6 weeks: Pt will demonstrate ability to sit at desk for 1 hour w/o increase in symptoms Pt will report over 15 point improvement on FOTO Pt will be able to drive for 30 minutes w/o increase in pain Frequency / Duration: Patient to be seen 2 times per week for 6 weeks. Patient/ Caregiver education and instruction: self care [x]  Plan of care has been reviewed with DENISSE Garza, PT, DPT  2/11/2019  
________________________________________________________________________ I certify that the above Therapy Services are being furnished while the patient is under my care. I agree with the treatment plan and certify that this therapy is necessary. [de-identified] Signature:____________________  Date:____________Time: _________

## 2019-02-18 ENCOUNTER — HOSPITAL ENCOUNTER (OUTPATIENT)
Dept: PHYSICAL THERAPY | Age: 49
Discharge: HOME | End: 2019-02-18
Payer: COMMERCIAL

## 2019-02-18 PROCEDURE — 97014 ELECTRIC STIMULATION THERAPY: CPT | Performed by: PHYSICAL THERAPY ASSISTANT

## 2019-02-18 PROCEDURE — 97140 MANUAL THERAPY 1/> REGIONS: CPT | Performed by: PHYSICAL THERAPY ASSISTANT

## 2019-02-18 PROCEDURE — 97110 THERAPEUTIC EXERCISES: CPT | Performed by: PHYSICAL THERAPY ASSISTANT

## 2019-02-18 NOTE — PROGRESS NOTES
PT DAILY TREATMENT NOTE - [facility] 2-15 Patient Name: Krishna Akins Date:2019 : 1970 [x]  Patient  Verified Payor: BLUE CROSS / Plan: [facility] PPO / Product Type: PPO / In time: 5:00 pm  Out time:6:05 Total Treatment Time (min): 65 Total Timed Codes (min): 55 
1:1 Treatment Time ( only): 40 Visit #:  2 Treatment Area: Left arm pain [M79.602] SUBJECTIVE Pain Level (0-10 scale): 1 Any medication changes, allergies to medications, adverse drug reactions, diagnosis change, or new procedure performed?: [x] No    [] Yes (see summary sheet for update) Subjective functional status/changes:   [] No changes reported Pt states he is doing better and feels good after performing his HEP. OBJECTIVE Modality rationale: decrease edema, decrease inflammation and decrease pain to improve the patients ability to reach overhead Type Additional Details 10[] Estim: []Att   [x]Unatt    []TENS instruct []IFC  []Premod   [x]NMES []Other:  []w/US   [x]w/ice   []w/heat Position: supine Location: L rhomboid []  Traction: [] Cervical       []Lumbar 
                     [] Prone          []Supine []Intermittent   []Continuous Lbs: 
[] before manual 
[] after manual 
[]w/heat  
[]  Ultrasound: []Continuous   [] Pulsed  
                    at: []1MHz   []3MHz Location: 
W/cm2:  
[] Paraffin Location:  
[]w/heat  
[]  Ice     []  Heat 
[]  Ice massage Position: Location:  
[]  Laser 
[]  Other: Position: Location:  
[]  Vasopneumatic Device Pressure:       [] lo [] med [] hi  
Temperature:   
 
[x] Skin assessment post-treatment:  [x]intact []redness- no adverse reaction 
  []redness  adverse reaction:  
 
40 min Therapeutic Exercise:  [x] See flow sheet :  
Rationale: increase ROM, increase strength and improve coordination to improve the patients ability to reach overhead 15 min Manual Therapy: P/A mobs to thoracic spine grade 3-4, 1st rib mobs, trigger point release to L upper trap, rhomboids, teres minor Rationale: decrease pain, increase ROM, increase tissue extensibility and decrease trigger points to improve the patients ability to sit w/ proper posture With 
 [x] TE 
 [] TA 
 [] neuro 
 [] other: Patient Education: [x] Review HEP [] Progressed/Changed HEP based on:  
[] positioning   [x] body mechanics   [] transfers   [] heat/ice application   
[] other:   
 
Other Objective/Functional Measures: hypomobile in upper tspine, TTP over L rhomboid along scapular border Pain Level (0-10 scale) post treatment: 0 
 
ASSESSMENT/Changes in Function:  
Pt tolerated there-ex well w/ no increase in symptoms. Pt needs verbal cues to squeeze scapula at end range of shoulder row. Patient will continue to benefit from skilled PT services to modify and progress therapeutic interventions, address functional mobility deficits, address ROM deficits, address strength deficits, analyze and address soft tissue restrictions, analyze and cue movement patterns and analyze and modify body mechanics/ergonomics to attain remaining goals. []  See Plan of Care 
[]  See progress note/recertification 
[]  See Discharge Summary Progress towards goals / Updated goals: Pt progressing PLAN [x]  Upgrade activities as tolerated     [x]  Continue plan of care 
[]  Update interventions per flow sheet      
[]  Discharge due to:_ 
[]  Other:_   
 
Dennis Rehman, PT, DPT 2/18/2019

## 2019-02-21 ENCOUNTER — HOSPITAL ENCOUNTER (OUTPATIENT)
Dept: PHYSICAL THERAPY | Age: 49
Discharge: HOME | End: 2019-02-21
Payer: COMMERCIAL

## 2019-02-21 PROCEDURE — 97140 MANUAL THERAPY 1/> REGIONS: CPT | Performed by: PHYSICAL THERAPY ASSISTANT

## 2019-02-21 PROCEDURE — 97014 ELECTRIC STIMULATION THERAPY: CPT | Performed by: PHYSICAL THERAPY ASSISTANT

## 2019-02-21 PROCEDURE — 97110 THERAPEUTIC EXERCISES: CPT | Performed by: PHYSICAL THERAPY ASSISTANT

## 2019-02-21 NOTE — PROGRESS NOTES
PT DAILY TREATMENT NOTE - [facility] 2-15 Patient Name: German Gonzalez Date:2019 : 1970 [x]  Patient  Verified Payor: BLUE CROSS / Plan: [facility] PPO / Product Type: PPO / In time: 3:00 pm  Out time:4:1 Total Treatment Time (min): 70 Total Timed Codes (min): 60 
1:1 Treatment Time ( only): 40 Visit #:  3 Treatment Area: Left arm pain [M79.162] SUBJECTIVE Pain Level (0-10 scale): 1 Any medication changes, allergies to medications, adverse drug reactions, diagnosis change, or new procedure performed?: [x] No    [] Yes (see summary sheet for update) Subjective functional status/changes:   [] No changes reported Pt states he was sore after last session but feels better today. OBJECTIVE Modality rationale: decrease edema, decrease inflammation and decrease pain to improve the patients ability to reach overhead Type Additional Details 10[x] Estim: []Att   [x]Unatt    []TENS instruct []IFC  []Premod   [x]NMES []Other:  []w/US   [x]w/ice   []w/heat Position: supine Location: L rhomboid []  Traction: [] Cervical       []Lumbar 
                     [] Prone          []Supine []Intermittent   []Continuous Lbs: 
[] before manual 
[] after manual 
[]w/heat  
[]  Ultrasound: []Continuous   [] Pulsed  
                    at: []1MHz   []3MHz Location: 
W/cm2:  
[] Paraffin Location:  
[]w/heat  
[]  Ice     []  Heat 
[]  Ice massage Position: Location:  
[]  Laser 
[]  Other: Position: Location:  
[]  Vasopneumatic Device Pressure:       [] lo [] med [] hi  
Temperature:   
 
[x] Skin assessment post-treatment:  [x]intact []redness- no adverse reaction 
  []redness  adverse reaction:  
 
45 min Therapeutic Exercise:  [x] See flow sheet :  
Rationale: increase ROM, increase strength and improve coordination to improve the patients ability to reach overhead 15 min Manual Therapy: P/A mobs to thoracic spine grade 3-4, 1st rib mobs, trigger point release to L upper trap, rhomboids, teres minor Rationale: decrease pain, increase ROM, increase tissue extensibility and decrease trigger points to improve the patients ability to sit w/ proper posture With 
 [x] TE 
 [] TA 
 [] neuro 
 [] other: Patient Education: [x] Review HEP [] Progressed/Changed HEP based on:  
[] positioning   [x] body mechanics   [] transfers   [] heat/ice application   
[] other:   
 
Other Objective/Functional Measures: hypomobile in upper tspine, TTP over L rhomboid along scapular border Pain Level (0-10 scale) post treatment: 0 
 
ASSESSMENT/Changes in Function:  
Pt felt better after there-ex and is much less TTP over rhomboids and infraspinatus. Pt educated on return to gym activities. Patient will continue to benefit from skilled PT services to modify and progress therapeutic interventions, address functional mobility deficits, address ROM deficits, address strength deficits, analyze and address soft tissue restrictions, analyze and cue movement patterns and analyze and modify body mechanics/ergonomics to attain remaining goals. []  See Plan of Care 
[]  See progress note/recertification 
[]  See Discharge Summary Progress towards goals / Updated goals: Pt progressing PLAN [x]  Upgrade activities as tolerated     [x]  Continue plan of care 
[]  Update interventions per flow sheet      
[]  Discharge due to:_ 
[]  Other:_   
 
Timothy Colón, PT, DPT 2/21/2019

## 2019-02-25 ENCOUNTER — HOSPITAL ENCOUNTER (OUTPATIENT)
Dept: PHYSICAL THERAPY | Age: 49
Discharge: HOME | End: 2019-02-25
Payer: COMMERCIAL

## 2019-02-25 PROCEDURE — 97110 THERAPEUTIC EXERCISES: CPT | Performed by: PHYSICAL THERAPY ASSISTANT

## 2019-02-25 PROCEDURE — 97140 MANUAL THERAPY 1/> REGIONS: CPT | Performed by: PHYSICAL THERAPY ASSISTANT

## 2019-02-25 NOTE — PROGRESS NOTES
PT DAILY TREATMENT NOTE - [facility] 2-15 Patient Name: Marbin Au Date:2019 : 1970 [x]  Patient  Verified Payor: BLUE CROSS / Plan: [facility] PPO / Product Type: PPO / In time: 12:05 pm  Out time:12:50 pm 
Total Treatment Time (min): 45 Total Timed Codes (min): 45 
1:1 Treatment Time ([facility] only): 45 Visit #:  4 Treatment Area: Left arm pain [M79.602] SUBJECTIVE Pain Level (0-10 scale): 2 Any medication changes, allergies to medications, adverse drug reactions, diagnosis change, or new procedure performed?: [x] No    [] Yes (see summary sheet for update) Subjective functional status/changes:   [] No changes reported Pt states he is having significantly less pain but still feeling tight in the back of his shoulder blade. OBJECTIVE Modality rationale: decrease edema, decrease inflammation and decrease pain to improve the patients ability to reach overhead Type Additional Details Estim: []Att   []Unatt    []TENS instruct []IFC  []Premod   []NMES []Other:  []w/US   []w/ice   []w/heat Position: Location:   
[]  Traction: [] Cervical       []Lumbar 
                     [] Prone          []Supine []Intermittent   []Continuous Lbs: 
[] before manual 
[] after manual 
[]w/heat  
[]  Ultrasound: []Continuous   [] Pulsed  
                    at: []1MHz   []3MHz Location: 
W/cm2:  
[] Paraffin Location:  
[]w/heat  
[]  Ice     []  Heat 
[]  Ice massage Position: Location:  
[]  Laser 
[]  Other: Position: Location:  
[]  Vasopneumatic Device Pressure:       [] lo [] med [] hi  
Temperature:   
 
[x] Skin assessment post-treatment:  [x]intact []redness- no adverse reaction 
  []redness  adverse reaction:  
 
30 min Therapeutic Exercise:  [x] See flow sheet :  
Rationale: increase ROM, increase strength and improve coordination to improve the patients ability to reach overhead 15 min Manual Therapy: P/A mobs to thoracic spine grade 3-4, 1st rib mobs, trigger point release to L upper trap, rhomboids, teres minor Rationale: decrease pain, increase ROM, increase tissue extensibility and decrease trigger points to improve the patients ability to sit w/ proper posture With 
 [x] TE 
 [] TA 
 [] neuro 
 [] other: Patient Education: [x] Review HEP [] Progressed/Changed HEP based on:  
[] positioning   [x] body mechanics   [] transfers   [] heat/ice application   
[] other:   
 
Other Objective/Functional Measures: hypomobile in upper tspine, TTP over L infaspinatus Pain Level (0-10 scale) post treatment: 0 
 
ASSESSMENT/Changes in Function:  
Pt tolerated there-ex well and demonstrates improved ability to sit w/ proper posture. Pt is very TTP over infraspinatus and this reproduces his symptoms. Pt is a good candidate for dry needling. Patient will continue to benefit from skilled PT services to modify and progress therapeutic interventions, address functional mobility deficits, address ROM deficits, address strength deficits, analyze and address soft tissue restrictions, analyze and cue movement patterns and analyze and modify body mechanics/ergonomics to attain remaining goals. []  See Plan of Care 
[]  See progress note/recertification 
[]  See Discharge Summary Progress towards goals / Updated goals: Pt progressing PLAN [x]  Upgrade activities as tolerated     [x]  Continue plan of care 
[]  Update interventions per flow sheet      
[]  Discharge due to:_ 
[]  Other:_   
 
Chas Garsia, PT, DPT 2/25/2019

## 2019-02-25 NOTE — PROGRESS NOTES
[facility], [address] [place] [address] Phone: [phone] Fax: [phone] Progress Note Name: Timothy Ocampo : 1970 MD: Dmitry Leong MD 
  
 
Treatment Diagnosis: Left arm pain [M79.602] Start of Care: 2019 Visits from Start of Care: 4 Missed Visits: 0 Summary of Care: manual therapy, stretching, e-stim, there-ex, postural re-ed Assessment / Recommendations: Korey Leblanc has made overall good progress w/ PT and his pain has significantly decreased. However, he still experiences stiffness throughout the day in his scapular region. He is very TTP over his rhomboids and infraspinatus. Korey Leblanc is a good candidate for dry needling to address his soft tissue restrictions. Recommendation: dry needling 2-5 sessions Theodora Garza, PT, DPT  2019  
 
________________________________________________________________________ NOTE TO PHYSICIAN:  Please complete the following and fax to: [facility]: [phone] Isabel Begum Retain this original for your records. If you are unable to process this request in 24 hours, please contact our office. ____ I have read the above report and request that my patient continue therapy with the following changes/special instructions: 
____ I have read the above report and request that my patient be discharged from therapy [de-identified] Signature:_________________ Date:___________Time:__________

## 2019-02-27 ENCOUNTER — HOSPITAL ENCOUNTER (OUTPATIENT)
Dept: PHYSICAL THERAPY | Age: 49
End: 2019-02-27
Payer: COMMERCIAL

## 2019-03-08 ENCOUNTER — HOSPITAL ENCOUNTER (OUTPATIENT)
Dept: PHYSICAL THERAPY | Age: 49
Discharge: HOME | End: 2019-03-08
Payer: COMMERCIAL

## 2019-03-08 PROCEDURE — 97110 THERAPEUTIC EXERCISES: CPT | Performed by: PHYSICAL THERAPY ASSISTANT

## 2019-03-08 PROCEDURE — 97140 MANUAL THERAPY 1/> REGIONS: CPT | Performed by: PHYSICAL THERAPY ASSISTANT

## 2019-03-08 NOTE — PROGRESS NOTES
PT DAILY TREATMENT NOTE - [facility] 2-15    Patient Name: Norma Police  Date:3/8/2019  : 1970  [x]  Patient  Verified  Payor: BLUE CROSS / Plan: [facility] PPO / Product Type: PPO /    In time: 8:05 Am  Out time:9:00 Am  Total Treatment Time (min): 55  Total Timed Codes (min): 45  1:1 Treatment Time (MC only): 30   Visit #:  5    Treatment Area: Left arm pain [M79.602]    SUBJECTIVE  Pain Level (0-10 scale): 1-2  Any medication changes, allergies to medications, adverse drug reactions, diagnosis change, or new procedure performed?: [x] No    [] Yes (see summary sheet for update)  Subjective functional status/changes:   [] No changes reported  Pt states inquiring as to if we have received notification from his MD regarding dry needling. States overall doing better, decreased TTP since using a lacrosse ball for self TPR.     OBJECTIVE    Modality rationale: decrease edema, decrease inflammation and decrease pain to improve the patients ability to reach overhead   Type Additional Details    Estim: []Att   []Unatt    []TENS instruct                  []IFC  []Premod   []NMES                     []Other:  []w/US   []w/ice   []w/heat  Position:   Location:    []  Traction: [] Cervical       []Lumbar                       [] Prone          []Supine                       []Intermittent   []Continuous Lbs:  [] before manual  [] after manual  []w/heat   []  Ultrasound: []Continuous   [] Pulsed                       at: []1MHz   []3MHz Location:  W/cm2:   [] Paraffin         Location:   []w/heat   []  Ice     []  Heat  []  Ice massage Position:  Location:   []  Laser  []  Other: Position:  Location:   []  Vasopneumatic Device Pressure:       [] lo [] med [] hi   Temperature:      [x] Skin assessment post-treatment:  [x]intact []redness- no adverse reaction    []redness  adverse reaction:     35 min Therapeutic Exercise:  [x] See flow sheet :   Rationale: increase ROM, increase strength and improve coordination to improve the patients ability to reach overhead    20 min Manual Therapy: P/A mobs to thoracic spine grade 3-4, 1st rib mobs, trigger point release to L upper trap, rhomboids, teres minor    Rationale: decrease pain, increase ROM, increase tissue extensibility and decrease trigger points to improve the patients ability to sit w/ proper posture              With   [x] TE   [] TA   [] neuro   [] other: Patient Education: [x] Review HEP    [] Progressed/Changed HEP based on:   [] positioning   [x] body mechanics   [] transfers   [] heat/ice application    [] other:      Other Objective/Functional Measures: Mild-Mod TTP along L UT and infraspinatus muscles    Pain Level (0-10 scale) post treatment: 0    ASSESSMENT/Changes in Function:   Minor cues during bird dog to maintain neutral lumbar spine and to engage serratus anterior on WB UE. Patient will continue to benefit from skilled PT services to modify and progress therapeutic interventions, address functional mobility deficits, address ROM deficits, address strength deficits, analyze and address soft tissue restrictions, analyze and cue movement patterns and analyze and modify body mechanics/ergonomics to attain remaining goals.      []  See Plan of Care  []  See progress note/recertification  []  See Discharge Summary         Progress towards goals / Updated goals:  Not assessed    PLAN  [x]  Upgrade activities as tolerated     [x]  Continue plan of care  []  Update interventions per flow sheet       []  Discharge due to:_  [x]  Other:f/u regarding DN request sent to MD Yuko Hernandez, PTA 3/8/2019

## 2019-03-10 DIAGNOSIS — E78.2 MIXED HYPERLIPIDEMIA: ICD-10-CM

## 2019-03-12 RX ORDER — ATORVASTATIN CALCIUM 10 MG/1
TABLET, FILM COATED ORAL
Qty: 90 TAB | Refills: 1 | Status: SHIPPED | OUTPATIENT
Start: 2019-03-12 | End: 2019-09-07 | Stop reason: SDUPTHER

## 2019-03-20 ENCOUNTER — OFFICE VISIT (OUTPATIENT)
Dept: FAMILY MEDICINE CLINIC | Age: 49
End: 2019-03-20

## 2019-03-20 VITALS
HEIGHT: 74 IN | HEART RATE: 83 BPM | BODY MASS INDEX: 24.38 KG/M2 | DIASTOLIC BLOOD PRESSURE: 78 MMHG | RESPIRATION RATE: 16 BRPM | OXYGEN SATURATION: 98 % | TEMPERATURE: 98.1 F | SYSTOLIC BLOOD PRESSURE: 124 MMHG | WEIGHT: 190 LBS

## 2019-03-20 DIAGNOSIS — M25.512 CHRONIC LEFT SHOULDER PAIN: ICD-10-CM

## 2019-03-20 DIAGNOSIS — F98.8 ATTENTION DEFICIT DISORDER, UNSPECIFIED HYPERACTIVITY PRESENCE: Primary | ICD-10-CM

## 2019-03-20 DIAGNOSIS — G89.29 CHRONIC LEFT SHOULDER PAIN: ICD-10-CM

## 2019-03-20 NOTE — PROGRESS NOTES
Chief Complaint   Patient presents with    Medication Refill     3 month follow up adderall     1. Have you been to the ER, urgent care clinic since your last visit? Hospitalized since your last visit? No    2. Have you seen or consulted any other health care providers outside of the [address] [place] [address] since your last visit? Include any pap smears or colon screening.  No

## 2019-03-20 NOTE — PROGRESS NOTES
HPI  Tiffany Peña 50 y.o. male  presents to the office today for medication refill. Blood pressure 124/78, pulse 83, temperature 98.1 °F (36.7 °C), temperature source Oral, resp. rate 16, height 6' 2\" (1.88 m), weight 190 lb (86.2 kg), SpO2 98 %. Body mass index is 24.39 kg/m². Chief Complaint   Patient presents with    Medication Refill     3 month follow up adderall      ADD: Pt continues with Vyvanse 30 mg BID. He notes his motivation and focus have been stable. Pt notes he has been more stressed due to car searching but overall the medication has been doing well for him. Provided pt 3 Rx for Vyvanse 30 mg/BID. Left Shoulder Pain: Pt has followed up with PT and therapist confirmed left shoulder pain is muscle related. He was recommended dry needling and he would like to pursue this option. Provided pt Rx for dry needling. He will continue to follow up with PT. Current Outpatient Medications   Medication Sig Dispense Refill    atorvastatin (LIPITOR) 10 mg tablet TAKE 1 TABLET BY MOUTH EVERY DAY 90 Tab 1    omeprazole (PRILOSEC) 10 mg capsule Take 10 mg by mouth daily. 0    lisdexamfetamine (VYVANSE) 30 mg capsule Take 1 Cap (30 mg total) by mouth two (2) times a day. Max Daily Amount: 60 mg 60 Cap 0    lisdexamfetamine (VYVANSE) 30 mg capsule Take 1 Cap (30 mg total) by mouth two (2) times a day. Max Daily Amount: 60 mg 60 Cap 0    lisdexamfetamine (VYVANSE) 30 mg capsule Take 1 Cap (30 mg total) by mouth two (2) times a day. Max Daily Amount: 60 mg 60 Cap 0    UNITHROID 125 mcg tablet 1 TAB(S) ONCE A DAY ORALLY  1    Cholecalciferol, Vitamin D3, (VITAMIN D3) 2,000 unit cap capsule Take  by mouth daily.  omeprazole (PRILOSEC) 20 mg capsule 10 mg. Allergies   Allergen Reactions    Soy Anaphylaxis     No past medical history on file.   Past Surgical History:   Procedure Laterality Date    HX VASECTOMY       Family History   Problem Relation Age of Onset    High Cholesterol Father     Migraines Sister     Thyroid Disease Sister      Social History     Tobacco Use    Smoking status: Never Smoker    Smokeless tobacco: Never Used   Substance Use Topics    Alcohol use: Yes        Review of Systems   Constitutional: Negative for chills and fever. HENT: Negative for hearing loss and tinnitus. Eyes: Negative for blurred vision and double vision. Respiratory: Negative for shortness of breath. Cardiovascular: Negative for chest pain and palpitations. Gastrointestinal: Negative for nausea and vomiting. Genitourinary: Negative for dysuria and frequency. Musculoskeletal: Negative for back pain and falls. Skin: Negative for itching and rash. Neurological: Negative for dizziness, loss of consciousness and headaches. Psychiatric/Behavioral: Negative for depression. The patient is not nervous/anxious. Physical Exam   Constitutional: He is oriented to person, place, and time. He appears well-developed and well-nourished. HENT:   Head: Normocephalic and atraumatic. Right Ear: External ear normal.   Left Ear: External ear normal.   Nose: Nose normal.   Mouth/Throat: Oropharynx is clear and moist.   Eyes: Conjunctivae and EOM are normal.   Neck: Normal range of motion. Neck supple. Cardiovascular: Normal rate, regular rhythm, normal heart sounds and intact distal pulses. Pulmonary/Chest: Effort normal and breath sounds normal.   Abdominal: Soft. Bowel sounds are normal.   Genitourinary: Testes normal.   Musculoskeletal: Normal range of motion. Neurological: He is alert and oriented to person, place, and time. Skin: Skin is warm and dry. Psychiatric: He has a normal mood and affect. His behavior is normal. Judgment and thought content normal.   Nursing note and vitals reviewed. ASSESSMENT and PLAN  Diagnoses and all orders for this visit:    1.  Attention deficit disorder, unspecified hyperactivity presence  Pt tolerating medication well and provided pt 3 Rx for Vyvanse 30 mg/BID. -     lisdexamfetamine (VYVANSE) 30 mg capsule; Take 1 Cap (30 mg total) by mouth two (2) times a day. Max Daily Amount: 60 mg  -     lisdexamfetamine (VYVANSE) 30 mg capsule; Take 1 Cap (30 mg total) by mouth two (2) times a day. Max Daily Amount: 60 mg  -     lisdexamfetamine (VYVANSE) 30 mg capsule; Take 1 Cap (30 mg total) by mouth two (2) times a day. Max Daily Amount: 60 mg    2. Chronic left shoulder pain  Provided pt Rx for dry needling. He will continue to follow up for PT. Follow up Disposition: Follow up 3 months for ADD check. Medication risks/benefits/costs/interactions/alternatives discussed with patient. Advised patient to call back or return to office if symptoms worsen/change/persist.  If patient cannot reach us or should anything more severe/urgent arise he/she should proceed directly to the nearest emergency department. Discussed expected course/resolution/complications of diagnosis in detail with patient. Patient given a written after visit summary which includes her diagnoses, current medications and vitals. Patient expressed understanding with the diagnosis and plan. Written by meek Gallagher, as dictated by Oliver Rea M.D.    4:33 PM - 4:45 PM    Total time spent with the patient 12 minutes, greater than 50% of time spent counseling patient.

## 2019-03-20 NOTE — PATIENT INSTRUCTIONS
Shoulder Blade: Exercises  Your Care Instructions  Here are some examples of typical exercises for your condition. Start each exercise slowly. Ease off the exercise if you start to have pain. Your doctor or physical therapist will tell you when you can start these exercises and which ones will work best for you. How to do the exercises  Shoulder roll    1. Stand tall with your chin slightly tucked. Imagine that a string at the top of your head is pulling you straight up. 2. Keep your arms relaxed. All motion will be in your shoulders. 3. Shrug your shoulders up toward your ears, then up and back. [place] your shoulders down and back, like you're sliding your hands down into your back pants pockets. 4. Repeat the circles at least 2 to 4 times. 5. This exercise is also helpful anytime you want to relax. Lower neck and upper back stretch    1. With your arms about shoulder height, clasp your hands in front of you. 2. Drop your chin toward your chest.  3. Reach straight forward so you are rounding your upper back. Think about pulling your shoulder blades apart. [place] feel a stretch across your upper back and shoulders. Hold for at least 6 seconds. 4. Repeat 2 to 4 times. Triceps stretch    1. Reach your arm straight up. 2. Keeping your elbow in place, bend your arm and reach your hand down behind your back. 3. With your other hand, apply gentle pressure to the bent elbow. [place] feel a stretch at the back of your upper arm and shoulder. Hold about 6 seconds. 4. Repeat 2 to 4 times with each arm. Shoulder stretch    1. Relax your shoulders. 2. Raise one arm to shoulder height, and reach it across your chest.  3. Pull the arm slightly toward you with your other arm. This will help you get a gentle stretch. Hold for about 6 seconds. 4. Repeat 2 to 4 times. Shoulder blade squeeze    1. Sit or stand up tall with your arms at your sides.   2. Keep your shoulders relaxed and down, not shrugged. 3. Squeeze your shoulder blades together. Hold for 6 seconds, then relax. 4. Repeat 8 to 12 times. Straight-arm shoulder blade squeeze    1. Sit or stand tall. Relax your shoulders. 2. With palms down, hold your elastic tubing or band straight out in front of you. 3. Start with slight tension in the tubing or band, with your hands about shoulder-width apart. 4. Slowly pull straight out to the sides, squeezing your shoulder blades together. Keep your arms straight and at shoulder height. Slowly release. 5. Repeat 8 to 12 times. Rowing    1. [place] your elastic tubing or band at about waist height. Take one end in each hand. 2. Sit or stand with your feet hip-width apart. 3. Hold your arms straight in front of you. Adjust your distance to create slight tension in the tubing or band. 4. Slightly tuck your chin. Relax your shoulders. 5. Without shrugging your shoulders, pull straight back. Your elbows will pass alongside your waist.    Pull-downs    1. [place] your elastic tubing or band in the top of a closed door. Take one end in each hand. 2. Either sit or stand, depending on what is more comfortable. If you feel unsteady, sit on a chair. 3. Start with your arms up and comfortably apart, elbows straight. There should be a slight tension in the tubing or band. 4. Slightly tuck your chin, and look straight ahead. 5. Keeping your back straight, slowly pull down and back, bending your elbows. 6. Stop where your hands are level with your chin, in a \"goalpost\" position. 7. Repeat 8 to 12 times. Chest T stretch    1. Lie on your back. Raise your knees so they are bent. Plant your feet on the floor, hip-width apart. 2. Tuck your chin, and relax your shoulders. 3. Reach your arms straight out to the sides. If you don't feel a mild stretch in your shoulders and across your chest, use a foam roll or a tightly rolled blanket under your spine, from your tailbone to your head.   4. Relax in this position for at least 15 to 30 seconds while you breathe normally. Repeat 2 to 4 times. 5. As you get used to this stretch, keep adding a little more time until you are able relax in this position for 2 or 3 minutes. When you can relax for at least 2 minutes, you only need to do the exercise 1 time per session. Chest goalpost stretch    1. Lie on your back. Raise your knees so they are bent. Plant your feet on the floor, hip-width apart. 2. Tuck your chin, and relax your shoulders. 3. Reach your arms straight out to the sides. 4. Bend your arms at the elbows, with your hands pointed toward the top of your head. Your arms should make an L on either side of your head. Your palms should be facing up. 5. If you don't feel a mild stretch in your shoulders and across your chest, use a foam roll or tightly rolled blanket under your spine, from your tailbone to your head. 6. Relax in this position for at least 15 to 30 seconds while you breathe normally. Repeat 2 to 4 times. 7. Each day you do this exercise, add a little more time until you can relax in this position for 2 or 3 minutes. When you can relax for at least 2 minutes, you only need to do the exercise 1 time per session. Follow-up care is a key part of your treatment and safety. Be sure to make and go to all appointments, and call your doctor if you are having problems. It's also a good idea to know your test results and keep a list of the medicines you take. Where can you learn more? Go to http://ja-darwin.info/. Enter (05) [phone] in the search box to learn more about \"Shoulder Blade: Exercises. \"  Current as of: September 20, 2018  Content Version: 11.9  © 5964-5591 [employer]. Care instructions adapted under license by [employer] (which disclaims liability or warranty for this information).  If you have questions about a medical condition or this instruction, always ask your healthcare professional. [employer] disclaims any warranty or liability for your use of this information.

## 2019-04-04 ENCOUNTER — HOSPITAL ENCOUNTER (OUTPATIENT)
Dept: PHYSICAL THERAPY | Age: 49
Discharge: HOME | End: 2019-04-04
Payer: COMMERCIAL

## 2019-04-04 PROCEDURE — 97140 MANUAL THERAPY 1/> REGIONS: CPT | Performed by: PHYSICAL THERAPY ASSISTANT

## 2019-04-04 NOTE — PROGRESS NOTES
PT DAILY TREATMENT NOTE - [facility] 2-15 Patient Name: Pérez Barrios Date:2019 : 1970 [x]  Patient  Verified Payor: BLUE CROSS / Plan: [facility] PPO / Product Type: PPO / In time: 1:50 pm  Out time: 2:30 pm 
Total Treatment Time (min): 40 Total Timed Codes (min): 30 
1:1 Treatment Time ( only): 30 Visit #:  6 Treatment Area: Left arm pain [M79.602] SUBJECTIVE Pain Level (0-10 scale): 1 Any medication changes, allergies to medications, adverse drug reactions, diagnosis change, or new procedure performed?: [x] No    [] Yes (see summary sheet for update) Subjective functional status/changes:   [] No changes reported Pt states he has been doing pretty well overall but has some soreness in his L upper trap and shoulder blade. No more numbness in arm. OBJECTIVE Modality rationale: decrease edema, decrease inflammation and decrease pain to improve the patients ability to reach overhead Type Additional Details Estim: []Att   []Unatt    []TENS instruct []IFC  []Premod   []NMES []Other:  []w/US   []w/ice   []w/heat Position: Location:   
[]  Traction: [] Cervical       []Lumbar 
                     [] Prone          []Supine []Intermittent   []Continuous Lbs: 
[] before manual 
[] after manual 
[]w/heat  
[]  Ultrasound: []Continuous   [] Pulsed  
                    at: []1MHz   []3MHz Location: 
W/cm2:  
[] Paraffin Location:  
[]w/heat  
[]  Ice     [x]  Heat 
[]  Ice massage Position: supine Location: L shoulder- 10' []  Laser 
[]  Other: Position: Location:  
[]  Vasopneumatic Device Pressure:       [] lo [] med [] hi  
Temperature:   
 
[x] Skin assessment post-treatment:  [x]intact []redness- no adverse reaction 
  []redness  adverse reaction: OBJECTIVE 
10' Modalities: MH used post-tx to decrease irritation, pain, and prevent soreness. *Skin assessment post-treatment: [x]  intact    [x]  redness- no adverse reaction    
[]redness  adverse reaction:     
 
30 min Manual Therapy: DN as noted in separate note below; STM to upper trap, levator scap, infraspinatus, Rationale:      decrease pain, increase ROM, increase tissue extensibility and decrease trigger points to improve patient's ability to perform ADL's. With MT  Patient Education:  YES  Reviewed HEP Other Objective Measures:TTP over L upper trap, levator scap, infraspinatus, teres minor/major Dry Needling Procedure Note Dry Needle Session Number:  1 Procedure: An intramuscular manual therapy (dry needling) and a neuro-muscular re-education treatment was done to deactivate myofascial trigger points, with a solid filament needle, under aseptic technique. Indication(s): [] Muscle spasms [] Headaches  [] TMJD  
   [] Muscle imbalances [x] Myofascial pain & dysfunction 
   [] Decreased ROM 
 
TIMEOUT PERFORMED:   
2:20 (enter time the timeout was completed) Francy Keys (enter who was present) Informed Consent Obtained: [x] Verbal  [x] Written The following items were reviewed with the patient: 
-Purpose of dry needling, side effects, possible complications, and the informed consent  
-The need to report the use of blood thinners and/or immunosuppressant medications 
-How to respond to possible adverse effects of the treatment 
-Self treatment of post needling soreness: ice/heat, stretching, and activity modification.  
-Opportunity was given to ask any questions, all questions were answered Treatment: The following muscles were treated today: 
Madi: 
Right: 
Left: upper trap, levator scap, infraspinatus, teres minor/major 50 mm x .3 mm *hemostasis applied after each needle was applied. Soft tissue mobilization to above areas Patients response:  
[x]  LTRs  []  Muscle Relaxation  [x]  Pain Relief  
[]  Decreased HAs [x]  Post-needling soreness []  Increased ROM With 
 [x] TE 
 [] TA 
 [] neuro 
 [] other: Patient Education: [x] Review HEP [] Progressed/Changed HEP based on:  
[] positioning   [x] body mechanics   [] transfers   [] heat/ice application   
[] other:   
 
 
Pain Level (0-10 scale) post treatment: 0 
 
ASSESSMENT/Changes in Function:  
Pt tolerated dry needling session well w/ twitch contractions in upper trap. Patient will continue to benefit from skilled PT services to modify and progress therapeutic interventions, address functional mobility deficits, address ROM deficits, address strength deficits, analyze and address soft tissue restrictions, analyze and cue movement patterns and analyze and modify body mechanics/ergonomics to attain remaining goals. []  See Plan of Care 
[]  See progress note/recertification 
[]  See Discharge Summary Progress towards goals / Updated goals: 
Not assessed PLAN [x]  Upgrade activities as tolerated     [x]  Continue plan of care 
[]  Update interventions per flow sheet      
[]  Discharge due to:_ 
[]  Other: 
 
Salomón May, PT, DPT 4/4/2019

## 2019-04-11 ENCOUNTER — HOSPITAL ENCOUNTER (OUTPATIENT)
Dept: PHYSICAL THERAPY | Age: 49
Discharge: HOME | End: 2019-04-11
Payer: COMMERCIAL

## 2019-04-11 PROCEDURE — 97140 MANUAL THERAPY 1/> REGIONS: CPT | Performed by: PHYSICAL THERAPY ASSISTANT

## 2019-04-11 NOTE — PROGRESS NOTES
PT DAILY TREATMENT NOTE - [facility] 2-15 Patient Name: Darin Walsh Date:2019 : 1970 [x]  Patient  Verified Payor: BLUE CROSS / Plan: [facility] PPO / Product Type: PPO / In time: 8:00 am  Out time: 8:40 am 
Total Treatment Time (min): 40 Total Timed Codes (min): 30 
1:1 Treatment Time (MC only): 30 Visit #:  9 Treatment Area: Left arm pain [M79.602] SUBJECTIVE Pain Level (0-10 scale): .5 Any medication changes, allergies to medications, adverse drug reactions, diagnosis change, or new procedure performed?: [x] No    [] Yes (see summary sheet for update) Subjective functional status/changes:   [] No changes reported Pt states he did really well after last DN session and felt no pain for 2-3 days but the discomfort slowly returned after that. OBJECTIVE Modality rationale: decrease edema, decrease inflammation and decrease pain to improve the patients ability to reach overhead Type Additional Details Estim: []Att   []Unatt    []TENS instruct []IFC  []Premod   []NMES []Other:  []w/US   []w/ice   []w/heat Position: Location:   
[]  Traction: [] Cervical       []Lumbar 
                     [] Prone          []Supine []Intermittent   []Continuous Lbs: 
[] before manual 
[] after manual 
[]w/heat  
[]  Ultrasound: []Continuous   [] Pulsed  
                    at: []1MHz   []3MHz Location: 
W/cm2:  
[] Paraffin Location:  
[]w/heat  
[]  Ice     [x]  Heat 
[]  Ice massage Position: supine Location: L shoulder- 10' []  Laser 
[]  Other: Position: Location:  
[]  Vasopneumatic Device Pressure:       [] lo [] med [] hi  
Temperature:   
 
[x] Skin assessment post-treatment:  [x]intact []redness- no adverse reaction 
  []redness  adverse reaction: OBJECTIVE 30 min Manual Therapy: DN as noted in separate note below; STM to upper trap, levator scap, infraspinatus,   
 Rationale:      decrease pain, increase ROM, increase tissue extensibility and decrease trigger points to improve patient's ability to perform ADL's. With MT  Patient Education:  YES  Reviewed HEP Other Objective Measures:TTP over L upper trap, levator scap, infraspinatus, teres minor/major Dry Needling Procedure Note Dry Needle Session Number:  2 Procedure: An intramuscular manual therapy (dry needling) and a neuro-muscular re-education treatment was done to deactivate myofascial trigger points, with a solid filament needle, under aseptic technique. Indication(s): [] Muscle spasms [] Headaches  [] TMJD  
   [] Muscle imbalances [x] Myofascial pain & dysfunction 
   [] Decreased ROM 
 
TIMEOUT PERFORMED:   
8:30 (enter time the timeout was completed) Keith Mcmahan (enter who was present) Informed Consent Obtained: [x] Verbal  [x] Written The following items were reviewed with the patient: 
-Purpose of dry needling, side effects, possible complications, and the informed consent  
-The need to report the use of blood thinners and/or immunosuppressant medications 
-How to respond to possible adverse effects of the treatment 
-Self treatment of post needling soreness: ice/heat, stretching, and activity modification.  
-Opportunity was given to ask any questions, all questions were answered Treatment: The following muscles were treated today: 
Madi: 
Right: 
Left: upper trap, levator scap, infraspinatus, teres minor/major 50 mm x .3 mm *hemostasis applied after each needle was applied. Soft tissue mobilization to above areas Patients response:  
[x]  LTRs  []  Muscle Relaxation  [x]  Pain Relief  
[]  Decreased HAs [x]  Post-needling soreness []  Increased ROM With 
 [x] TE 
 [] TA 
 [] neuro 
 [] other: Patient Education: [x] Review HEP [] Progressed/Changed HEP based on:  
[] positioning   [x] body mechanics   [] transfers   [] heat/ice application [] other:   
 
 
Pain Level (0-10 scale) post treatment: 0 
 
ASSESSMENT/Changes in Function:  
Pt demonstrates increased muscle turgor over L upper trap and levator scap. Pt feels relief after session. Patient will continue to benefit from skilled PT services to modify and progress therapeutic interventions, address functional mobility deficits, address ROM deficits, address strength deficits, analyze and address soft tissue restrictions, analyze and cue movement patterns and analyze and modify body mechanics/ergonomics to attain remaining goals. []  See Plan of Care 
[]  See progress note/recertification 
[]  See Discharge Summary Progress towards goals / Updated goals: 
Not assessed PLAN [x]  Upgrade activities as tolerated     [x]  Continue plan of care 
[]  Update interventions per flow sheet      
[]  Discharge due to:_ 
[]  Other: 
 
Frances Rosa, PT, DPT 4/11/2019

## 2019-04-18 ENCOUNTER — HOSPITAL ENCOUNTER (OUTPATIENT)
Dept: PHYSICAL THERAPY | Age: 49
Discharge: HOME | End: 2019-04-18
Payer: COMMERCIAL

## 2019-04-18 PROCEDURE — 97140 MANUAL THERAPY 1/> REGIONS: CPT | Performed by: PHYSICAL THERAPY ASSISTANT

## 2019-04-18 NOTE — PROGRESS NOTES
[facility] [address] [facility], [address] [place] [address] Phone: [phone]  Fax: [phone] Discharge Summary  2-15 Patient name: Romy Mitchell  : 1970  Provider#:[id number] Referral source: Nikki Sandoval MD     
Medical/Treatment Diagnosis: Left arm pain [M79.602] Prior Hospitalization: see medical history Comorbidities: see chart Prior Level of Function:working out Medications: Verified on Patient Summary List 
 
Start of Care: 19      Onset Date:few months ago Visits from Start of Care: 8     Missed Visits: 0 Reporting Period : 19 to 19 Short Term Goals: To be accomplished in 2 weeks: Pt will be independent w/ HEP MET Pt will report 50% decrease in pain at rest MET Pt will demonstrate proper sitting posture for 20 minutes MET Long Term Goals: To be accomplished in 6 weeks: Pt will demonstrate ability to sit at desk for 1 hour w/o increase in symptoms MET Pt will report over 15 point improvement on FOTO MET Pt will be able to drive for 30 minutes w/o increase in pain MET 
 
 
ASSESSMENT/SUMMARY OF CARE: Pt made very good strides w/ PT and now demonstrates no pain or radiation of pain, full shoulder ROM, 5/5 UE strength and full functional ability. Pt was given updated HEP and educated on contacting MD if questions arise. 
  
 
 
 
RECOMMENDATIONS: 
[x]Discontinue therapy: [x]Patient has reached or is progressing toward set goals []Patient is non-compliant or has abdicated 
    []Due to lack of appreciable progress towards set goals All [facility]za 2019

## 2019-04-18 NOTE — PROGRESS NOTES
PT DAILY TREATMENT NOTE - [facility] 2-15 Patient Name: Aydee Barahona Date:2019 : 1970 [x]  Patient  Verified Payor: BLUE CROSS / Plan: [facility] PPO / Product Type: PPO / In time: 12:55 pm  Out time: 1:45 pm 
Total Treatment Time (min): 50 Total Timed Codes (min): 30 
1:1 Treatment Time ( only): 30 Visit #:  2 Treatment Area: Left arm pain [M79.602] SUBJECTIVE Pain Level (0-10 scale): 0 Any medication changes, allergies to medications, adverse drug reactions, diagnosis change, or new procedure performed?: [x] No    [] Yes (see summary sheet for update) Subjective functional status/changes:   [] No changes reported Pt states he is 100% improved and experienced no pain over the past 4-5 days (even when waxing his son's car). OBJECTIVE Modality rationale: decrease edema, decrease inflammation and decrease pain to improve the patients ability to reach overhead Type Additional Details Estim: []Att   []Unatt    []TENS instruct []IFC  []Premod   []NMES []Other:  []w/US   []w/ice   []w/heat Position: Location:   
[]  Traction: [] Cervical       []Lumbar 
                     [] Prone          []Supine []Intermittent   []Continuous Lbs: 
[] before manual 
[] after manual 
[]w/heat  
[]  Ultrasound: []Continuous   [] Pulsed  
                    at: []1MHz   []3MHz Location: 
W/cm2:  
[] Paraffin Location:  
[]w/heat  
[]  Ice     [x]  Heat pre and post 
[]  Ice massage Position: supine Location: L shoulder- 10' []  Laser 
[]  Other: Position: Location:  
[]  Vasopneumatic Device Pressure:       [] lo [] med [] hi  
Temperature:   
 
[x] Skin assessment post-treatment:  [x]intact []redness- no adverse reaction 
  []redness  adverse reaction: OBJECTIVE 30 min Manual Therapy: DN as noted in separate note below; STM to upper trap, levator scap, infraspinatus,   
 Rationale:      decrease pain, increase ROM, increase tissue extensibility and decrease trigger points to improve patient's ability to perform ADL's. With MT  Patient Education:  YES  Reviewed HEP Other Objective Measures:TTP over L upper trap, levator scap, infraspinatus, teres minor/major Dry Needling Procedure Note Dry Needle Session Number:  3 Procedure: An intramuscular manual therapy (dry needling) and a neuro-muscular re-education treatment was done to deactivate myofascial trigger points, with a solid filament needle, under aseptic technique. Indication(s): [] Muscle spasms [] Headaches  [] TMJD  
   [] Muscle imbalances [x] Myofascial pain & dysfunction 
   [] Decreased ROM 
 
TIMEOUT PERFORMED:   
1:35 (enter time the timeout was completed) Tim Kirby (enter who was present) Informed Consent Obtained: [x] Verbal  [x] Written The following items were reviewed with the patient: 
-Purpose of dry needling, side effects, possible complications, and the informed consent  
-The need to report the use of blood thinners and/or immunosuppressant medications 
-How to respond to possible adverse effects of the treatment 
-Self treatment of post needling soreness: ice/heat, stretching, and activity modification.  
-Opportunity was given to ask any questions, all questions were answered Treatment: The following muscles were treated today: 
Madi: 
Right: 
Left: upper trap, levator scap, infraspinatus, teres minor/major 50 mm x .3 mm *hemostasis applied after each needle was applied. Soft tissue mobilization to above areas Patients response:  
[x]  LTRs  []  Muscle Relaxation  [x]  Pain Relief  
[]  Decreased HAs [x]  Post-needling soreness []  Increased ROM With 
 [x] TE 
 [] TA 
 [] neuro 
 [] other: Patient Education: [x] Review HEP [] Progressed/Changed HEP based on:  
[] positioning   [x] body mechanics   [] transfers   [] heat/ice application [] other:   
 
Objective: 
Shoulder ROM: full 5/5 UE strength Pain Level (0-10 scale) post treatment: 0 
 
ASSESSMENT/Changes in Function:  
Pt made very good strides w/ PT and now demonstrates no pain or radiation of pain, full shoulder ROM, 5/5 UE strength and full functional ability. Pt was given updated HEP and educated on contacting MD if questions arise. []  See Plan of Care 
[]  See progress note/recertification 
[x]  See Discharge Summary Progress towards goals / Updated goals: MET all goals PLAN 
[]  Upgrade activities as tolerated     []  Continue plan of care 
[]  Update interventions per flow sheet [x]  Discharge due to:MET goals 
[]  Other: 
 
Donita Barrios, PT, DPT 4/18/2019

## 2019-06-12 ENCOUNTER — OFFICE VISIT (OUTPATIENT)
Dept: FAMILY MEDICINE CLINIC | Age: 49
End: 2019-06-12

## 2019-06-12 VITALS
HEART RATE: 70 BPM | BODY MASS INDEX: 25.36 KG/M2 | SYSTOLIC BLOOD PRESSURE: 125 MMHG | OXYGEN SATURATION: 100 % | HEIGHT: 74 IN | DIASTOLIC BLOOD PRESSURE: 80 MMHG | WEIGHT: 197.6 LBS | RESPIRATION RATE: 18 BRPM | TEMPERATURE: 97.9 F

## 2019-06-12 DIAGNOSIS — E03.9 ACQUIRED HYPOTHYROIDISM: ICD-10-CM

## 2019-06-12 DIAGNOSIS — F98.8 ATTENTION DEFICIT DISORDER, UNSPECIFIED HYPERACTIVITY PRESENCE: Primary | ICD-10-CM

## 2019-06-12 RX ORDER — LEVOTHYROXINE SODIUM 137 UG/1
137 TABLET ORAL
Qty: 90 TAB | Refills: 1
Start: 2019-06-12

## 2019-06-12 NOTE — PATIENT INSTRUCTIONS
Attention Deficit Hyperactivity Disorder (ADHD) in Adults: Care Instructions  Your Care Instructions    Attention deficit hyperactivity disorder, or ADHD, is a condition that makes it hard to pay attention. So you may have problems when you try to focus, get organized, and finish tasks. It might make you more active than other people. Or you might do things without thinking first.  ADHD is very common. It usually starts in early childhood. Many adults don't realize they have it until their children are diagnosed. Then they become aware of their own symptoms. Doctors don't know what causes ADHD. But it often runs in families. ADHD can be treated with medicines, behavior training, and counseling. Treatment can improve your life. Follow-up care is a key part of your treatment and safety. Be sure to make and go to all appointments, and call your doctor if you are having problems. It's also a good idea to know your test results and keep a list of the medicines you take. How can you care for yourself at home? · Learn all you can about ADHD. This will help you and your family understand it better. · Take your medicines exactly as prescribed. Call your doctor if you think you are having a problem with your medicine. You will get more details on the specific medicines your doctor prescribes. · If you miss a dose of your medicine, do not take an extra dose. · If your doctor suggests counseling, find a counselor you like and trust. Talk openly and honestly. Be willing to make some changes. · Find a support group for adults with ADHD. Talking to others with the same problems can help you feel better. It can also give you ideas about how to best cope with the condition. · Get rid of distractions at your work space. Keep your desk clean. Try not to face a window or busy hallway. · Use files, planners, and other tools to keep you organized. · Limit use of alcohol, and do not use illegal drugs.  People with ADHD tend to become addicted more easily than others. Tell your doctor if you need help to quit. Counseling, support groups, and sometimes medicines can help you stay free of alcohol or drugs. · Get at least 30 minutes of physical activity on most days of the week. Exercise has been shown to help people cope with ADHD. Walking is a good choice. You also may want to do other activities, such as running, swimming, cycling, or playing tennis or team sports. When should you call for help? Watch closely for changes in your health, and be sure to contact your doctor if:    · You feel sad a lot or cry all the time.     · You have trouble sleeping, or you sleep too much.     · You find it hard to concentrate, make decisions, or remember things.     · You change how you normally eat.     · You feel guilty for no reason. Where can you learn more? Go to http://ja-darwin.info/. Enter B196 in the search box to learn more about \"Attention Deficit Hyperactivity Disorder (ADHD) in Adults: Care Instructions. \"  Current as of: September 11, 2018  Content Version: 11.9  © 0248-6711 [employer]. Care instructions adapted under license by [employer] (which disclaims liability or warranty for this information). If you have questions about a medical condition or this instruction, always ask your healthcare professional. [place] [address] any warranty or liability for your use of this information.

## 2019-06-12 NOTE — PROGRESS NOTES
HPI  Andreia Floyd 50 y.o. male  presents to the office today for follow up on ADD. Blood pressure 125/80, pulse 70, temperature 97.9 °F (36.6 °C), temperature source Oral, resp. rate 18, height 6' 2\" (1.88 m), weight 197 lb 9.6 oz (89.6 kg), SpO2 100 %. Body mass index is 25.37 kg/m². Chief Complaint   Patient presents with    Attention Deficit Disorder     follow up and medication refills       ADD: Pt is currently taking Vyvanse 30 mg/BID and inquires refills. I will provide pt with refills for the medication. Pt reports that he has been taking the medication, however pt admits that he is not compliant with taking the medication BID. Pt reports that he recently felt \"cranky\", but pt thinks that it is due to him not being compliant with his medication regimen. I discussed with pt the importance of being complaint with his medication regimen. Hypothyroidism: Pt's TSH level was 1.480 on 9/20/18. Pt states that upon his recent visit to endocrinology, he was advised to discontinue Unithroid 125 mcg and advised to start taking Unithroid 137 mcg/d instead. Pt will be continue taking the medication and he will follow up with endocrinology in about 6 months for evaluation. Current Outpatient Medications   Medication Sig Dispense Refill    [START ON 6/19/2019] lisdexamfetamine (VYVANSE) 30 mg capsule Take 1 Cap by mouth two (2) times a day. Max Daily Amount: 60 mg. 60 Cap 0    [START ON 7/19/2019] lisdexamfetamine (VYVANSE) 30 mg capsule Take 1 Cap by mouth two (2) times a day. Max Daily Amount: 60 mg. 60 Cap 0    [START ON 8/19/2019] lisdexamfetamine (VYVANSE) 30 mg capsule Take 1 Cap by mouth two (2) times a day. Max Daily Amount: 60 mg. 60 Cap 0    levothyroxine (UNITHROID) 137 mcg tablet Take 137 mcg by mouth Daily (before breakfast).  90 Tab 1    atorvastatin (LIPITOR) 10 mg tablet TAKE 1 TABLET BY MOUTH EVERY DAY 90 Tab 1    Cholecalciferol, Vitamin D3, (VITAMIN D3) 2,000 unit cap capsule Take by mouth daily. Allergies   Allergen Reactions    Soy Anaphylaxis     History reviewed. No pertinent past medical history. Past Surgical History:   Procedure Laterality Date    HX VASECTOMY       Family History   Problem Relation Age of Onset    High Cholesterol Father     Migraines Sister     Thyroid Disease Sister      Social History     Tobacco Use    Smoking status: Never Smoker    Smokeless tobacco: Never Used   Substance Use Topics    Alcohol use: Yes        Review of Systems   Constitutional: Negative for chills and fever. HENT: Negative for hearing loss and tinnitus. Eyes: Negative for blurred vision and double vision. Respiratory: Negative for cough and shortness of breath. Cardiovascular: Negative for chest pain and palpitations. Gastrointestinal: Negative for nausea and vomiting. Genitourinary: Negative for dysuria and frequency. Musculoskeletal: Negative for back pain and falls. Skin: Negative for itching and rash. Neurological: Negative for dizziness, loss of consciousness and headaches. Psychiatric/Behavioral: Negative for depression. The patient is not nervous/anxious. Physical Exam   Constitutional: He is oriented to person, place, and time. He appears well-developed and well-nourished. HENT:   Head: Normocephalic and atraumatic. Right Ear: External ear normal.   Left Ear: External ear normal.   Nose: Nose normal.   Mouth/Throat: Oropharynx is clear and moist.   Eyes: Conjunctivae and EOM are normal.   Neck: Normal range of motion. Neck supple. Cardiovascular: Normal rate, regular rhythm, normal heart sounds and intact distal pulses. Pulmonary/Chest: Effort normal and breath sounds normal.   Abdominal: Soft. Bowel sounds are normal.   Musculoskeletal: Normal range of motion. Neurological: He is alert and oriented to person, place, and time. Skin: Skin is warm and dry. Psychiatric: He has a normal mood and affect.  His behavior is normal. Judgment and thought content normal.   Nursing note and vitals reviewed. ASSESSMENT and PLAN  Diagnoses and all orders for this visit:    1. Attention deficit disorder, unspecified hyperactivity presence  Advised pt to continue with Vyvanse 30 mg/BID. Provided pt with refills for Vyvanse 30 mg. Advised pt to stay compliant with his medication regimen. -     lisdexamfetamine (VYVANSE) 30 mg capsule; Take 1 Cap by mouth two (2) times a day. Max Daily Amount: 60 mg.  -     lisdexamfetamine (VYVANSE) 30 mg capsule; Take 1 Cap by mouth two (2) times a day. Max Daily Amount: 60 mg.  -     lisdexamfetamine (VYVANSE) 30 mg capsule; Take 1 Cap by mouth two (2) times a day. Max Daily Amount: 60 mg.    2. Acquired hypothyroidism  Advised pt to continue with Unithroid 137 mcg/d and continue follow up with endocrinology. -     levothyroxine (UNITHROID) 137 mcg tablet; Take 137 mcg by mouth Daily (before breakfast). Follow-up and Dispositions    · Return in about 3 months (around 9/12/2019) for ADD. Medication risks/benefits/costs/interactions/alternatives discussed with patient. Advised patient to call back or return to office if symptoms worsen/change/persist.  If patient cannot reach us or should anything more severe/urgent arise he/she should proceed directly to the nearest emergency department. Discussed expected course/resolution/complications of diagnosis in detail with patient. Patient given a written after visit summary which includes her diagnoses, current medications and vitals. Patient expressed understanding with the diagnosis and plan. Written by meek Perez, as dictated by Macrina Radford M.D.    12:26 PM - 12:35 PM    Total time spent with the patient 9 minutes, greater than 50% of time spent counseling patient.

## 2019-06-12 NOTE — PROGRESS NOTES
Chief Complaint   Patient presents with    Attention Deficit Disorder     follow up and medication refills        1. Have you been to the ER, urgent care clinic since your last visit? Hospitalized since your last visit? No    2. Have you seen or consulted any other health care providers outside of the [address] [place] [address] since your last visit? Include any pap smears or colon screening.  Patient saw Dr. Barrington New  In April

## 2019-09-07 DIAGNOSIS — E78.2 MIXED HYPERLIPIDEMIA: ICD-10-CM

## 2019-09-09 RX ORDER — ATORVASTATIN CALCIUM 10 MG/1
TABLET, FILM COATED ORAL
Qty: 90 TAB | Refills: 1 | Status: SHIPPED | OUTPATIENT
Start: 2019-09-09 | End: 2020-03-10 | Stop reason: SDUPTHER

## 2019-09-11 ENCOUNTER — OFFICE VISIT (OUTPATIENT)
Dept: FAMILY MEDICINE CLINIC | Age: 49
End: 2019-09-11

## 2019-09-11 VITALS
BODY MASS INDEX: 25.41 KG/M2 | HEART RATE: 75 BPM | DIASTOLIC BLOOD PRESSURE: 88 MMHG | SYSTOLIC BLOOD PRESSURE: 124 MMHG | HEIGHT: 74 IN | WEIGHT: 198 LBS | RESPIRATION RATE: 20 BRPM | TEMPERATURE: 96.9 F | OXYGEN SATURATION: 99 %

## 2019-09-11 DIAGNOSIS — F98.8 ATTENTION DEFICIT DISORDER, UNSPECIFIED HYPERACTIVITY PRESENCE: ICD-10-CM

## 2019-09-11 DIAGNOSIS — Z79.899 MEDICATION MANAGEMENT: Primary | ICD-10-CM

## 2019-09-11 DIAGNOSIS — Z23 ENCOUNTER FOR IMMUNIZATION: ICD-10-CM

## 2019-09-11 NOTE — PROGRESS NOTES
History of Present Illness    Visit Vitals  /88   Pulse 75   Temp 96.9 °F (36.1 °C) (Oral)   Resp 20   Ht 6' 2\" (1.88 m)   Wt 198 lb (89.8 kg)   SpO2 99%   BMI 25.42 kg/m²       Angella Hardin is a 52 y.o. male who presents to the office today for follow up of routine medical issues. ADD: Pt reports that he feels that vyvanse 30mg BID is working relatively well, but feels that it is not as effective as it was when he first started taking it. He tries not to take his second dose after 1400 as it causes him to have difficulty sleeping, so sometimes only takes 30mg in the morning. Hypothyroidism: Pt reports that his levothyroxine dose was increased by his endocrinologist recently. Elevated BP:  BP was initially elevated at 144/92 and normal at 128/84 on manual recheck. /88   Pulse 75   Temp 96.9 °F (36.1 °C) (Oral)   Resp 20   Ht 6' 2\" (1.88 m)   Wt 198 lb (89.8 kg)   SpO2 99%   BMI 25.42 kg/m²     Current Outpatient Medications   Medication Sig Dispense Refill    lisdexamfetamine (VYVANSE) 30 mg capsule Take 1 Cap by mouth two (2) times a day for 30 days. Max Daily Amount: 60 mg. 60 Cap 0    [START ON 10/11/2019] lisdexamfetamine (VYVANSE) 30 mg capsule Take 1 Cap by mouth two (2) times a day for 30 days. Max Daily Amount: 60 mg. 60 Cap 0    [START ON 11/10/2019] lisdexamfetamine (VYVANSE) 30 mg capsule Take 1 Cap by mouth two (2) times a day for 30 days. Max Daily Amount: 60 mg. 60 Cap 0    atorvastatin (LIPITOR) 10 mg tablet TAKE 1 TABLET BY MOUTH EVERY DAY 90 Tab 1    levothyroxine (UNITHROID) 137 mcg tablet Take 137 mcg by mouth Daily (before breakfast). 90 Tab 1    Cholecalciferol, Vitamin D3, (VITAMIN D3) 2,000 unit cap capsule Take  by mouth daily. Allergies   Allergen Reactions    Soy Anaphylaxis     No past medical history on file.   Past Surgical History:   Procedure Laterality Date    HX VASECTOMY       Family History   Problem Relation Age of Onset    High Cholesterol Father     Migraines Sister     Thyroid Disease Sister      Social History     Tobacco Use    Smoking status: Never Smoker    Smokeless tobacco: Never Used   Substance Use Topics    Alcohol use: Yes        Review of Systems   Constitutional: Negative for chills and fever. HENT: Negative for hearing loss and tinnitus. Eyes: Negative for blurred vision and double vision. Respiratory: Negative for shortness of breath. Cardiovascular: Negative for chest pain and palpitations. Gastrointestinal: Negative for nausea and vomiting. Genitourinary: Negative for dysuria and frequency. Musculoskeletal: Negative for back pain and falls. Skin: Negative for itching and rash. Neurological: Negative for dizziness and headaches. Psychiatric/Behavioral: Negative for depression. The patient is not nervous/anxious. +ADD       Physical Exam   Constitutional: He is oriented to person, place, and time and well-developed, well-nourished, and in no distress. HENT:   Head: Normocephalic and atraumatic. Right Ear: External ear normal.   Left Ear: External ear normal.   Nose: Nose normal.   Mouth/Throat: Oropharynx is clear and moist.   Eyes: Conjunctivae and EOM are normal.   Neck: Normal range of motion. Neck supple. Cardiovascular: Normal rate, regular rhythm, normal heart sounds and intact distal pulses. Pulmonary/Chest: Effort normal and breath sounds normal.   Abdominal: Soft. Bowel sounds are normal.   Musculoskeletal: Normal range of motion. Neurological: He is alert and oriented to person, place, and time. Skin: Skin is warm and dry. Psychiatric: Mood, affect and judgment normal.   Nursing note and vitals reviewed. Diagnoses and all orders for this visit:    1. Medication management    2. Encounter for immunization  -     INFLUENZA VIRUS VAC QUAD,SPLIT,PRESV FREE SYRINGE IM  -     MT IMMUNIZ ADMIN,1 SINGLE/COMB VAC/TOXOID    3.  Attention deficit disorder, unspecified hyperactivity presence  -     lisdexamfetamine (VYVANSE) 30 mg capsule; Take 1 Cap by mouth two (2) times a day for 30 days. Max Daily Amount: 60 mg.  -     lisdexamfetamine (VYVANSE) 30 mg capsule; Take 1 Cap by mouth two (2) times a day for 30 days. Max Daily Amount: 60 mg.  -     lisdexamfetamine (VYVANSE) 30 mg capsule; Take 1 Cap by mouth two (2) times a day for 30 days. Max Daily Amount: 60 mg.    1. Medication Management  Performed today. 2. Encounter for immunization  Pt received influenza vaccine today in the office. 3. ADD  Continue vyvanse 30mg BID. Advised pt that he may also try naturopathic supplements, but that he should not DC vyvanse. Medication risks/benefits/costs/interactions/alternatives discussed with patient. Advised patient to call back or return to office if symptoms worsen/change/persist.  If patient cannot reach us or should anything more severe/urgent arise he should proceed directly to the nearest emergency department. Discussed expected course/resolution/complications of diagnosis in detail with patient. Patient given a written after visit summary which includes his diagnoses, current medications and vitals. Patient expressed understanding with the diagnosis and plan. Written by Korey Vargas, as dictated by Phoenix Kothari M.D.     12:32 PM - 12:47 PM     Total time spent with the patient was 15 minutes, greater than 50% of time spent counseling patient.

## 2019-09-11 NOTE — PATIENT INSTRUCTIONS
Attention Deficit Hyperactivity Disorder (ADHD) in Adults: Care Instructions  Your Care Instructions    Attention deficit hyperactivity disorder, or ADHD, is a condition that makes it hard to pay attention. So you may have problems when you try to focus, get organized, and finish tasks. It might make you more active than other people. Or you might do things without thinking first.  ADHD is very common. It usually starts in early childhood. Many adults don't realize they have it until their children are diagnosed. Then they become aware of their own symptoms. Doctors don't know what causes ADHD. But it often runs in families. ADHD can be treated with medicines, behavior training, and counseling. Treatment can improve your life. Follow-up care is a key part of your treatment and safety. Be sure to make and go to all appointments, and call your doctor if you are having problems. It's also a good idea to know your test results and keep a list of the medicines you take. How can you care for yourself at home? · Learn all you can about ADHD. This will help you and your family understand it better. · Take your medicines exactly as prescribed. Call your doctor if you think you are having a problem with your medicine. You will get more details on the specific medicines your doctor prescribes. · If you miss a dose of your medicine, do not take an extra dose. · If your doctor suggests counseling, find a counselor you like and trust. Talk openly and honestly. Be willing to make some changes. · Find a support group for adults with ADHD. Talking to others with the same problems can help you feel better. It can also give you ideas about how to best cope with the condition. · Get rid of distractions at your work space. Keep your desk clean. Try not to face a window or busy hallway. · Use files, planners, and other tools to keep you organized. · Limit use of alcohol, and do not use illegal drugs.  People with ADHD tend to develop substance use disorder more easily than others. Tell your doctor if you need help to quit. Counseling, support groups, and sometimes medicines can help you stay free of alcohol or drugs. · Get at least 30 minutes of physical activity on most days of the week. Exercise has been shown to help people cope with ADHD. Walking is a good choice. You also may want to do other activities, such as running, swimming, cycling, or playing tennis or team sports. When should you call for help? Watch closely for changes in your health, and be sure to contact your doctor if:    · You feel sad a lot or cry all the time.     · You have trouble sleeping, or you sleep too much.     · You find it hard to concentrate, make decisions, or remember things.     · You change how you normally eat.     · You feel guilty for no reason. Where can you learn more? Go to http://ja-darwin.info/. Enter B196 in the search box to learn more about \"Attention Deficit Hyperactivity Disorder (ADHD) in Adults: Care Instructions. \"  Current as of: September 11, 2018  Content Version: 12.1  © 2464-9182 [employer]. Care instructions adapted under license by [employer] (which disclaims liability or warranty for this information). If you have questions about a medical condition or this instruction, always ask your healthcare professional. [address] any warranty or liability for your use of this information.

## 2019-09-11 NOTE — PROGRESS NOTES
Chief Complaint   Patient presents with    Behavioral Problem     3 months follow up visit for Add    Medication Refill     vyvanse     Spoke to patient Identified pt with two pt identifiers (Name @ )    Wants Flu shot  Patient denies egg allergy, never had reaction to flu injection in past, not allergic thimerosol mercury based component, denies pregnancy, never had Guillain Barr's syndrome , denies fever. Risks and adverse reactions were discussed and the VIS was given to patient. All questions were addressed. Patient was observed for 5 min post injection. There were no reactions observed. Signed form and scan       1. Have you been to the ER, urgent care clinic since your last visit? Hospitalized since your last visit? No    2. Have you seen or consulted any other health care providers outside of the [address] [place] [address] since your last visit? Include any pap smears or colon screening.  No     \"REVIEWED RECORD IN PREPARATION FOR VISIT AND HAVE OBTAINED NECESSARY DOCUMENTATION\"

## 2019-09-23 ENCOUNTER — OFFICE VISIT (OUTPATIENT)
Dept: FAMILY MEDICINE CLINIC | Age: 49
End: 2019-09-23

## 2019-09-23 VITALS
TEMPERATURE: 98 F | RESPIRATION RATE: 16 BRPM | DIASTOLIC BLOOD PRESSURE: 75 MMHG | HEART RATE: 78 BPM | WEIGHT: 196 LBS | SYSTOLIC BLOOD PRESSURE: 117 MMHG | HEIGHT: 74 IN | BODY MASS INDEX: 25.15 KG/M2 | OXYGEN SATURATION: 99 %
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
## 2019-09-23 RX ORDER — DOXYCYCLINE 100 MG/1
100 TABLET ORAL 2 TIMES DAILY
Qty: 30 TAB | Refills: 0 | Status: SHIPPED | OUTPATIENT
Start: 2019-09-23 | End: 2019-10-08

## 2019-09-23 NOTE — PROGRESS NOTES
Chief Complaint   Patient presents with   Elyssa Duarte 83     bit by tick x 1 week ago, wants to be checked for lymes       Reviewed Record in preparation for visit and have obtained necessary documentation. Identified pt with two pt identifiers (Name @ )    There are no preventive care reminders to display for this patient. 1. Have you been to the ER, urgent care clinic since your last visit? Hospitalized since your last visit? no    2. Have you seen or consulted any other health care providers outside of the [address] [place] [address] since your last visit? Include any pap smears or colon screening.  no

## 2019-09-23 NOTE — PROGRESS NOTES
HPI  Mely Garcia 52 y.o. male  presents to the office today c/o of insect bite. Blood pressure 117/75, pulse 78, temperature 98 °F (36.7 °C), temperature source Oral, resp. rate 16, height 6' 2\" (1.88 m), weight 196 lb (88.9 kg), SpO2 99 %. Body mass index is 25.16 kg/m². Chief Complaint   Patient presents with    Insect Bite     bit by tick x 1 week ago, wants to be checked for lymes        Insect bite: Pt states that he was outside last week mowing grass in the morning, and he noticed the insect bite that afternoon. Pt admits having general cold sx's such as swollen tonsil and felt like he had fever, but he checked his temperature and it was normal; denies getting bullseye; denies muscle pain. Pt expresses concern about getting lymes disease and wants to be tested for it; pt admits having lymes disease in the past.      Current Outpatient Medications   Medication Sig Dispense Refill    doxycycline (ADOXA) 100 mg tablet Take 1 Tab by mouth two (2) times a day for 15 days. 30 Tab 0    lisdexamfetamine (VYVANSE) 30 mg capsule Take 1 Cap by mouth two (2) times a day for 30 days. Max Daily Amount: 60 mg. 60 Cap 0    atorvastatin (LIPITOR) 10 mg tablet TAKE 1 TABLET BY MOUTH EVERY DAY 90 Tab 1    levothyroxine (UNITHROID) 137 mcg tablet Take 137 mcg by mouth Daily (before breakfast). 90 Tab 1    Cholecalciferol, Vitamin D3, (VITAMIN D3) 2,000 unit cap capsule Take  by mouth daily.  [START ON 10/11/2019] lisdexamfetamine (VYVANSE) 30 mg capsule Take 1 Cap by mouth two (2) times a day for 30 days. Max Daily Amount: 60 mg. 60 Cap 0    [START ON 11/10/2019] lisdexamfetamine (VYVANSE) 30 mg capsule Take 1 Cap by mouth two (2) times a day for 30 days. Max Daily Amount: 60 mg. 60 Cap 0     Allergies   Allergen Reactions    Soy Anaphylaxis     History reviewed. No pertinent past medical history.   Past Surgical History:   Procedure Laterality Date    HX VASECTOMY       Family History   Problem Relation Age of Onset    High Cholesterol Father     Migraines Sister     Thyroid Disease Sister      Social History     Tobacco Use    Smoking status: Never Smoker    Smokeless tobacco: Never Used   Substance Use Topics    Alcohol use: Yes        Review of Systems   Constitutional: Negative for chills and fever. HENT: Negative for hearing loss and tinnitus. Eyes: Negative for blurred vision and double vision. Respiratory: Negative for cough and shortness of breath. Cardiovascular: Negative for chest pain and palpitations. Gastrointestinal: Negative for nausea and vomiting. Genitourinary: Negative for dysuria and frequency. Musculoskeletal: Negative for back pain and falls. Skin: Negative for itching. Insect bite   Neurological: Negative for dizziness, loss of consciousness and headaches. Psychiatric/Behavioral: Negative for depression. The patient is not nervous/anxious. Physical Exam   Constitutional: He is oriented to person, place, and time. He appears well-developed and well-nourished. HENT:   Head: Normocephalic and atraumatic. Right Ear: External ear normal.   Left Ear: External ear normal.   Nose: Nose normal.   Mouth/Throat: Oropharynx is clear and moist.   Eyes: Conjunctivae and EOM are normal.   Neck: Normal range of motion. Neck supple. Cardiovascular: Normal rate, regular rhythm, normal heart sounds and intact distal pulses. Pulmonary/Chest: Effort normal and breath sounds normal.   Abdominal: Soft. Bowel sounds are normal.   Musculoskeletal: Normal range of motion. Neurological: He is alert and oriented to person, place, and time. Skin: Skin is warm and dry. Insect bite on right anterior aspect of right thing, 4 mm, erythematous    Psychiatric: He has a normal mood and affect. His behavior is normal. Judgment and thought content normal.   Nursing note and vitals reviewed. ASSESSMENT and PLAN  Diagnoses and all orders for this visit:    1.  Tick bite, initial encounter  Will proceed with lab to test for lymes disease. Provided pt with prescription for Adoxa 100 mg/BID for 15 days. Advised pt to take the medication with meals. If sx's do not improve or worsen, pt should return to office.    -     LYME AB, IGG & IGM BY WB  -     doxycycline (ADOXA) 100 mg tablet; Take 1 Tab by mouth two (2) times a day for 15 days. Follow-up and Dispositions    · Return if symptoms worsen or fail to improve, for tick bite. Medication risks/benefits/costs/interactions/alternatives discussed with patient. Advised patient to call back or return to office if symptoms worsen/change/persist.  If patient cannot reach us or should anything more severe/urgent arise he/she should proceed directly to the nearest emergency department. Discussed expected course/resolution/complications of diagnosis in detail with patient. Patient given a written after visit summary which includes her diagnoses, current medications and vitals. Patient expressed understanding with the diagnosis and plan. Written by meek Randle, as dictated by Yuri Arias M.D.    7:42 AM - 7:54 AM    Total time spent with the patient 12 minutes, greater than 50% of time spent counseling patient.

## 2019-09-23 NOTE — PATIENT INSTRUCTIONS
Tick Bite: Care Instructions  Your Care Instructions    Ticks are small spiderlike animals. They bite to fasten themselves onto your skin and feed on your blood. Ticks can carry diseases. But most ticks do not carry diseases, and most tick bites do not cause serious health problems. Some people may have an allergic reaction to a tick bite. This reaction may be mild, with symptoms like itching and swelling. In rare cases, a severe allergic reaction may occur. Most of the time, all you need to do for a tick bite is relieve any symptoms you may have. Follow-up care is a key part of your treatment and safety. Be sure to make and go to all appointments, and call your doctor if you are having problems. It's also a good idea to know your test results and keep a list of the medicines you take. How can you care for yourself at home? · Put ice or a cold pack on the bite for 15 to 20 minutes once an hour. Put a thin cloth between the ice and your skin. · Try an over-the-counter medicine to relieve itching, redness, swelling, and pain. Be safe with medicines. Read and follow all instructions on the label. ? Take an antihistamine medicine, such as a nondrowsy one like loratadine (Claritin) or one that might make you sleepy like diphenhydramine (Benadryl). These medicines may help relieve itching, redness, and swelling. ? Use a spray of local anesthetic that contains benzocaine, such as Solarcaine. It may help relieve pain. If your skin reacts to the spray, stop using it. ? Put calamine lotion on the skin. It may help relieve itching. To avoid tick bites  · Avoid ticks:  ? Learn where ticks are found in your community, and stay away from those areas if possible. ? Cover as much of your body as possible when you work or play in grassy or wooded areas. ? Use insect repellents, such as products containing DEET. You can spray them on your skin.   ? Take steps to control ticks on your property if you live in an area where Lyme disease occurs. Clear leaves, brush, tall grasses, woodpiles, and stone fences from around your house and the edges of your yard or garden. This may help get rid of ticks. · When you come in from outdoors, check your body for ticks, including your groin, head, and underarms. The ticks may be about the size of a sesame seed. If no one else can help you check for ticks on your scalp, comb your hair with a fine-tooth comb. · If you find a tick, remove it quickly. Use tweezers to grasp the tick as close to its mouth (the part in your skin) as possible. Slowly pull the tick straight out--do not twist or yank--until its mouth releases from your skin. If part of the tick stays in the skin, leave it alone. It will likely come out on its own in a few days. · Ticks can come into your house on clothing, outdoor gear, and pets. These ticks can fall off and attach to you. ? Check your clothing and outdoor gear. Remove any ticks you find. Then put your clothing in a clothes dryer on high heat for 1 hour to kill any ticks that might remain. ? Check your pets for ticks after they have been outdoors. When should you call for help? Call 911 anytime you think you may need emergency care. For example, call if:    · You have symptoms of a severe allergic reaction. These may include:  ? Sudden raised, red areas (hives) all over your body. ? Swelling of the throat, mouth, lips, or tongue. ? Trouble breathing. ? Passing out (losing consciousness). Or you may feel very lightheaded or suddenly feel weak, confused, or restless.    Call your doctor now or seek immediate medical care if:    · You have signs of infection, such as:  ? Increased pain, swelling, warmth, or redness around the bite. ? Red streaks leading from the bite. ? Pus draining from the bite.   ? A fever.    Watch closely for changes in your health, and be sure to contact your doctor if:    · You develop a new rash.     · You have joint pain.     · You are very tired.     · You have flu-like symptoms.     · You have symptoms for more than 1 week. Where can you learn more? Go to http://ja-darwin.info/. Enter U900 in the search box to learn more about \"Tick Bite: Care Instructions. \"  Current as of: June 26, 2019  Content Version: 12.2  © 4057-6368 [employer]. Care instructions adapted under license by [employer] (which disclaims liability or warranty for this information). If you have questions about a medical condition or this instruction, always ask your healthcare professional. [place] [address] any warranty or liability for your use of this information.

## 2019-09-25 LAB
B BURGDOR IGG PATRN SER IB-IMP: NEGATIVE
B BURGDOR IGM PATRN SER IB-IMP: NEGATIVE
B BURGDOR18KD IGG SER QL IB: PRESENT
B BURGDOR23KD IGG SER QL IB: ABNORMAL
B BURGDOR23KD IGM SER QL IB: ABNORMAL
B BURGDOR28KD IGG SER QL IB: ABNORMAL
B BURGDOR30KD IGG SER QL IB: ABNORMAL
B BURGDOR39KD IGG SER QL IB: ABNORMAL
B BURGDOR39KD IGM SER QL IB: ABNORMAL
B BURGDOR41KD IGG SER QL IB: ABNORMAL
B BURGDOR41KD IGM SER QL IB: ABNORMAL
B BURGDOR45KD IGG SER QL IB: ABNORMAL
B BURGDOR58KD IGG SER QL IB: ABNORMAL
B BURGDOR66KD IGG SER QL IB: ABNORMAL
B BURGDOR93KD IGG SER QL IB: ABNORMAL

## 2019-09-26 NOTE — PROGRESS NOTES
Inform pt to go to my chart to see results and recommendations    Labs have come back negative for lyme, but I would advice you complete the antibiotic regimen

## 2019-11-17 ENCOUNTER — OFFICE VISIT (OUTPATIENT)
Dept: URGENT CARE | Age: 49
End: 2019-11-17

## 2019-11-17 VITALS
TEMPERATURE: 98.6 F | RESPIRATION RATE: 16 BRPM | OXYGEN SATURATION: 97 % | BODY MASS INDEX: 25.15 KG/M2 | SYSTOLIC BLOOD PRESSURE: 114 MMHG | HEART RATE: 86 BPM | HEIGHT: 74 IN | DIASTOLIC BLOOD PRESSURE: 59 MMHG | WEIGHT: 196 LBS

## 2019-11-17 DIAGNOSIS — H69.82 EUSTACHIAN TUBE DYSFUNCTION, LEFT: Primary | ICD-10-CM

## 2019-11-17 NOTE — PROGRESS NOTES
The history is provided by the patient. Ear Fullness   This is a new problem. Episode onset: 3 days ago. The problem occurs hourly. The problem has not changed since onset. Pertinent negatives include no shortness of breath. Nothing aggravates the symptoms. Nothing relieves the symptoms. Treatments tried: claritin. The treatment provided mild relief. With assoc left ear decreased hearing. No real ear pain, drainage, fever, chills, myalgias. Has had some sinus congestion. Took one antihistamine one time which helped. History reviewed. No pertinent past medical history. Past Surgical History:   Procedure Laterality Date    HX VASECTOMY           Family History   Problem Relation Age of Onset    High Cholesterol Father     Migraines Sister     Thyroid Disease Sister         Social History     Socioeconomic History    Marital status:      Spouse name: Not on file    Number of children: Not on file    Years of education: Not on file    Highest education level: Not on file   Occupational History    Not on file   Social Needs    Financial resource strain: Not on file    Food insecurity:     Worry: Not on file     Inability: Not on file    Transportation needs:     Medical: Not on file     Non-medical: Not on file   Tobacco Use    Smoking status: Never Smoker    Smokeless tobacco: Never Used   Substance and Sexual Activity    Alcohol use:  Yes    Drug use: No    Sexual activity: Yes     Partners: Female     Birth control/protection: Surgical   Lifestyle    Physical activity:     Days per week: Not on file     Minutes per session: Not on file    Stress: Not on file   Relationships    Social connections:     Talks on phone: Not on file     Gets together: Not on file     Attends Hindu service: Not on file     Active member of club or organization: Not on file     Attends meetings of clubs or organizations: Not on file     Relationship status: Not on file    Intimate partner violence:     Fear of current or ex partner: Not on file     Emotionally abused: Not on file     Physically abused: Not on file     Forced sexual activity: Not on file   Other Topics Concern    Not on file   Social History Narrative    Not on file                ALLERGIES: Soy    Review of Systems   Constitutional: Negative for chills, fatigue and fever. HENT: Positive for congestion, hearing loss and sinus pressure. Negative for ear discharge, ear pain, facial swelling, postnasal drip, rhinorrhea, sinus pain, sneezing and sore throat. Respiratory: Negative for cough and shortness of breath. Vitals:    11/17/19 1530   BP: 114/59   Pulse: 86   Resp: 16   Temp: 98.6 °F (37 °C)   SpO2: 97%   Weight: 196 lb (88.9 kg)   Height: 6' 2\" (1.88 m)       Physical Exam   Constitutional: He appears well-developed and well-nourished. No distress. HENT:   Head: Normocephalic and atraumatic. Right Ear: Tympanic membrane, external ear and ear canal normal.   Left Ear: External ear and ear canal normal. No drainage or tenderness. No mastoid tenderness. Tympanic membrane is not injected and not bulging. Tympanic membrane mobility is abnormal.  No middle ear effusion. Nose: Nose normal. Right sinus exhibits no maxillary sinus tenderness and no frontal sinus tenderness. Left sinus exhibits no maxillary sinus tenderness and no frontal sinus tenderness. Mouth/Throat: Oropharynx is clear and moist. No oropharyngeal exudate. Skin: He is not diaphoretic. MDM     Differential Diagnosis; Clinical Impression; Plan:     No evidence of otitis media. More likely has sinus congestion leading to eustachian tube dysfxn. Pt advised afrin x 3 days and mucinex-D.       Procedures

## 2019-11-17 NOTE — PATIENT INSTRUCTIONS
Eustachian Tube Problems: Care Instructions  Your Care Instructions    The eustachian (say \"you-STAY-shee-un\") tubes run between the inside of the ears and the throat. They keep air pressure stable in the ears. If your eustachian tubes become blocked, the air pressure in your ears changes. The fluids from a cold can clog eustachian tubes, causing pain in the ears. A quick change in air pressure can cause eustachian tubes to close up. This might happen when an airplane changes altitude or when a  goes up or down underwater. Eustachian tube problems often clear up on their own or after antibiotic treatment. If your tubes continue to be blocked, you may need surgery. Follow-up care is a key part of your treatment and safety. Be sure to make and go to all appointments, and call your doctor if you are having problems. It's also a good idea to know your test results and keep a list of the medicines you take. How can you care for yourself at home? · To ease ear pain, apply a warm washcloth or a heating pad set on low. There may be some drainage from the ear when the heat melts earwax. Put a cloth between the heat source and your skin. Do not use a heating pad with children. · If your doctor prescribed antibiotics, take them as directed. Do not stop taking them just because you feel better. You need to take the full course of antibiotics. · Your doctor may recommend over-the-counter medicine. Be safe with medicines. Oral or nasal decongestants may relieve ear pain. Avoid decongestants that are combined with antihistamines, which tend to cause more blockage. But if allergies seem to be the problem, your doctor may recommend a combination. Be careful with cough and cold medicines. Don't give them to children younger than 6, because they don't work for children that age and can even be harmful. For children 6 and older, always follow all the instructions carefully.  Make sure you know how much medicine to give and how long to use it. And use the dosing device if one is included. When should you call for help? Call your doctor now or seek immediate medical care if:    · You develop sudden, complete hearing loss.     · You have severe pain or feel dizzy.     · You have new or increasing pus or blood draining from your ear.     · You have redness, swelling, or pain around or behind the ear.    Watch closely for changes in your health, and be sure to contact your doctor if:    · You do not get better after 2 weeks.     · You have any new symptoms, such as itching or a feeling of fullness in the ear. Where can you learn more? Go to http://ja-darwin.info/. Enter Y822 in the search box to learn more about \"Eustachian Tube Problems: Care Instructions. \"  Current as of: October 21, 2018  Content Version: 12.2  © 8557-9974 [employer]. Care instructions adapted under license by [employer] (which disclaims liability or warranty for this information). If you have questions about a medical condition or this instruction, always ask your healthcare professional. [address] any warranty or liability for your use of this information.

## 2019-12-09 ENCOUNTER — OFFICE VISIT (OUTPATIENT)
Dept: FAMILY MEDICINE CLINIC | Age: 49
End: 2019-12-09

## 2019-12-09 VITALS
HEART RATE: 69 BPM | OXYGEN SATURATION: 98 % | SYSTOLIC BLOOD PRESSURE: 106 MMHG | BODY MASS INDEX: 25.67 KG/M2 | WEIGHT: 200 LBS | TEMPERATURE: 98.2 F | RESPIRATION RATE: 18 BRPM | DIASTOLIC BLOOD PRESSURE: 77 MMHG | HEIGHT: 74 IN

## 2019-12-09 DIAGNOSIS — E03.9 ACQUIRED HYPOTHYROIDISM: ICD-10-CM

## 2019-12-09 DIAGNOSIS — Z00.00 PHYSICAL EXAM: Primary | ICD-10-CM

## 2019-12-09 DIAGNOSIS — E78.2 MIXED HYPERLIPIDEMIA: ICD-10-CM

## 2019-12-09 DIAGNOSIS — F98.8 ATTENTION DEFICIT DISORDER, UNSPECIFIED HYPERACTIVITY PRESENCE: ICD-10-CM
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
## 2019-12-09 DIAGNOSIS — Z00.00 LABORATORY TESTS ORDERED AS PART OF A COMPLETE PHYSICAL EXAM (CPE): ICD-10-CM

## 2019-12-09 DIAGNOSIS — H93.13 TINNITUS OF BOTH EARS: ICD-10-CM

## 2019-12-09 DIAGNOSIS — M72.2 PLANTAR FASCIITIS: ICD-10-CM

## 2019-12-09 NOTE — PROGRESS NOTES
HPI  Gunnar Agent 52 y.o. male  presents to the office today for CPE. Blood pressure 106/77, pulse 69, temperature 98.2 °F (36.8 °C), temperature source Oral, resp. rate 18, height 6' 2\" (1.88 m), weight 200 lb (90.7 kg), SpO2 98 %. Body mass index is 25.68 kg/m². Chief Complaint   Patient presents with    Complete Physical     not fasting today    Attention Deficit Disorder        Hyperlipidemia: Lipid panel on 9/20/18 notable for total cholesterol 152, HDL 46, LDL 92, and triglycerides 73. Pt continues with Atorvastatin 10 mg/d. Hypothyroidism: Pt's TSH level was 1.480 on 9/20/18. Pt continues with Unithroid 137 mcg/d. ADD: Pt states that he is overall doing well and is tolerating Vyvanse 30 mg/BID. Pt still has bilateral tinnitus which has neither improved nor worsened. Pt reported tick bite at last visit in Sep. Will proceed with more lab today. Current Outpatient Medications   Medication Sig Dispense Refill    [START ON 2/9/2020] lisdexamfetamine (VYVANSE) 30 mg capsule Take 1 Cap by mouth two (2) times a day for 30 days. Max Daily Amount: 60 mg. 60 Cap 0    [START ON 1/9/2020] lisdexamfetamine (VYVANSE) 30 mg capsule Take 1 Cap by mouth two (2) times a day. Max Daily Amount: 60 mg. 60 Cap 0    lisdexamfetamine (VYVANSE) 30 mg capsule Take 1 Cap by mouth two (2) times a day. Max Daily Amount: 60 mg. 60 Cap 0    atorvastatin (LIPITOR) 10 mg tablet TAKE 1 TABLET BY MOUTH EVERY DAY 90 Tab 1    levothyroxine (UNITHROID) 137 mcg tablet Take 137 mcg by mouth Daily (before breakfast). 90 Tab 1    Cholecalciferol, Vitamin D3, (VITAMIN D3) 2,000 unit cap capsule Take  by mouth daily. Allergies   Allergen Reactions    Soy Anaphylaxis     History reviewed. No pertinent past medical history.   Past Surgical History:   Procedure Laterality Date    HX VASECTOMY       Family History   Problem Relation Age of Onset    High Cholesterol Father     Migraines Sister     Thyroid Disease Sister      Social History     Tobacco Use    Smoking status: Never Smoker    Smokeless tobacco: Never Used   Substance Use Topics    Alcohol use: Yes        Review of Systems   Constitutional: Negative for chills and fever. HENT: Positive for tinnitus (bilateral). Negative for hearing loss. Eyes: Negative for blurred vision and double vision. Respiratory: Negative for cough and shortness of breath. Cardiovascular: Negative for chest pain and palpitations. Gastrointestinal: Negative for nausea and vomiting. Genitourinary: Negative for dysuria and frequency. Musculoskeletal: Negative for back pain and falls. Skin: Negative for itching and rash. Neurological: Negative for dizziness, loss of consciousness and headaches. Psychiatric/Behavioral: Negative for depression. The patient is not nervous/anxious. Physical Exam  Vitals signs and nursing note reviewed. Constitutional:       Appearance: Normal appearance. He is well-developed. HENT:      Head: Normocephalic and atraumatic. Right Ear: External ear normal.      Left Ear: External ear normal.      Nose: Nose normal.   Eyes:      Conjunctiva/sclera: Conjunctivae normal.      Pupils: Pupils are equal, round, and reactive to light. Neck:      Musculoskeletal: Normal range of motion and neck supple. Cardiovascular:      Rate and Rhythm: Normal rate and regular rhythm. Pulses: Normal pulses. Heart sounds: Normal heart sounds. Pulmonary:      Effort: Pulmonary effort is normal.      Breath sounds: Normal breath sounds. Abdominal:      General: Bowel sounds are normal.      Palpations: Abdomen is soft. Musculoskeletal: Normal range of motion. Skin:     General: Skin is warm and dry. Neurological:      Mental Status: He is alert and oriented to person, place, and time. Psychiatric:         Speech: Speech normal.         Behavior: Behavior normal.         Thought Content:  Thought content normal. Judgment: Judgment normal.           ASSESSMENT and PLAN  Diagnoses and all orders for this visit:    1. Physical exam    2. Laboratory tests ordered as part of a complete physical exam (CPE)  -     LIPID PANEL  -     METABOLIC PANEL, COMPREHENSIVE  -     CBC W/O DIFF  -     PSA W/ REFLX FREE PSA  -     URINALYSIS W/ RFLX MICROSCOPIC  -     TSH AND FREE T4    3. Mixed hyperlipidemia  Presumed stable, will assess levels today. Pt continues with Atorvastatin 10 mg/d.   -     LIPID PANEL    4. Acquired hypothyroidism  Presumed stable, will assess levels today. Pt continues with Unithroid 137 mcg/d.   -     TSH AND FREE T4    5. Attention deficit disorder, unspecified hyperactivity presence  Pt continues with Vyvanse 30 mg/BID. -     lisdexamfetamine (VYVANSE) 30 mg capsule; Take 1 Cap by mouth two (2) times a day for 30 days. Max Daily Amount: 60 mg.  -     lisdexamfetamine (VYVANSE) 30 mg capsule; Take 1 Cap by mouth two (2) times a day. Max Daily Amount: 60 mg.  -     lisdexamfetamine (VYVANSE) 30 mg capsule; Take 1 Cap by mouth two (2) times a day. Max Daily Amount: 60 mg.    6. Tinnitus of both ears    7. Tick bite, subsequent encounter  -     LYME AB, IGM, WITH REFLEX WBLOT  -     R RICKETTSII AB IGG W/REFL    8. Plantar fasciitis    Follow-up and Dispositions    · Return in about 3 months (around 3/9/2020) for ADD. Medication risks/benefits/costs/interactions/alternatives discussed with patient. Advised patient to call back or return to office if symptoms worsen/change/persist.  If patient cannot reach us or should anything more severe/urgent arise he/she should proceed directly to the nearest emergency department. Discussed expected course/resolution/complications of diagnosis in detail with patient. Patient given a written after visit summary which includes her diagnoses, current medications and vitals. Patient expressed understanding with the diagnosis and plan.     Written by meek Alvarez as dictated by Day Pate M.D.

## 2019-12-09 NOTE — PROGRESS NOTES
Chief Complaint   Patient presents with    Complete Physical    Attention Deficit Disorder       Reviewed Record in preparation for visit and have obtained necessary documentation. Identified pt with two pt identifiers (Name @ )    There are no preventive care reminders to display for this patient. 1. Have you been to the ER, urgent care clinic since your last visit? Hospitalized since your last visit? No      2. Have you seen or consulted any other health care providers outside of the [address] [place] [address] since your last visit? Include any pap smears or colon screening.  no

## 2019-12-09 NOTE — PATIENT INSTRUCTIONS
Attention Deficit Hyperactivity Disorder (ADHD) in Adults: Care Instructions Your Care Instructions Attention deficit hyperactivity disorder, or ADHD, is a condition that makes it hard to pay attention. So you may have problems when you try to focus, get organized, and finish tasks. It might make you more active than other people. Or you might do things without thinking first. 
ADHD is very common. It usually starts in early childhood. Many adults don't realize they have it until their children are diagnosed. Then they become aware of their own symptoms. Doctors don't know what causes ADHD. But it often runs in families. ADHD can be treated with medicines, behavior training, and counseling. Treatment can improve your life. Follow-up care is a key part of your treatment and safety. Be sure to make and go to all appointments, and call your doctor if you are having problems. It's also a good idea to know your test results and keep a list of the medicines you take. How can you care for yourself at home? · Learn all you can about ADHD. This will help you and your family understand it better. · Take your medicines exactly as prescribed. Call your doctor if you think you are having a problem with your medicine. You will get more details on the specific medicines your doctor prescribes. · If you miss a dose of your medicine, do not take an extra dose. · If your doctor suggests counseling, find a counselor you like and trust. Talk openly and honestly. Be willing to make some changes. · Find a support group for adults with ADHD. Talking to others with the same problems can help you feel better. It can also give you ideas about how to best cope with the condition. · Get rid of distractions at your work space. Keep your desk clean. Try not to face a window or busy hallway. · Use files, planners, and other tools to keep you organized. · Limit use of alcohol, and do not use illegal drugs.  People with ADHD tend to develop substance use disorder more easily than others. Tell your doctor if you need help to quit. Counseling, support groups, and sometimes medicines can help you stay free of alcohol or drugs. · Get at least 30 minutes of physical activity on most days of the week. Exercise has been shown to help people cope with ADHD. Walking is a good choice. You also may want to do other activities, such as running, swimming, cycling, or playing tennis or team sports. When should you call for help? Watch closely for changes in your health, and be sure to contact your doctor if: 
  · You feel sad a lot or cry all the time.  
  · You have trouble sleeping, or you sleep too much.  
  · You find it hard to concentrate, make decisions, or remember things.  
  · You change how you normally eat.  
  · You feel guilty for no reason. Where can you learn more? Go to http://ja-darwin.info/. Enter B196 in the search box to learn more about \"Attention Deficit Hyperactivity Disorder (ADHD) in Adults: Care Instructions. \" Current as of: May 28, 2019 Content Version: 12.2 © 5217-2233 [employer]. Care instructions adapted under license by [employer] (which disclaims liability or warranty for this information). If you have questions about a medical condition or this instruction, always ask your healthcare professional. [address] any warranty or liability for your use of this information.

## 2019-12-13 LAB
ALBUMIN SERPL-MCNC: 4.2 G/DL (ref 3.5–5.5)
ALBUMIN/GLOB SERPL: 2.1 {RATIO} (ref 1.2–2.2)
ALP SERPL-CCNC: 49 IU/L (ref 39–117)
ALT SERPL-CCNC: 24 IU/L (ref 0–44)
APPEARANCE UR: CLEAR
AST SERPL-CCNC: 17 IU/L (ref 0–40)
B BURGDOR IGM SER IA-ACNC: <0.8 INDEX (ref 0–0.79)
BILIRUB SERPL-MCNC: 0.4 MG/DL (ref 0–1.2)
BILIRUB UR QL STRIP: NEGATIVE
BUN SERPL-MCNC: 14 MG/DL (ref 6–24)
BUN/CREAT SERPL: 13 (ref 9–20)
CALCIUM SERPL-MCNC: 9 MG/DL (ref 8.7–10.2)
CHLORIDE SERPL-SCNC: 104 MMOL/L (ref 96–106)
CHOLEST SERPL-MCNC: 180 MG/DL (ref 100–199)
CO2 SERPL-SCNC: 25 MMOL/L (ref 20–29)
COLOR UR: YELLOW
CREAT SERPL-MCNC: 1.1 MG/DL (ref 0.76–1.27)
ERYTHROCYTE [DISTWIDTH] IN BLOOD BY AUTOMATED COUNT: 12.6 % (ref 12.3–15.4)
GLOBULIN SER CALC-MCNC: 2 G/DL (ref 1.5–4.5)
GLUCOSE SERPL-MCNC: 94 MG/DL (ref 65–99)
GLUCOSE UR QL: NEGATIVE
HCT VFR BLD AUTO: 40.4 % (ref 37.5–51)
HDLC SERPL-MCNC: 48 MG/DL
HGB BLD-MCNC: 13.9 G/DL (ref 13–17.7)
HGB UR QL STRIP: NEGATIVE
INTERPRETATION, 910389: NORMAL
KETONES UR QL STRIP: NEGATIVE
LDLC SERPL CALC-MCNC: 114 MG/DL (ref 0–99)
LEUKOCYTE ESTERASE UR QL STRIP: NEGATIVE
MCH RBC QN AUTO: 31.2 PG (ref 26.6–33)
MCHC RBC AUTO-ENTMCNC: 34.4 G/DL (ref 31.5–35.7)
MCV RBC AUTO: 91 FL (ref 79–97)
MICRO URNS: NORMAL
NITRITE UR QL STRIP: NEGATIVE
PH UR STRIP: 6.5 [PH] (ref 5–7.5)
PLATELET # BLD AUTO: 209 X10E3/UL (ref 150–450)
POTASSIUM SERPL-SCNC: 4.4 MMOL/L (ref 3.5–5.2)
PROT SERPL-MCNC: 6.2 G/DL (ref 6–8.5)
PROT UR QL STRIP: NEGATIVE
PSA SERPL-MCNC: 2.5 NG/ML (ref 0–4)
R RICKETTSI IGG SER QL IA: POSITIVE
R RICKETTSI IGG TITR SER IF: ABNORMAL {TITER}
RBC # BLD AUTO: 4.45 X10E6/UL (ref 4.14–5.8)
REFLEX CRITERIA: NORMAL
SODIUM SERPL-SCNC: 143 MMOL/L (ref 134–144)
SP GR UR: 1.02 (ref 1–1.03)
T4 FREE SERPL-MCNC: 1.43 NG/DL (ref 0.82–1.77)
TRIGL SERPL-MCNC: 91 MG/DL (ref 0–149)
TSH SERPL DL<=0.005 MIU/L-ACNC: 1.62 UIU/ML (ref 0.45–4.5)
UROBILINOGEN UR STRIP-MCNC: 0.2 MG/DL (ref 0.2–1)
VLDLC SERPL CALC-MCNC: 18 MG/DL (ref 5–40)
WBC # BLD AUTO: 5 X10E3/UL (ref 3.4–10.8)

## 2019-12-22 ENCOUNTER — TELEPHONE (OUTPATIENT)
Dept: FAMILY MEDICINE CLINIC | Age: 49
End: 2019-12-22

## 2019-12-22 DIAGNOSIS — A77.0 ROCKY MOUNTAIN SPOTTED FEVER: Primary | ICD-10-CM

## 2019-12-22 RX ORDER — DOXYCYCLINE 100 MG/1
100 TABLET ORAL 2 TIMES DAILY
Qty: 28 TAB | Refills: 0 | Status: SHIPPED | OUTPATIENT
Start: 2019-12-22 | End: 2020-01-01

## 2019-12-22 NOTE — PROGRESS NOTES
Mr. Delfino Jorge do another round of Doxycycline 100 mg BID x 14 days #28 no refills    LDL -- slightly elevated----work on a low fat diet

## 2020-03-09 ENCOUNTER — OFFICE VISIT (OUTPATIENT)
Dept: FAMILY MEDICINE CLINIC | Age: 50
End: 2020-03-09

## 2020-03-09 VITALS
OXYGEN SATURATION: 98 % | SYSTOLIC BLOOD PRESSURE: 117 MMHG | DIASTOLIC BLOOD PRESSURE: 78 MMHG | HEART RATE: 73 BPM | RESPIRATION RATE: 16 BRPM | HEIGHT: 74 IN | TEMPERATURE: 98 F | BODY MASS INDEX: 25.85 KG/M2 | WEIGHT: 201.4 LBS

## 2020-03-09 DIAGNOSIS — R97.20 ELEVATED PSA: ICD-10-CM

## 2020-03-09 DIAGNOSIS — F98.8 ATTENTION DEFICIT DISORDER (ADD) WITHOUT HYPERACTIVITY: Primary | ICD-10-CM

## 2020-03-09 DIAGNOSIS — M77.12 EPICONDYLITIS, LATERAL, LEFT: ICD-10-CM

## 2020-03-09 RX ORDER — BISMUTH SUBSALICYLATE 262 MG
1 TABLET,CHEWABLE ORAL DAILY
COMMUNITY
End: 2021-12-13 | Stop reason: ALTCHOICE

## 2020-03-09 RX ORDER — DICLOFENAC SODIUM 10 MG/G
2 GEL TOPICAL 4 TIMES DAILY
Qty: 100 G | Refills: 2 | Status: SHIPPED | OUTPATIENT
Start: 2020-03-09 | End: 2020-09-14

## 2020-03-09 NOTE — PROGRESS NOTES
HPI  Queenie Prado 52 y.o. male  presents to the office today for follow up on ADD. Blood pressure 117/78, pulse 73, temperature 98 °F (36.7 °C), temperature source Oral, resp. rate 16, height 6' 2\" (1.88 m), weight 201 lb 6.4 oz (91.4 kg), SpO2 98 %. Body mass index is 25.86 kg/m². Chief Complaint   Patient presents with    Attention Deficit Disorder     new contract signed today      ADD: Pt continues with Vyvanse 30 mg/BID. Pt is required to sign a new controlled substance agreement today. The Prescription Monitoring Program has been reviewed for recent activity regarding controlled substances for this patient. Since last OV in Dec, pt started taking multivitamin along with cholecalciferol. PSA was 2.5 on 12/9/19. Pt is not aware of FHx of prostate cancer. Pt complains of pain in the lateral aspect of left elbow since [place]. Pt initially thought that it would go away after a while but it never did. Pt has tried wearing compression sleeve which seems to help. Current Outpatient Medications   Medication Sig Dispense Refill    multivitamin (ONE A DAY) tablet Take 1 Tab by mouth daily.  [START ON 5/9/2020] lisdexamfetamine (VYVANSE) 30 mg capsule Take 1 Cap by mouth two (2) times a day for 30 days. Max Daily Amount: 60 mg. 60 Cap 0    [START ON 4/9/2020] lisdexamfetamine (VYVANSE) 30 mg capsule Take 1 Cap by mouth two (2) times a day. Max Daily Amount: 60 mg. 60 Cap 0    lisdexamfetamine (VYVANSE) 30 mg capsule Take 1 Cap by mouth two (2) times a day. Max Daily Amount: 60 mg. 60 Cap 0    diclofenac (VOLTAREN) 1 % gel Apply 2 g to affected area four (4) times daily. 100 g 2    atorvastatin (LIPITOR) 10 mg tablet TAKE 1 TABLET BY MOUTH EVERY DAY 90 Tab 1    levothyroxine (UNITHROID) 137 mcg tablet Take 137 mcg by mouth Daily (before breakfast). 90 Tab 1    Cholecalciferol, Vitamin D3, (VITAMIN D3) 2,000 unit cap capsule Take  by mouth daily.        Allergies   Allergen Reactions  Soy Anaphylaxis     History reviewed. No pertinent past medical history. Past Surgical History:   Procedure Laterality Date    HX VASECTOMY       Family History   Problem Relation Age of Onset    High Cholesterol Father     Migraines Sister     Thyroid Disease Sister      Social History     Tobacco Use    Smoking status: Never Smoker    Smokeless tobacco: Never Used   Substance Use Topics    Alcohol use: Yes        Review of Systems   Constitutional: Negative for chills and fever. HENT: Negative for hearing loss and tinnitus. Eyes: Negative for blurred vision and double vision. Respiratory: Negative for cough and shortness of breath. Cardiovascular: Negative for chest pain and palpitations. Gastrointestinal: Negative for nausea and vomiting. Genitourinary: Negative for dysuria and frequency. Musculoskeletal: Positive for joint pain (left elbow). Negative for back pain and falls. Skin: Negative for itching and rash. Neurological: Negative for dizziness, loss of consciousness and headaches. Psychiatric/Behavioral: Negative for depression. The patient is not nervous/anxious. Physical Exam  Vitals signs and nursing note reviewed. Constitutional:       Appearance: Normal appearance. He is well-developed. HENT:      Head: Normocephalic and atraumatic. Right Ear: External ear normal.      Left Ear: External ear normal.      Nose: Nose normal.   Eyes:      Conjunctiva/sclera: Conjunctivae normal.      Pupils: Pupils are equal, round, and reactive to light. Neck:      Musculoskeletal: Normal range of motion and neck supple. Cardiovascular:      Rate and Rhythm: Normal rate and regular rhythm. Pulses: Normal pulses. Heart sounds: Normal heart sounds. Pulmonary:      Effort: Pulmonary effort is normal.      Breath sounds: Normal breath sounds. Abdominal:      General: Bowel sounds are normal.      Palpations: Abdomen is soft.    Musculoskeletal: Normal range of motion. Comments: Tenderness on palpation on the left lateral condyle   Skin:     General: Skin is warm and dry. Neurological:      Mental Status: He is alert and oriented to person, place, and time. Psychiatric:         Speech: Speech normal.         Behavior: Behavior normal.         Thought Content: Thought content normal.         Judgment: Judgment normal.           ASSESSMENT and PLAN  Diagnoses and all orders for this visit:    1. Attention deficit disorder (ADD) without hyperactivity  Pt continues with Vyvanse 30 mg/BID. Pt signed new controlled substance agreement today. The Prescription Monitoring Program has been reviewed for recent activity regarding controlled substances for this patient. -     lisdexamfetamine (VYVANSE) 30 mg capsule; Take 1 Cap by mouth two (2) times a day for 30 days. Max Daily Amount: 60 mg.  -     lisdexamfetamine (VYVANSE) 30 mg capsule; Take 1 Cap by mouth two (2) times a day. Max Daily Amount: 60 mg.  -     lisdexamfetamine (VYVANSE) 30 mg capsule; Take 1 Cap by mouth two (2) times a day. Max Daily Amount: 60 mg.    2. Elevated PSA  Last PSA 2.5. Discussed with pt that the levels are on high normal range. Will recheck levels. -     PSA W/ REFLX FREE PSA; Future    3. Epicondylitis, lateral, left  Pt displays sx of classic epicondylitis. Advised pt to apply Voltarel 1% gel, rub in for 5 min in order to activate the medication. Recommended some exercises to improve pain. -     diclofenac (VOLTAREN) 1 % gel; Apply 2 g to affected area four (4) times daily. Follow-up and Dispositions    · Return in about 3 months (around 6/9/2020) for ADD. Medication risks/benefits/costs/interactions/alternatives discussed with patient. Advised patient to call back or return to office if symptoms worsen/change/persist.  If patient cannot reach us or should anything more severe/urgent arise he/she should proceed directly to the nearest emergency department.   Discussed expected course/resolution/complications of diagnosis in detail with patient. Patient given a written after visit summary which includes diagnoses, current medications and vitals. Patient expressed understanding with the diagnosis and plan. Written by meek Olivo, as dictated by Leia Dias M.D.    12:02 PM - 12:18 PM    Total time spent with the patient 16 minutes, greater than 50% of time spent counseling patient.

## 2020-03-09 NOTE — PROGRESS NOTES
Chief Complaint   Patient presents with    Attention Deficit Disorder     new contract signed today       Reviewed Record in preparation for visit and have obtained necessary documentation. Identified pt with two pt identifiers (Name @ )    There are no preventive care reminders to display for this patient. 1. Have you been to the ER, urgent care clinic since your last visit? Hospitalized since your last visit? no    2. Have you seen or consulted any other health care providers outside of the [address] [place] [address] since your last visit? Include any pap smears or colon screening.  no

## 2020-03-09 NOTE — PATIENT INSTRUCTIONS
Tennis Elbow: Exercises Introduction Here are some examples of exercises for you to try. The exercises may be suggested for a condition or for rehabilitation. Start each exercise slowly. Ease off the exercises if you start to have pain. You will be told when to start these exercises and which ones will work best for you. How to do the exercises Wrist flexor stretch 1. Extend your arm in front of you with your palm up. 2. Bend your wrist, pointing your hand toward the floor. 3. With your other hand, gently bend your wrist farther until you feel a mild to moderate stretch in your forearm. 4. Hold for at least 15 to 30 seconds. Repeat 2 to 4 times. Wrist extensor stretch 1. Repeat steps 1 to 4 of the stretch above but begin with your extended hand palm down. Ball or sock squeeze 1. Hold a tennis ball (or a rolled-up sock) in your hand. 2. Make a fist around the ball (or sock) and squeeze. 3. Hold for about 6 seconds, and then relax for up to 10 seconds. 4. Repeat 8 to 12 times. 5. Switch the ball (or sock) to your other hand and do 8 to 12 times. Wrist deviation 1. Sit so that your arm is supported but your hand hangs off the edge of a flat surface, such as a table. 2. Hold your hand out like you are shaking hands with someone. 3. Move your hand up and down. 4. Repeat this motion 8 to 12 times. 5. Switch arms. 6. Try to do this exercise twice with each hand. Wrist curls 1. Place your forearm on a table with your hand hanging over the edge of the table, palm up. 2. Place a 1- to 2-pound weight in your hand. This may be a dumbbell, a can of food, or a filled water bottle. 3. Slowly raise and lower the weight while keeping your forearm on the table and your palm facing up. 4. Repeat this motion 8 to 12 times. 5. Switch arms, and do steps 1 through 4. 
6. Repeat with your hand facing down toward the floor. Switch arms. Biceps curls 1. Sit leaning forward with your legs slightly spread and your left hand on your left thigh. 2. Place your right elbow on your right thigh, and hold the weight with your forearm horizontal. 
3. Slowly curl the weight up and toward your chest. 
4. Repeat this motion 8 to 12 times. 5. Switch arms, and do steps 1 through 4. Follow-up care is a key part of your treatment and safety. Be sure to make and go to all appointments, and call your doctor if you are having problems. It's also a good idea to know your test results and keep a list of the medicines you take. Where can you learn more? Go to http://ja-darwin.info/. Enter N091 in the search box to learn more about \"Tennis Elbow: Exercises. \" Current as of: June 26, 2019 Content Version: 12.2 © 9482-5368 [employer]. Care instructions adapted under license by [employer] (which disclaims liability or warranty for this information). If you have questions about a medical condition or this instruction, always ask your healthcare professional. [address] any warranty or liability for your use of this information.

## 2020-03-10 ENCOUNTER — TELEPHONE (OUTPATIENT)
Dept: FAMILY MEDICINE CLINIC | Age: 50
End: 2020-03-10

## 2020-03-10 DIAGNOSIS — E78.2 MIXED HYPERLIPIDEMIA: ICD-10-CM

## 2020-03-10 NOTE — TELEPHONE ENCOUNTER
Received PA form from [facility] for patient Diclofenac gel [facility] and its PENDING

## 2020-03-10 NOTE — TELEPHONE ENCOUNTER
LastRefill:9/9/19  LOV:    Monday, March 09, 2020   UOV:   Wednesday, June 17, 2020   Lab Results   Component Value Date/Time    Cholesterol, total 180 12/11/2019 08:19 AM    HDL Cholesterol 48 12/11/2019 08:19 AM    LDL, calculated 114 (H) 12/11/2019 08:19 AM    VLDL, calculated 18 12/11/2019 08:19 AM    Triglyceride 91 12/11/2019 08:19 AM    CHOL/HDL Ratio 3.7 05/01/2009 02:28 PM

## 2020-03-10 NOTE — TELEPHONE ENCOUNTER
.Pharmacy is requesting a 90 day supply on the medication. .  Requested Prescriptions     Pending Prescriptions Disp Refills    atorvastatin (LIPITOR) 10 mg tablet 90 Tab 1             . Pharmacy on file verified          [address]: Monday, March 09, 2020   UOV:  Wednesday, June 17, 2020

## 2020-03-12 ENCOUNTER — PATIENT MESSAGE (OUTPATIENT)
Dept: FAMILY MEDICINE CLINIC | Age: 50
End: 2020-03-12

## 2020-03-12 DIAGNOSIS — M77.12 EPICONDYLITIS, LATERAL, LEFT: ICD-10-CM

## 2020-03-12 RX ORDER — ATORVASTATIN CALCIUM 10 MG/1
TABLET, FILM COATED ORAL
Qty: 90 TAB | Refills: 1 | Status: SHIPPED | OUTPATIENT
Start: 2020-03-12 | End: 2021-03-16

## 2020-03-12 NOTE — TELEPHONE ENCOUNTER
Notified patient via my chart I did your prior authorization for your Diclofenac gel and it was denied by your insurance. Diclofenac gel is only covered under your plan if you have diagnosis of Osteoarthritis which you don't have that diagnosis.  I will send message to Dr. Ricco Mckinley to send alternative to pharmacy    Please send alternative

## 2020-04-03 NOTE — TELEPHONE ENCOUNTER
Chief Complaint   Patient presents with    Prior Auth     diclofenac 1% gel and its DENIED      Contacted [facility] at [phone] to assist with completing prior authorization for diclofenac 1% gel.   Daphne Alas LPN

## 2020-04-09 NOTE — TELEPHONE ENCOUNTER
Chief Complaint   Patient presents with    Prior Auth     [facility] :  [id number] STATING VOLTAREN IS PREFERRED. PRIOR AUTHORIZATION COMPLETED ON [employer] FOR BRAND VOLTAREN 15 GEL.

## 2020-04-09 NOTE — TELEPHONE ENCOUNTER
A prior authorization was completed on Voltaren 1% gel as preferred and approved for 04/09/2020 through 04/09/2023. Pharmacy was contacted by Teo Viveros LPN, was informed that Voltaren is not available ( manufacturing ). Shimon Guzman LPN. Alternative request sent to Dr. Gerri Blanco.   Shimon Guzman LPN

## 2020-04-09 NOTE — TELEPHONE ENCOUNTER
Received letter from [facility] volataren 1% gel is approved by his plan     Called [facility] [phone] spoke to [facility] pharmacist per [facility] Voltaren name brand no longer manufactured

## 2020-05-08 NOTE — TELEPHONE ENCOUNTER
Notified patient via my chart I did your prior authorization for your Diclofenac gel and it was denied by your insurance. Diclofenac gel is only covered under your plan if you have diagnosis of Osteoarthritis which you don't have that diagnosis.  I will send message to Dr. Ger[facility] Blanco [facility]

## 2020-06-17 ENCOUNTER — VIRTUAL VISIT (OUTPATIENT)
Dept: FAMILY MEDICINE CLINIC | Age: 50
End: 2020-06-17

## 2020-06-17 DIAGNOSIS — R97.20 ELEVATED PSA: ICD-10-CM

## 2020-06-17 DIAGNOSIS — F98.8 ATTENTION DEFICIT DISORDER (ADD) WITHOUT HYPERACTIVITY: Primary | ICD-10-CM

## 2020-06-17 DIAGNOSIS — E03.9 ACQUIRED HYPOTHYROIDISM: ICD-10-CM

## 2020-06-17 NOTE — PROGRESS NOTES
Lisa Byrd is a 52 y.o. male who was seen by synchronous (real-time) audio-video technology on 6/17/2020 through [employer] telemedicine application. Consent:  Services were provided through a video synchronous discussion virtually to substitute for in-person appointment. He and/or his healthcare decision maker is aware that this patient-initiated Telehealth encounter is a billable service, with coverage as determined by his insurance carrier. He is aware that he may receive a bill and has provided verbal consent to proceed: Yes    I was in the office while conducting this encounter. Subjective:   Lisa Byrd was seen for Attention Deficit Disorder    ADD: Pt requests refill of Vyvanse 30mg/BID. The Prescription Monitoring Program has been reviewed for recent activity regarding controlled substances for this patient. Hypothyroidism: Pt's TSH level was 1.620 on 12/11/2019. Lab reports from [facility] on 4/23/2020 indicate pt's TSH level was 0.75. Pt's medication was increased to Unithroid 137mcg/d by his Endocrinologist because pt was feeling groggy. Pt notes he feels better after increased medication dosage. Elevated PSA:  Last PSA level was 2.5 on 12/11/2019. Pt has an appointment to get labwork to check PSA levels tomorrow, 6/18. Lab order was already placed during last visit with pt. Pt notes he still has had a \"dribbling\" urine stream and goes to the bathroom once a night, but he notes that sxs have improved since last visit. Pt feels better with current medication to treat pain of left elbow, and is well today in office. Pt reports that he is exercising more as well. Pt reports that he switched from taking Cholecalciferol to multivitamin to treat low vit D levels. Prior to Admission medications    Medication Sig Start Date End Date Taking? Authorizing Provider   lisdexamfetamine (Vyvanse) 30 mg capsule Take 1 Cap by mouth two (2) times a day. Max Daily Amount: 60 mg. 8/17/20  Yes Amanuel Bauer MD   lisdexamfetamine (Vyvanse) 30 mg capsule Take 1 Cap by mouth two (2) times a day. Max Daily Amount: 60 mg. 7/17/20  Yes Amanuel Bauer MD   lisdexamfetamine (Vyvanse) 30 mg capsule Take 1 Cap by mouth every morning. Max Daily Amount: 30 mg. 6/17/20  Yes Amanuel Bauer MD   atorvastatin (LIPITOR) 10 mg tablet TAKE 1 TABLET BY MOUTH EVERY DAY 3/12/20  Yes Amanuel Bauer MD   multivitamin (ONE A DAY) tablet Take 1 Tab by mouth daily. Yes Provider, Historical   diclofenac (VOLTAREN) 1 % gel Apply 2 g to affected area four (4) times daily. 3/9/20  Yes Amanuel Bauer MD   levothyroxine (UNITHROID) 137 mcg tablet Take 137 mcg by mouth Daily (before breakfast). 6/12/19  Yes Amanuel Bauer MD   Cholecalciferol, Vitamin D3, (VITAMIN D3) 2,000 unit cap capsule Take  by mouth daily. Yes Provider, Historical   lisdexamfetamine (VYVANSE) 30 mg capsule Take 1 Cap by mouth two (2) times a day. Max Daily Amount: 60 mg. 4/9/20 6/17/20  Ruma Garcia MD   lisdexamfetamine (VYVANSE) 30 mg capsule Take 1 Cap by mouth two (2) times a day. Max Daily Amount: 60 mg. 3/9/20 6/17/20  Amanuel Bauer MD     Allergies   Allergen Reactions    Soy Anaphylaxis     History reviewed. No pertinent past medical history. Past Surgical History:   Procedure Laterality Date    HX VASECTOMY       Family History   Problem Relation Age of Onset    High Cholesterol Father     Migraines Sister     Thyroid Disease Sister      Social History     Tobacco Use    Smoking status: Never Smoker    Smokeless tobacco: Never Used   Substance Use Topics    Alcohol use: Yes        Review of Systems   Constitutional: Negative for chills and fever. HENT: Negative for hearing loss and tinnitus. Eyes: Negative for blurred vision and double vision. Respiratory: Negative for cough and shortness of breath. Cardiovascular: Negative for chest pain and palpitations.    Gastrointestinal: Negative for nausea and vomiting. Genitourinary: Negative for dysuria and frequency. Musculoskeletal: Negative for back pain and falls. Skin: Negative for itching and rash. Neurological: Negative for dizziness, loss of consciousness and headaches. Endo/Heme/Allergies: Negative. Psychiatric/Behavioral: Negative for depression. The patient is not nervous/anxious. PHYSICAL EXAMINATION:  Vital Signs: (As obtained by patient/caregiver at home)  There were no vitals taken for this visit. Constitutional: [x] Appears well-developed and well-nourished [x] No apparent distress      [] Abnormal -     Mental status: [x] Alert and awake  [x] Oriented to person/place/time [x] Able to follow commands    [] Abnormal -     Eyes:   EOM    [x]  Normal    [] Abnormal -   Sclera  [x]  Normal    [] Abnormal -          Discharge [x]  None visible   [] Abnormal -     HENT: [x] Normocephalic, atraumatic  [] Abnormal -   [x] Mouth/Throat: Mucous membranes are moist    External Ears [x] Normal  [] Abnormal -    Neck: [x] No visualized mass [] Abnormal -     Pulmonary/Chest: [x] Respiratory effort normal   [x] No visualized signs of difficulty breathing or respiratory distress        [] Abnormal -      Musculoskeletal:   [x] Normal gait with no signs of ataxia         [x] Normal range of motion of neck        [] Abnormal -     Neurological:        [x] No Facial Asymmetry (Cranial nerve 7 motor function) (limited exam due to video visit)          [x] No gaze palsy        [] Abnormal -          Skin:        [x] No significant exanthematous lesions or discoloration noted on facial skin         [] Abnormal -            Psychiatric:       [x] Normal Affect [] Abnormal -        [x] No Hallucinations    Other pertinent observable physical exam findings:-    Assessment & Plan:   Diagnoses and all orders for this visit:    1. Attention deficit disorder (ADD) without hyperactivity  Presumed stable. Refill request completed for Vyvanse 30mg/BID.  The Prescription Monitoring Program has been reviewed for recent activity regarding controlled substances for this patient. -     lisdexamfetamine (Vyvanse) 30 mg capsule; Take 1 Cap by mouth two (2) times a day. Max Daily Amount: 60 mg.  -     lisdexamfetamine (Vyvanse) 30 mg capsule; Take 1 Cap by mouth two (2) times a day. Max Daily Amount: 60 mg.  -     lisdexamfetamine (Vyvanse) 30 mg capsule; Take 1 Cap by mouth every morning. Max Daily Amount: 30 mg.    2. Elevated PSA   Last PSA level was 2.5 on 12/11/2019. Pt has an appointment to get labwork to check PSA levels tomorrow, 6/18. Lab order was already placed during last visit with pt.     3. Acquired hypothyroidism  Lab reports from [facility] on 4/23/2020 indicate pt's TSH level was 0.75. Advised to continue with Unithroid 137mcg/d. Pt notes he feels better after increased medication dosage. Pt is under care of endocrinology specialist.      Follow-up and Dispositions    · Return in about 3 months (around 9/17/2020). We discussed the expected course, resolution and complications of the diagnosis(es) in detail. Medication risks, benefits, costs, interactions, and alternatives were discussed as indicated. I advised her to contact the office if her condition worsens, changes or fails to improve as anticipated. She expressed understanding with the diagnosis(es) and plan. Pursuant to the emergency declaration under the [facility] and [facility] [place], [facility] waiver authority and the [employer] General Act, this Virtual Visit was conducted, with patient's consent, to reduce the patient's risk of exposure to COVID-19 and provide continuity of care for an established patient. Services were provided through a video synchronous discussion virtually to substitute for in-person clinic visit.      Written by corina Dinero, as dictated by Bentley Munoz M.D.    7:59 AM - 8:15 AM     Total time spent with the patient 16 minutes, greater than 50% of time spent counseling patient.

## 2020-06-18 ENCOUNTER — LAB ONLY (OUTPATIENT)
Dept: FAMILY MEDICINE CLINIC | Age: 50
End: 2020-06-18

## 2020-06-18 DIAGNOSIS — R97.20 ELEVATED PSA: ICD-10-CM

## 2020-06-19 LAB
PSA SERPL-MCNC: 2.5 NG/ML (ref 0–4)
REFLEX CRITERIA: NORMAL

## 2020-09-01 DIAGNOSIS — F98.8 ATTENTION DEFICIT DISORDER (ADD) WITHOUT HYPERACTIVITY: ICD-10-CM

## 2020-09-01 NOTE — TELEPHONE ENCOUNTER
MD Garcia,    Patient called and pharmacy confirmed that patient needs a Prior Authorization for the Vyvanse rx. Patient needs asap. ThanksColette    Previous Refill Encounter(s): 8/17/20  30    Requested Prescriptions     Pending Prescriptions Disp Refills    lisdexamfetamine (Vyvanse) 30 mg capsule 60 Cap 0     Sig: Take 1 Cap by mouth two (2) times a day. Max Daily Amount: 60 mg.

## 2020-09-14 ENCOUNTER — OFFICE VISIT (OUTPATIENT)
Dept: FAMILY MEDICINE CLINIC | Age: 50
End: 2020-09-14
Payer: COMMERCIAL

## 2020-09-14 VITALS
SYSTOLIC BLOOD PRESSURE: 123 MMHG | RESPIRATION RATE: 20 BRPM | TEMPERATURE: 98 F | HEIGHT: 74 IN | HEART RATE: 69 BPM | DIASTOLIC BLOOD PRESSURE: 76 MMHG | OXYGEN SATURATION: 99 % | WEIGHT: 196 LBS | BODY MASS INDEX: 25.15 KG/M2

## 2020-09-14 DIAGNOSIS — Z12.11 COLON CANCER SCREENING: ICD-10-CM

## 2020-09-14 DIAGNOSIS — F98.8 ATTENTION DEFICIT DISORDER (ADD) WITHOUT HYPERACTIVITY: Primary | ICD-10-CM

## 2020-09-14 PROCEDURE — 99213 OFFICE O/P EST LOW 20 MIN: CPT | Performed by: FAMILY MEDICINE

## 2020-09-14 NOTE — PROGRESS NOTES
KIKE Hughes 48 y.o. male  presents to the office today for a follow-up for ADD. Blood pressure 123/76, pulse 69, temperature 98 °F (36.7 °C), resp. rate 20, height 6' 2\" (1.88 m), weight 196 lb (88.9 kg), SpO2 99 %. Body mass index is 25.16 kg/m². Chief Complaint   Patient presents with    Attention Deficit Disorder     pt been off Vyvanse for 10 days. want to discuss a lower dose. ADD: Pt comes in today for a refill of his ADD medication. Due to issues with having his prescription refilled, pt has been off of his Vyvanse 30 mg/BID. However, he will begin his medication regimen again today. The Prescription Monitoring Program has been reviewed for recent activity regarding controlled substances for this patient. Hyperlipidemia: Lipid panel on 12/11/2019 notable for total cholesterol 180, HDL 48, , and triglycerides 91. Pt continues with Lipitor 10 mg/day. Health maintenance: Pt is due for his colonoscopy; I have placed orders for one to be performed. Pt has a CPE scheduled for December. Current Outpatient Medications   Medication Sig Dispense Refill    [START ON 11/14/2020] lisdexamfetamine (Vyvanse) 30 mg capsule Take 1 Cap by mouth two (2) times a day. Max Daily Amount: 60 mg. 60 Cap 0    [START ON 10/14/2020] lisdexamfetamine (Vyvanse) 30 mg capsule Take 1 Cap by mouth two (2) times a day. Max Daily Amount: 60 mg. 60 Cap 0    lisdexamfetamine (Vyvanse) 30 mg capsule Take 1 Cap by mouth every morning. Max Daily Amount: 30 mg. 60 Cap 0    atorvastatin (LIPITOR) 10 mg tablet TAKE 1 TABLET BY MOUTH EVERY DAY 90 Tab 1    multivitamin (ONE A DAY) tablet Take 1 Tab by mouth daily.  levothyroxine (UNITHROID) 137 mcg tablet Take 137 mcg by mouth Daily (before breakfast). 90 Tab 1    Cholecalciferol, Vitamin D3, (VITAMIN D3) 2,000 unit cap capsule Take  by mouth daily. Allergies   Allergen Reactions    Soy Anaphylaxis     No past medical history on file.   Past Surgical History:   Procedure Laterality Date    HX VASECTOMY       Family History   Problem Relation Age of Onset    High Cholesterol Father     Migraines Sister     Thyroid Disease Sister      Social History     Tobacco Use    Smoking status: Never Smoker    Smokeless tobacco: Never Used   Substance Use Topics    Alcohol use: Yes        Review of Systems   Constitutional: Negative for chills and fever. HENT: Negative for hearing loss and tinnitus. Eyes: Negative for blurred vision and double vision. Respiratory: Negative for cough and shortness of breath. Cardiovascular: Negative for chest pain and palpitations. Gastrointestinal: Negative for nausea and vomiting. Genitourinary: Negative for dysuria and frequency. Musculoskeletal: Negative for back pain and falls. Skin: Negative for itching and rash. Neurological: Negative for dizziness, loss of consciousness and headaches. Endo/Heme/Allergies: Negative. Psychiatric/Behavioral: Negative for depression. The patient is not nervous/anxious. Physical Exam  Vitals signs reviewed. Constitutional:       Appearance: Normal appearance. HENT:      Head: Normocephalic and atraumatic. Right Ear: Tympanic membrane, ear canal and external ear normal.      Left Ear: Tympanic membrane, ear canal and external ear normal.      Nose: Nose normal.      Mouth/Throat:      Mouth: Mucous membranes are moist.      Pharynx: Oropharynx is clear. Eyes:      Extraocular Movements: Extraocular movements intact. Conjunctiva/sclera: Conjunctivae normal.      Pupils: Pupils are equal, round, and reactive to light. Neck:      Musculoskeletal: Normal range of motion and neck supple. Cardiovascular:      Rate and Rhythm: Normal rate and regular rhythm. Pulses: Normal pulses. Heart sounds: Normal heart sounds. Pulmonary:      Effort: Pulmonary effort is normal.      Breath sounds: Normal breath sounds.    Abdominal:      General: Abdomen is flat. Bowel sounds are normal.      Palpations: Abdomen is soft. Musculoskeletal: Normal range of motion. Skin:     General: Skin is warm and dry. Neurological:      General: No focal deficit present. Mental Status: He is alert and oriented to person, place, and time. Psychiatric:         Mood and Affect: Mood normal.         Behavior: Behavior normal.         Judgment: Judgment normal.           ASSESSMENT and PLAN  Diagnoses and all orders for this visit:    1. Colon cancer screening  Pt is due for his colonoscopy; I have placed orders for one to be performed. Pt has a CPE scheduled for December.   -     REFERRAL TO GASTROENTEROLOGY  -      COLONOSCOPY    2. Attention deficit disorder (ADD) without hyperactivity  Pt comes in today for a refill of his ADD medication. Due to issues with having his prescription refilled, pt has been off of his Vyvanse 30 mg/BID. However, he will begin his medication regimen again today. The Prescription Monitoring Program has been reviewed for recent activity regarding controlled substances for this patient. -     lisdexamfetamine (Vyvanse) 30 mg capsule; Take 1 Cap by mouth two (2) times a day. Max Daily Amount: 60 mg.  -     lisdexamfetamine (Vyvanse) 30 mg capsule; Take 1 Cap by mouth two (2) times a day. Max Daily Amount: 60 mg.  -     lisdexamfetamine (Vyvanse) 30 mg capsule; Take 1 Cap by mouth every morning. Max Daily Amount: 30 mg. Follow-up and Dispositions    · Return in about 3 months (around 12/14/2020) for physical.            Medication risks/benefits/costs/interactions/alternatives discussed with patient. Advised patient to call back or return to office if symptoms worsen/change/persist.  If patient cannot reach us or should anything more severe/urgent arise he/she should proceed directly to the nearest emergency department. Discussed expected course/resolution/complications of diagnosis in detail with patient.     Patient given a written after visit summary which includes diagnoses, current medications and vitals. Patient expressed understanding with the diagnosis and plan.     Written by meek Roa, as dictated by Keturah Shen M.D.    1:23 PM - 1:32 PM

## 2020-09-14 NOTE — PROGRESS NOTES
Chief Complaint   Patient presents with    Attention Deficit Disorder     pt been off Vyvanse for 10 days. want to discuss a lower dose.

## 2020-09-14 NOTE — PATIENT INSTRUCTIONS
Body Mass Index: Care Instructions Your Care Instructions Body mass index (BMI) can help you see if your weight is raising your risk for health problems. It uses a formula to compare how much you weigh with how tall you are. · A BMI lower than 18.5 is considered underweight. · A BMI between 18.5 and 24.9 is considered healthy. · A BMI between 25 and 29.9 is considered overweight. A BMI of 30 or higher is considered obese. If your BMI is in the normal range, it means that you have a lower risk for weight-related health problems. If your BMI is in the overweight or obese range, you may be at increased risk for weight-related health problems, such as high blood pressure, heart disease, stroke, arthritis or joint pain, and diabetes. If your BMI is in the underweight range, you may be at increased risk for health problems such as fatigue, lower protection (immunity) against illness, muscle loss, bone loss, hair loss, and hormone problems. BMI is just one measure of your risk for weight-related health problems. You may be at higher risk for health problems if you are not active, you eat an unhealthy diet, or you drink too much alcohol or use tobacco products. Follow-up care is a key part of your treatment and safety. Be sure to make and go to all appointments, and call your doctor if you are having problems. It's also a good idea to know your test results and keep a list of the medicines you take. How can you care for yourself at home? · Practice healthy eating habits. This includes eating plenty of fruits, vegetables, whole grains, lean protein, and low-fat dairy. · If your doctor recommends it, get more exercise. Walking is a good choice. Bit by bit, increase the amount you walk every day. Try for at least 30 minutes on most days of the week. · Do not smoke. Smoking can increase your risk for health problems.  If you need help quitting, talk to your doctor about stop-smoking programs and medicines. These can increase your chances of quitting for good. · Limit alcohol to 2 drinks a day for men and 1 drink a day for women. Too much alcohol can cause health problems. If you have a BMI higher than 25 · Your doctor may do other tests to check your risk for weight-related health problems. This may include measuring the distance around your waist. A waist measurement of more than 40 inches in men or 35 inches in women can increase the risk of weight-related health problems. · Talk with your doctor about steps you can take to stay healthy or improve your health. You may need to make lifestyle changes to lose weight and stay healthy, such as changing your diet and getting regular exercise. If you have a BMI lower than 18.5 · Your doctor may do other tests to check your risk for health problems. · Talk with your doctor about steps you can take to stay healthy or improve your health. You may need to make lifestyle changes to gain or maintain weight and stay healthy, such as getting more healthy foods in your diet and doing exercises to build muscle. Where can you learn more? Go to http://www.schaeffer.com/ Enter S176 in the search box to learn more about \"Body Mass Index: Care Instructions. \" Current as of: December 11, 2019               Content Version: 12.6 © 6044-5342 [employer]. Care instructions adapted under license by [employer] (which disclaims liability or warranty for this information). If you have questions about a medical condition or this instruction, always ask your healthcare professional. [address] any warranty or liability for your use of this information.

## 2020-11-02 ENCOUNTER — DOCUMENTATION ONLY (OUTPATIENT)
Dept: FAMILY MEDICINE CLINIC | Age: 50
End: 2020-11-02

## 2020-11-02 NOTE — PROGRESS NOTES
Placed an outgoing call to the pharmacy and verified that the patient was able to  the Vyvanse prescription #60 on 10/17/20. Issue with the PA appears to have be fixed. VO from Dr. Kathie Cagle to discontinue the once daily prescription, this was also communicated to the pharmacy as patient is taking it bid-pharmacy has those prescriptions.     Willy Childers, MariannaD, BCACP      CLINICAL PHARMACY CONSULT: MED RECONCILIATION/REVIEW ADDENDUM    For Pharmacy Admin Tracking Only    PHSO: P[facility] Patient?: Yes  Time Spent (min): 10    James Rodriguez PHARMD, BCACP

## 2020-12-14 ENCOUNTER — OFFICE VISIT (OUTPATIENT)
Dept: FAMILY MEDICINE CLINIC | Age: 50
End: 2020-12-14
Payer: COMMERCIAL

## 2020-12-14 VITALS
HEART RATE: 69 BPM | DIASTOLIC BLOOD PRESSURE: 75 MMHG | RESPIRATION RATE: 16 BRPM | OXYGEN SATURATION: 99 % | BODY MASS INDEX: 26.18 KG/M2 | SYSTOLIC BLOOD PRESSURE: 115 MMHG | HEIGHT: 74 IN | WEIGHT: 204 LBS | TEMPERATURE: 98.1 F

## 2020-12-14 DIAGNOSIS — Z23 ENCOUNTER FOR IMMUNIZATION: ICD-10-CM

## 2020-12-14 DIAGNOSIS — Z00.00 PHYSICAL EXAM: Primary | ICD-10-CM

## 2020-12-14 DIAGNOSIS — F98.8 ATTENTION DEFICIT DISORDER (ADD) WITHOUT HYPERACTIVITY: ICD-10-CM

## 2020-12-14 DIAGNOSIS — Z23 NEEDS FLU SHOT: ICD-10-CM

## 2020-12-14 DIAGNOSIS — E78.2 MIXED HYPERLIPIDEMIA: ICD-10-CM

## 2020-12-14 DIAGNOSIS — E03.9 ACQUIRED HYPOTHYROIDISM: ICD-10-CM

## 2020-12-14 DIAGNOSIS — E55.9 VITAMIN D DEFICIENCY: ICD-10-CM

## 2020-12-14 PROCEDURE — 99396 PREV VISIT EST AGE 40-64: CPT | Performed by: FAMILY MEDICINE

## 2020-12-14 PROCEDURE — 90686 IIV4 VACC NO PRSV 0.5 ML IM: CPT | Performed by: FAMILY MEDICINE

## 2020-12-14 PROCEDURE — 90471 IMMUNIZATION ADMIN: CPT | Performed by: FAMILY MEDICINE

## 2020-12-14 RX ORDER — ZOSTER VACCINE RECOMBINANT, ADJUVANTED 50 MCG/0.5
0.5 KIT INTRAMUSCULAR ONCE
Qty: 0.5 ML | Refills: 1 | Status: SHIPPED | OUTPATIENT
Start: 2020-12-14 | End: 2020-12-14

## 2020-12-14 NOTE — PATIENT INSTRUCTIONS
Vaccine Information Statement Influenza (Flu) Vaccine (Inactivated or Recombinant): What You Need to Know Many Vaccine Information Statements are available in [place] and other languages. See www.immunize.org/vis Hojas de información sobre vacunas están disponibles en español y en muchos otros idiomas. Visite www.immunize.org/vis 1. Why get vaccinated? Influenza vaccine can prevent influenza (flu). Flu is a contagious disease that spreads around the [facility] every year, usually between October and May. Anyone can get the flu, but it is more dangerous for some people. Infants and young children, people 72years of age and older, pregnant women, and people with certain health conditions or a weakened immune system are at greatest risk of flu complications. Pneumonia, bronchitis, sinus infections and ear infections are examples of flu-related complications. If you have a medical condition, such as heart disease, cancer or diabetes, flu can make it worse. Flu can cause fever and chills, sore throat, muscle aches, fatigue, cough, headache, and runny or stuffy nose. Some people may have vomiting and diarrhea, though this is more common in children than adults. Each year thousands of people in the [facility] die from flu, and many more are hospitalized. Flu vaccine prevents millions of illnesses and flu-related visits to the doctor each year. 2. Influenza vaccines CDC recommends everyone 10months of age and older get vaccinated every flu season. Children 6 months through 6years of age may need 2 doses during a single flu season. Everyone else needs only 1 dose each flu season. It takes about 2 weeks for protection to develop after vaccination. There are many flu viruses, and they are always changing. Each year a new flu vaccine is made to protect against three or four viruses that are likely to cause disease in the upcoming flu season. Even when the vaccine doesnt exactly match these viruses, it may still provide some protection. Influenza vaccine does not cause flu. Influenza vaccine may be given at the same time as other vaccines. 3. Talk with your health care provider Tell your vaccine provider if the person getting the vaccine: 
 Has had an allergic reaction after a previous dose of influenza vaccine, or has any severe, life-threatening allergies.  Has ever had Guillain-Barré Syndrome (also called GBS). In some cases, your health care provider may decide to postpone influenza vaccination to a future visit. People with minor illnesses, such as a cold, may be vaccinated. People who are moderately or severely ill should usually wait until they recover before getting influenza vaccine. Your health care provider can give you more information. 4. Risks of a reaction  Soreness, redness, and swelling where shot is given, fever, muscle aches, and headache can happen after influenza vaccine.  There may be a very small increased risk of Guillain-Barré Syndrome (GBS) after inactivated influenza vaccine (the flu shot). [facility] children who get the flu shot along with pneumococcal vaccine (PCV13), and/or DTaP vaccine at the same time might be slightly more likely to have a seizure caused by fever. Tell your health care provider if a child who is getting flu vaccine has ever had a seizure. People sometimes faint after medical procedures, including vaccination. Tell your provider if you feel dizzy or have vision changes or ringing in the ears. As with any medicine, there is a very remote chance of a vaccine causing a severe allergic reaction, other serious injury, or death. 5. What if there is a serious problem? An allergic reaction could occur after the vaccinated person leaves the clinic. If you see signs of a severe allergic reaction (hives, swelling of the face and throat, difficulty breathing, a fast heartbeat, dizziness, or weakness), call 9-1-1 and get the person to the nearest hospital. 
 
For other signs that concern you, call your health care provider. Adverse reactions should be reported to the Vaccine Adverse Event Reporting System (VAERS). Your health care provider will usually file this report, or you can do it yourself. Visit the VAERS website at www.vaers. hhs.gov or call 4-115.546.6459. VAERS is only for reporting reactions, and VAERS staff do not give medical advice. 6. The National Vaccine Injury Compensation Program 
 
The [facility] Vaccine Injury Compensation Program (VICP) is a federal program that was created to compensate people who may have been injured by certain vaccines. Visit the VICP website at www.hrsa.gov/vaccinecompensation or call 0-347.978.9335 to learn about the program and about filing a claim. There is a time limit to file a claim for compensation. 7. How can I learn more?  Ask your health care provider.  Call your local or state health department.  Contact the Centers for Disease Control and Prevention (CDC): 
- Call 9-673.489.4864 (1-800-CDC-INFO) or 
- Visit CDCs influenza website at www.cdc.gov/flu Vaccine Information Statement (Interim) Inactivated Influenza Vaccine 8/15/2019 
[address] [id number] Department of [facility] and [employer] Centers for Disease Control and Prevention Office Use Only

## 2020-12-14 NOTE — PROGRESS NOTES
Chief Complaint   Patient presents with    Complete Physical     1. Have you been to the ER, urgent care clinic since your last visit? Hospitalized since your last visit? no  2. Have you seen or consulted any other health care providers outside of the [address] [place] [address] since your last visit? Include any pap smears or colon screening.    Yes GI Colonoscopy 10/2020  Prostate check 2020  No Vision exam

## 2020-12-14 NOTE — PROGRESS NOTES
HPI  Mel Suazo 48 y.o. male  presents to the office today for a CPE. Blood pressure 115/75, pulse 69, temperature 98.1 °F (36.7 °C), temperature source Temporal, resp. rate 16, height 6' 2\" (1.88 m), weight 204 lb (92.5 kg), SpO2 99 %. Body mass index is 26.19 kg/m². Chief Complaint   Patient presents with    Complete Physical        CPE: Pt presents today for a CPE, which I performed. All findings were normal. A prostate exam was performed. Hyperlipidemia: Lipid panel on 12/11/2019 notable for total cholesterol 180, HDL 48, , and triglycerides 91. Pt continues with Lipitor 10 mg/day. Hypothyroidism: Pt's TSH level was 1.620 on 12/11/2019. Pt continues with Unithroid 137 mcg/day. Vitamin D deficiency: Pt's vitamin D levels were 44.3 on 9/20/2018. Pt continues with cholecalciferol 2,000 units/day. ADD: Pt continues with Vyvanse 30 mg/BID. Pt requests a refill of their medication, which I have granted. Health maintenance: Pt requests to receive their  flu shot during today's OV;  I will have one of my nurses administer it. Provided pt with prescription and will follow up with local pharmacy for Shingrix vaccine. Right side slightly larger than left, slight asymmetry    Current Outpatient Medications   Medication Sig Dispense Refill    [START ON 2/14/2021] lisdexamfetamine (Vyvanse) 30 mg capsule Take 1 Cap by mouth two (2) times a day. Max Daily Amount: 60 mg. 60 Cap 0    [START ON 1/14/2021] lisdexamfetamine (Vyvanse) 30 mg capsule Take 1 Cap by mouth two (2) times a day. Max Daily Amount: 60 mg. 60 Cap 0    lisdexamfetamine (Vyvanse) 30 mg capsule Take 1 Cap by mouth two (2) times a day. Max Daily Amount: 60 mg. 60 Cap 0    varicella-zoster recombinant, PF, (Shingrix, PF,) 50 mcg/0.5 mL susr injection 0.5 mL by IntraMUSCular route once for 1 dose. 0.5 mL 1    multivitamin (ONE A DAY) tablet Take 1 Tab by mouth daily.       levothyroxine (UNITHROID) 137 mcg tablet Take 137 mcg by mouth Daily (before breakfast). 90 Tab 1    Cholecalciferol, Vitamin D3, (VITAMIN D3) 2,000 unit cap capsule Take  by mouth daily.  atorvastatin (LIPITOR) 10 mg tablet TAKE 1 TABLET BY MOUTH EVERY DAY 90 Tab 1     Allergies   Allergen Reactions    Soy Anaphylaxis     History reviewed. No pertinent past medical history. Past Surgical History:   Procedure Laterality Date    HX VASECTOMY       Family History   Problem Relation Age of Onset    High Cholesterol Father     Migraines Sister     Thyroid Disease Sister      Social History     Tobacco Use    Smoking status: Never Smoker    Smokeless tobacco: Never Used   Substance Use Topics    Alcohol use: Yes        Review of Systems   Constitutional: Negative for chills and fever. HENT: Negative for hearing loss and tinnitus. Eyes: Negative for blurred vision and double vision. Respiratory: Negative for cough and shortness of breath. Cardiovascular: Negative for chest pain and palpitations. Gastrointestinal: Negative for nausea and vomiting. Genitourinary: Negative for dysuria and frequency. Musculoskeletal: Negative for back pain and falls. Skin: Negative for itching and rash. Neurological: Negative for dizziness, loss of consciousness and headaches. Endo/Heme/Allergies: Negative. Psychiatric/Behavioral: Negative for depression. The patient is not nervous/anxious. Physical Exam  Vitals signs reviewed. Constitutional:       Appearance: Normal appearance. HENT:      Head: Normocephalic and atraumatic. Right Ear: Tympanic membrane, ear canal and external ear normal.      Left Ear: Tympanic membrane, ear canal and external ear normal.      Nose: Nose normal.      Mouth/Throat:      Mouth: Mucous membranes are moist.      Pharynx: Oropharynx is clear. Eyes:      Extraocular Movements: Extraocular movements intact.       Conjunctiva/sclera: Conjunctivae normal.      Pupils: Pupils are equal, round, and reactive to light. Neck:      Musculoskeletal: Normal range of motion and neck supple. Cardiovascular:      Rate and Rhythm: Normal rate and regular rhythm. Pulses: Normal pulses. Heart sounds: Normal heart sounds. Pulmonary:      Effort: Pulmonary effort is normal.      Breath sounds: Normal breath sounds. Abdominal:      General: Abdomen is flat. Bowel sounds are normal.      Palpations: Abdomen is soft. Genitourinary:     Comments: Slightly enlarged, smooth, asymetrical (right side larger than left) with no nodules; guaiac negative. Musculoskeletal: Normal range of motion. Skin:     General: Skin is warm and dry. Neurological:      General: No focal deficit present. Mental Status: He is alert and oriented to person, place, and time. Psychiatric:         Mood and Affect: Mood normal.         Behavior: Behavior normal.         Judgment: Judgment normal.           ASSESSMENT and PLAN  Diagnoses and all orders for this visit:    1. Physical exam  Pt presents today for a CPE, which I performed. All findings were normal.   -     METABOLIC PANEL, COMPREHENSIVE; Future  -     CBC WITH AUTOMATED DIFF; Future  -     LIPID PANEL; Future  -     TSH 3RD GENERATION; Future  -     T4, FREE; Future  -     PSA W/ REFLX FREE PSA; Future  -     URINALYSIS W/MICROSCOPIC; Future  -     VITAMIN D, 25 HYDROXY; Future    2. Mixed hyperlipidemia   Lipid panel on 12/11/2019 notable for total cholesterol 180, HDL 48, , and triglycerides 91. Pt continues with Lipitor 10 mg/day. -     LIPID PANEL; Future    3. Acquired hypothyroidism  Pt's TSH level was 1.620 on 12/11/2019. Pt continues with Unithroid 137 mcg/day. -     TSH 3RD GENERATION; Future  -     T4, FREE; Future    4. Attention deficit disorder (ADD) without hyperactivity  Pt requests a refill of their medication, which I have granted.  The Prescription Monitoring Program has been reviewed for recent activity regarding controlled substances for this patient. -     lisdexamfetamine (Vyvanse) 30 mg capsule; Take 1 Cap by mouth two (2) times a day. Max Daily Amount: 60 mg.  -     lisdexamfetamine (Vyvanse) 30 mg capsule; Take 1 Cap by mouth two (2) times a day. Max Daily Amount: 60 mg.  -     lisdexamfetamine (Vyvanse) 30 mg capsule; Take 1 Cap by mouth two (2) times a day. Max Daily Amount: 60 mg.    5. Needs flu shot  Pt requests to receive their  flu shot during today's OV;  I will have one of my nurses administer it.   -     INFLUENZA VIRUS VAC QUAD,SPLIT,PRESV FREE SYRINGE IM    6. Vitamin D deficiency  Pt's vitamin D levels were 44.3 on 9/20/2018. Pt continues with cholecalciferol 2,000 units/day. -     VITAMIN D, 25 HYDROXY; Future    7. Encounter for immunization  Provided pt with prescription and will follow up with local pharmacy for Shingrix vaccine. -     varicella-zoster recombinant, PF, (Shingrix, PF,) 50 mcg/0.5 mL susr injection; 0.5 mL by IntraMUSCular route once for 1 dose. Follow-up and Dispositions    · Return in about 3 months (around 3/14/2021) for ADD. Medication risks/benefits/costs/interactions/alternatives discussed with patient. Advised patient to call back or return to office if symptoms worsen/change/persist.  If patient cannot reach us or should anything more severe/urgent arise he/she should proceed directly to the nearest emergency department. Discussed expected course/resolution/complications of diagnosis in detail with patient. Patient given a written after visit summary which includes diagnoses, current medications and vitals. Patient expressed understanding with the diagnosis and plan. Written by meek Harris, as dictated by Jose M Gloria M.D.    9:05 AM - 9:35 AM    Total time spent with the patient 30 minutes, greater than 50% of time spent counseling patient.

## 2020-12-15 LAB
25(OH)D3 SERPL-MCNC: 40.4 NG/ML (ref 30–100)
ALBUMIN SERPL-MCNC: 4.2 G/DL (ref 3.5–5)
ALBUMIN/GLOB SERPL: 1.5 {RATIO} (ref 1.1–2.2)
ALP SERPL-CCNC: 62 U/L (ref 45–117)
ALT SERPL-CCNC: 28 U/L (ref 12–78)
ANION GAP SERPL CALC-SCNC: 4 MMOL/L (ref 5–15)
APPEARANCE UR: CLEAR
AST SERPL-CCNC: 23 U/L (ref 15–37)
BACTERIA URNS QL MICRO: NEGATIVE /HPF
BASOPHILS # BLD: 0.1 K/UL (ref 0–0.1)
BASOPHILS NFR BLD: 1 % (ref 0–1)
BILIRUB SERPL-MCNC: 0.8 MG/DL (ref 0.2–1)
BILIRUB UR QL: NEGATIVE
BUN SERPL-MCNC: 13 MG/DL (ref 6–20)
BUN/CREAT SERPL: 12 (ref 12–20)
CALCIUM SERPL-MCNC: 9 MG/DL (ref 8.5–10.1)
CHLORIDE SERPL-SCNC: 105 MMOL/L (ref 97–108)
CHOLEST SERPL-MCNC: 263 MG/DL
CO2 SERPL-SCNC: 29 MMOL/L (ref 21–32)
COLOR UR: ABNORMAL
CREAT SERPL-MCNC: 1.09 MG/DL (ref 0.7–1.3)
DIFFERENTIAL METHOD BLD: NORMAL
EOSINOPHIL # BLD: 0.2 K/UL (ref 0–0.4)
EOSINOPHIL NFR BLD: 3 % (ref 0–7)
EPITH CASTS URNS QL MICRO: ABNORMAL /LPF
ERYTHROCYTE [DISTWIDTH] IN BLOOD BY AUTOMATED COUNT: 12.5 % (ref 11.5–14.5)
GLOBULIN SER CALC-MCNC: 2.8 G/DL (ref 2–4)
GLUCOSE SERPL-MCNC: 102 MG/DL (ref 65–100)
GLUCOSE UR STRIP.AUTO-MCNC: NEGATIVE MG/DL
HCT VFR BLD AUTO: 42.6 % (ref 36.6–50.3)
HDLC SERPL-MCNC: 66 MG/DL
HDLC SERPL: 4 {RATIO} (ref 0–5)
HGB BLD-MCNC: 14 G/DL (ref 12.1–17)
HGB UR QL STRIP: NEGATIVE
HYALINE CASTS URNS QL MICRO: ABNORMAL /LPF (ref 0–5)
IMM GRANULOCYTES # BLD AUTO: 0 K/UL (ref 0–0.04)
IMM GRANULOCYTES NFR BLD AUTO: 0 % (ref 0–0.5)
KETONES UR QL STRIP.AUTO: NEGATIVE MG/DL
LDLC SERPL CALC-MCNC: 178.8 MG/DL (ref 0–100)
LEUKOCYTE ESTERASE UR QL STRIP.AUTO: ABNORMAL
LIPID PROFILE,FLP: ABNORMAL
LYMPHOCYTES # BLD: 1.5 K/UL (ref 0.8–3.5)
LYMPHOCYTES NFR BLD: 28 % (ref 12–49)
MCH RBC QN AUTO: 30.8 PG (ref 26–34)
MCHC RBC AUTO-ENTMCNC: 32.9 G/DL (ref 30–36.5)
MCV RBC AUTO: 93.8 FL (ref 80–99)
MONOCYTES # BLD: 0.4 K/UL (ref 0–1)
MONOCYTES NFR BLD: 8 % (ref 5–13)
NEUTS SEG # BLD: 3.2 K/UL (ref 1.8–8)
NEUTS SEG NFR BLD: 60 % (ref 32–75)
NITRITE UR QL STRIP.AUTO: NEGATIVE
NRBC # BLD: 0 K/UL (ref 0–0.01)
NRBC BLD-RTO: 0 PER 100 WBC
PH UR STRIP: 7 [PH] (ref 5–8)
PLATELET # BLD AUTO: 220 K/UL (ref 150–400)
PMV BLD AUTO: 10.7 FL (ref 8.9–12.9)
POTASSIUM SERPL-SCNC: 4.4 MMOL/L (ref 3.5–5.1)
PROT SERPL-MCNC: 7 G/DL (ref 6.4–8.2)
PROT UR STRIP-MCNC: NEGATIVE MG/DL
PSA SERPL-MCNC: 2.8 NG/ML (ref 0–4)
RBC # BLD AUTO: 4.54 M/UL (ref 4.1–5.7)
RBC #/AREA URNS HPF: ABNORMAL /HPF (ref 0–5)
REFLEX CRITERIA: NORMAL
SODIUM SERPL-SCNC: 138 MMOL/L (ref 136–145)
SP GR UR REFRACTOMETRY: 1.01 (ref 1–1.03)
T4 FREE SERPL-MCNC: 1.2 NG/DL (ref 0.8–1.5)
TRIGL SERPL-MCNC: 91 MG/DL (ref ?–150)
TSH SERPL DL<=0.05 MIU/L-ACNC: 0.88 UIU/ML (ref 0.36–3.74)
UROBILINOGEN UR QL STRIP.AUTO: 0.2 EU/DL (ref 0.2–1)
VLDLC SERPL CALC-MCNC: 18.2 MG/DL
WBC # BLD AUTO: 5.4 K/UL (ref 4.1–11.1)
WBC URNS QL MICRO: ABNORMAL /HPF (ref 0–4)

## 2021-01-03 NOTE — PROGRESS NOTES
Mr. Elvira Dhillon,  Your LDL cholesterol is not acceptable. It is quite elevated as you are already now I am going to advise that we focus on a low-fat diet high-fiber diet lean meats we got a try to increase her physical cardiac activity to 10 Thousand steps a day. I want to recheck the lipid panel again in about 3 months. Please let me know if there is a strong family history of heart disease. This is very important. PSA levels appear slightly elevated but stable I would not I want to recheck this again in about 6 months. Thyroid function is stable    Complete blood count is stable    Renal and liver function is stable too.     If you have any questions please let me know

## 2021-03-04 ENCOUNTER — TELEPHONE (OUTPATIENT)
Dept: FAMILY MEDICINE CLINIC | Age: 51
End: 2021-03-04

## 2021-03-16 ENCOUNTER — OFFICE VISIT (OUTPATIENT)
Dept: FAMILY MEDICINE CLINIC | Age: 51
End: 2021-03-16
Payer: COMMERCIAL

## 2021-03-16 VITALS
WEIGHT: 203 LBS | RESPIRATION RATE: 16 BRPM | SYSTOLIC BLOOD PRESSURE: 102 MMHG | DIASTOLIC BLOOD PRESSURE: 68 MMHG | TEMPERATURE: 97.8 F | HEIGHT: 74 IN | BODY MASS INDEX: 26.05 KG/M2 | HEART RATE: 71 BPM | OXYGEN SATURATION: 99 %

## 2021-03-16 DIAGNOSIS — M25.512 CHRONIC LEFT SHOULDER PAIN: ICD-10-CM

## 2021-03-16 DIAGNOSIS — F98.8 ATTENTION DEFICIT DISORDER (ADD) WITHOUT HYPERACTIVITY: ICD-10-CM

## 2021-03-16 DIAGNOSIS — G89.29 CHRONIC LEFT SHOULDER PAIN: ICD-10-CM

## 2021-03-16 DIAGNOSIS — E78.2 MIXED HYPERLIPIDEMIA: Primary | ICD-10-CM

## 2021-03-16 PROCEDURE — 99214 OFFICE O/P EST MOD 30 MIN: CPT | Performed by: FAMILY MEDICINE

## 2021-03-16 RX ORDER — ROSUVASTATIN CALCIUM 5 MG/1
5 TABLET, COATED ORAL
Qty: 30 TAB | Refills: 5 | Status: SHIPPED | OUTPATIENT
Start: 2021-03-16 | End: 2021-08-15

## 2021-03-16 NOTE — PROGRESS NOTES
Chief Complaint   Patient presents with    Thyroid Problem     1. Have you been to the ER, urgent care clinic since your last visit? Hospitalized since your last visit? no    2. Have you seen or consulted any other health care providers outside of the [address] [place] [address] since your last visit? Include any pap smears or colon screening.  no'

## 2021-03-16 NOTE — PROGRESS NOTES
HPI  Presley Hdez 48 y.o. male  presents to the office today for a thyroid problem. Blood pressure 102/68, pulse 71, temperature 97.8 °F (36.6 °C), temperature source Temporal, resp. rate 16, height 6' 2\" (1.88 m), weight 203 lb (92.1 kg), SpO2 99 %. Body mass index is 26.06 kg/m². Chief Complaint   Patient presents with    Thyroid Problem        Hypothyroidism: Pt's TSH level was 0.88 on 12/14/2020. Pt continues with Unithroid 137 mcg/day. Hyperlipidemia: Lipid panel on 12/14/2020 notable for total cholesterol 263, HDL 66, .8, and triglycerides 91. Pt informs me that he stopped taking his prescription for Lipitor 10 mg/day around Thanksgiving as it caused him to experience joint pain and a mental fog. During today's OV, I instead prescribed pt Crestor  5 mg/day. ADD: Stable. Pt continues with Vyvanse 30 mg/BID. Pt requests a refill of their medication, which I have granted. Pt informs me that he injured his left shoulder when working out several years ago. A few months ago, while changing a light fixture, pt re-injured the shoulder. A physical examination was performed. I have provided pt with a referral to physical therapy and to Dr. Jemma Correa, sports med, for further care and evaluation of the injury. Current Outpatient Medications   Medication Sig Dispense Refill    rosuvastatin (CRESTOR) 5 mg tablet Take 1 Tab by mouth nightly. 30 Tab 5    lisdexamfetamine (Vyvanse) 30 mg capsule Take 1 Cap by mouth two (2) times a day. Max Daily Amount: 60 mg. 60 Cap 0    [START ON 4/16/2021] lisdexamfetamine (Vyvanse) 30 mg capsule 1 capsule twice daily 60 Cap 0    [START ON 5/16/2021] lisdexamfetamine (Vyvanse) 30 mg capsule 1 cap twice daily 60 Cap 0    multivitamin (ONE A DAY) tablet Take 1 Tab by mouth daily.  levothyroxine (UNITHROID) 137 mcg tablet Take 137 mcg by mouth Daily (before breakfast).  90 Tab 1    Cholecalciferol, Vitamin D3, (VITAMIN D3) 2,000 unit cap capsule Take  by mouth daily. Allergies   Allergen Reactions    Soy Anaphylaxis     History reviewed. No pertinent past medical history. Past Surgical History:   Procedure Laterality Date    HX VASECTOMY       Family History   Problem Relation Age of Onset    High Cholesterol Father     Migraines Sister     Thyroid Disease Sister      Social History     Tobacco Use    Smoking status: Never Smoker    Smokeless tobacco: Never Used   Substance Use Topics    Alcohol use: Yes        Review of Systems   Constitutional: Negative for chills and fever. HENT: Negative for hearing loss and tinnitus. Eyes: Negative for blurred vision and double vision. Respiratory: Negative for cough and shortness of breath. Cardiovascular: Negative for chest pain and palpitations. Gastrointestinal: Negative for nausea and vomiting. Genitourinary: Negative for dysuria and frequency. Musculoskeletal: Negative for back pain and falls. Skin: Negative for itching and rash. Neurological: Negative for dizziness, loss of consciousness and headaches. Endo/Heme/Allergies: Negative. Psychiatric/Behavioral: Negative for depression. The patient is not nervous/anxious. Physical Exam  Vitals signs reviewed. Constitutional:       Appearance: Normal appearance. HENT:      Head: Normocephalic and atraumatic. Right Ear: Tympanic membrane, ear canal and external ear normal.      Left Ear: Tympanic membrane, ear canal and external ear normal.      Nose: Nose normal.      Mouth/Throat:      Mouth: Mucous membranes are moist.      Pharynx: Oropharynx is clear. Eyes:      Extraocular Movements: Extraocular movements intact. Conjunctiva/sclera: Conjunctivae normal.      Pupils: Pupils are equal, round, and reactive to light. Neck:      Musculoskeletal: Normal range of motion and neck supple. Cardiovascular:      Rate and Rhythm: Normal rate and regular rhythm. Pulses: Normal pulses.       Heart sounds: Normal heart sounds. Pulmonary:      Effort: Pulmonary effort is normal.      Breath sounds: Normal breath sounds. Abdominal:      General: Abdomen is flat. Bowel sounds are normal.      Palpations: Abdomen is soft. Musculoskeletal:      Left shoulder: He exhibits pain. He exhibits no tenderness. Arms:    Skin:     General: Skin is warm and dry. Neurological:      General: No focal deficit present. Mental Status: He is alert and oriented to person, place, and time. Psychiatric:         Mood and Affect: Mood normal.         Behavior: Behavior normal.         Judgment: Judgment normal.           ASSESSMENT and PLAN  Diagnoses and all orders for this visit:    1. Mixed hyperlipidemia  Lipid panel on 12/14/2020 notable for total cholesterol 263, HDL 66, .8, and triglycerides 91. Pt informs me that he stopped taking his prescription for Lipitor 10 mg/day around Thanksgiving as it caused him to experience joint pain and a mental fog. During today's OV, I instead prescribed pt Crestor  5 mg/day. -     rosuvastatin (CRESTOR) 5 mg tablet; Take 1 Tab by mouth nightly. 2. Attention deficit disorder (ADD) without hyperactivity  Pt requests a refill of their medication, which I have granted. -     lisdexamfetamine (Vyvanse) 30 mg capsule; Take 1 Cap by mouth two (2) times a day. Max Daily Amount: 60 mg.  -     lisdexamfetamine (Vyvanse) 30 mg capsule; 1 capsule twice daily  -     lisdexamfetamine (Vyvanse) 30 mg capsule; 1 cap twice daily    3. Chronic left shoulder pain  Pt informs me that he injured his left shoulder when working out several years ago. A few months ago, while changing a light fixture, pt re-injured the shoulder. A physical examination was performed.  I have provided pt with a referral to physical therapy and to Dr. Isaac Harrison, sports med, for further care and evaluation of the injury.   -     REFERRAL TO PHYSICAL THERAPY  -     REFERRAL TO SPORTS MEDICINE        Follow-up and Dispositions · Return in about 3 months (around 6/16/2021) for ADD. Medication risks/benefits/costs/interactions/alternatives discussed with patient. Advised patient to call back or return to office if symptoms worsen/change/persist.  If patient cannot reach us or should anything more severe/urgent arise he/she should proceed directly to the nearest emergency department. Discussed expected course/resolution/complications of diagnosis in detail with patient. Patient given a written after visit summary which includes diagnoses, current medications and vitals. Patient expressed understanding with the diagnosis and plan. Written by meek Dumont, as dictated by Atilio Vogt M.D.    9:49 AM - 10:04 AM    Total time spent with the patient 15 minutes, greater than 50% of time spent counseling patient.

## 2021-03-23 ENCOUNTER — HOSPITAL ENCOUNTER (OUTPATIENT)
Dept: PHYSICAL THERAPY | Age: 51
Discharge: HOME | End: 2021-03-23
Payer: COMMERCIAL

## 2021-03-23 PROCEDURE — 97110 THERAPEUTIC EXERCISES: CPT | Performed by: PHYSICAL THERAPIST

## 2021-03-23 PROCEDURE — 97161 PT EVAL LOW COMPLEX 20 MIN: CPT | Performed by: PHYSICAL THERAPIST

## 2021-03-23 NOTE — PROGRESS NOTES
PT INITIAL EVALUATION NOTE - [facility] 2-15    Patient Name: Jason Vee  Date:3/23/2021  : 1970  [x]  Patient  Verified  Payor: [employer]UE [employer] / Plan: Cassius Rankin [id number] PPO / Product Type: PPO /    In time:750 AM  Out time: 835 AM  Total Treatment Time (min): 45  Total Timed Codes (min): 25  1:1 Treatment Time ( only): 25   Visit #: 1     Treatment Area: Pain in left shoulder [M25.512]    SUBJECTIVE  Pain Level (0-10 scale): current 1 worst 7   Any medication changes, allergies to medications, adverse drug reactions, diagnosis change, or new procedure performed?: [] No    [x] Yes (see summary sheet for update)  Subjective:    Patient referred to PT with a chief complaint of L shoulder pain. In January or February he was reaching up and changing light bulbs and he had to catch the light fixture. He had to reach across his body to catch it. States that his L shoulder popped out and popped back in. He has a history of L shoulder pain which began in  when he was doing decline press and injured L shoulder. States that it felt like he tore something. He was able to work through it without significant pain and compensate through his own weight lifting and never sought treatment for it. States that off and on since  his L shoulder would get stuck with taking off shirt otherwise has been fine. His chief complaint is night pain with sleeping on his L shoulder. He wakes 3-4 times a week with L shoulder pain. Pain Location: posterior L shoulder   Pain Description: ache  Aggravating Factors: sleeping on L shoulder, lifting hand overhead with weights, reaching across body    Relieving Factors: rest, relaxation   Current Functional Limitations: Pain with sleeping on L shoulder, pain with reaching overhead, pain with work around the house, plans to paddle board and kayak this summer   PLOF: Able to kayak, paddle board and lift overhead without pain, knew it was there.   Mechanism of Injury: changing light Previous Treatment/Compliance: none, previous PT for L periscapular pain   PMHx/Surgical Hx: Thyroid disease  Work Hx: Desk job, working from home   Living Situation: Lives with wife and 16year old son   Pt Goals: \"A little more confidence in it\"   Barriers: none   Motivation: good     OBJECTIVE/EXAMINATION  Observation: forward head, rounded shoulders    ROM:   Cervical AROM: WFL     Shoulder AROM:   R Flexion: 170 degrees  R Abduction: 175 degrees  R ER: 70 degrees  R IR: level of T10    L Flexion: 165 degrees stretch   L Abduction: 150 degrees stretch  L ER: 60 degrees   L IR: level of T10     Shoulder PROM:   R Flexion: 175 degrees  R Abduction: 175 degrees  R ER: 90 degrees    L Flexion: 165 degrees, guarding  L Abduction: 160 degrees, guarding   L ER: 80 degrees    Strength:   R Shoulder flexion 5/5  R Shoulder abduction 5/5  R shoulder ER 5/5  R shoulder IR 5/5    L Shoulder flexion 4+/5  L Shoulder abduction 4+/5  L shoulder ER 5/5  L shoulder IR 5/5    Palpation: Tender to palpation and myofascial trigger points infraspinatus, teres minor    Joint mobility: hypermobile inferior GH glides      Special tests: Neer's +, drop arm -, apprehension -       5 min Modality:[x]  Ice L shoulder []  Heat  []  Ice massage   Rationale: decrease pain to improve the patients ability to reach and lift     [x] Skin assessment post-treatment:  [x]intact []redness- no adverse reaction    []redness  adverse reaction:   25 min Therapeutic Exercise:  [x] See flow sheet : taught SA punch, S/L ER, TB row and extension green    Rationale: increase ROM and increase strength to improve the patients ability to reach and lift               With   [x] TE   [] TA   [] neuro   [] other: Patient Education: [x] Review HEP    [] Progressed/Changed HEP based on:   [] positioning   [] body mechanics   [] transfers   [] heat/ice application    [x] other: role of PT, expected course of PT     Other Objective/Functional Measures:FOTO score 63/100    Pain Level (0-10 scale) post treatment: 1    ASSESSMENT/Changes in Function:     [x]  See Plan of [address], PT 3/23/2021  7:49 AM

## 2021-03-23 NOTE — PROGRESS NOTES
[facility] Physical Therapy  [address] [facility], [address] [place] [address]  Phone: [phone]  Fax: [phone]    Plan of Care/Statement of Necessity for Physical Therapy Services  2-15    Patient name: Dakotah Albrecht  : 1970  Provider#: [id number]  Referral source: Collin Baig MD      Medical/Treatment Diagnosis: Pain in left shoulder [M25.512]     Prior Hospitalization: see medical history     Comorbidities: Thyroid disease  Prior Level of Function: Able to kayak, paddle board and lift overhead without pain, knew it was there. Medications: Verified on Patient Summary List    Start of Care: 3/23/21      Onset Date: 2021       The Plan of Care and following information is based on the information from the initial evaluation. Assessment/ key information: Patient presents with L shoulder pain with postural dysfunction, decreased strength and ROM limiting ability to perform functional activities such as lifting and reaching. He would benefit from skilled PT to increase L shoulder ROM and strength and improve posture so he can return to prior level of function.      Evaluation Complexity History LOW Complexity : Zero comorbidities / personal factors that will impact the outcome / POC; Examination LOW Complexity : 1-2 Standardized tests and measures addressing body structure, function, activity limitation and / or participation in recreation  ;Presentation LOW Complexity : Stable, uncomplicated  ;Clinical Decision Making MEDIUM Complexity : FOTO score of 26-74  Overall Complexity Rating: LOW     Problem List: pain affecting function, decrease ROM, decrease strength, decrease ADL/ functional abilitiies and decrease activity tolerance   Treatment Plan may include any combination of the following: Therapeutic exercise, Therapeutic activities, Manual therapy and Patient education  Patient / Family readiness to learn indicated by: asking questions  Persons(s) to be included in education: patient (P)  Barriers to Learning/Limitations: None  Patient Goal (s): A little more confidence in it  Patient Self Reported Health Status: good  Rehabilitation Potential: good    Short Term Goals: To be accomplished in 2 weeks:    1. Patient will be I in HEP to promote self management of symptoms. 2. Patient will report pain level at worst as less than or equal to 5/10 so they can perform ADLs without pain. Long Term Goals: To be accomplished in 4-6 weeks:    1. Patient will report pain level at worst as less than or equal to 3/10 so they can perform ADLs without pain. 2. Patient will have L shoulder AROM = R shoulder AROM so he can reach overhead. 3. Patient will have L shoulder strength > or = R shoulder strength so he can lift without pain. Frequency / Duration: Patient to be seen 2 times per week for 4 weeks. Patient/ Caregiver education and instruction: exercises    [x]  Plan of care has been reviewed with DENISSE De La Cruz, PT 3/23/2021 8:50 AM    ________________________________________________________________________    I certify that the above Therapy Services are being furnished while the patient is under my care. I agree with the treatment plan and certify that this therapy is necessary.     [facility] Signature:____________________  Date:____________Time: _________

## 2021-03-24 ENCOUNTER — OFFICE VISIT (OUTPATIENT)
Dept: INTERNAL MEDICINE CLINIC | Age: 51
End: 2021-03-24
Payer: COMMERCIAL

## 2021-03-24 VITALS
HEART RATE: 71 BPM | RESPIRATION RATE: 16 BRPM | OXYGEN SATURATION: 100 % | HEIGHT: 74 IN | BODY MASS INDEX: 26.56 KG/M2 | WEIGHT: 207 LBS | TEMPERATURE: 98.5 F | DIASTOLIC BLOOD PRESSURE: 78 MMHG | SYSTOLIC BLOOD PRESSURE: 118 MMHG

## 2021-03-24 DIAGNOSIS — S43.005A DISLOCATION OF LEFT SHOULDER JOINT, INITIAL ENCOUNTER: Primary | ICD-10-CM

## 2021-03-24 DIAGNOSIS — M25.512 ACUTE PAIN OF LEFT SHOULDER: ICD-10-CM

## 2021-03-24 DIAGNOSIS — M75.82 ROTATOR CUFF TENDONITIS, LEFT: ICD-10-CM

## 2021-03-24 PROCEDURE — 99214 OFFICE O/P EST MOD 30 MIN: CPT | Performed by: FAMILY MEDICINE

## 2021-03-24 RX ORDER — NAPROXEN 500 MG/1
500 TABLET ORAL 2 TIMES DAILY WITH MEALS
Qty: 30 TAB | Refills: 0 | Status: SHIPPED | OUTPATIENT
Start: 2021-03-24 | End: 2021-06-08

## 2021-03-24 NOTE — PROGRESS NOTES
Chief Complaint   Patient presents with    Shoulder Pain     Patient is here for a left shoulder pain      he is a 48y.o. year old male who presents for evaluation of left shoulder   Pain Assessment Encounter      Leigh Pelayo  3/24/2021  Onset of Symptoms: Patient is he has had pain for a month  ________________________________________________________________________  Description:Patient states he was lifting and dislocated shoulder     Frequency: 4 times a day  Pain Scale:(1-10): 2  Trauma Hx: none   Hx of similar symptoms: Yes  Radiation: NO,   Duration:  intermittent      Progression: has worsened  What makes it better?: heat, ice and OTC meds  What makes it worse?:exercise and strecthing  Medications tried: ibuprofen, aspirin    Reviewed and agree with Nurse Note and duplicated in this note. Reviewed PmHx, RxHx, FmHx, SocHx, AllgHx and updated and dated in the chart. Family History   Problem Relation Age of Onset    High Cholesterol Father    [facility] Migraines Sister     Thyroid Disease Sister      No past medical history on file. Social History     Socioeconomic History    Marital status:      Spouse name: Not on file    Number of children: Not on file    Years of education: Not on file    Highest education level: Not on file   Tobacco Use    Smoking status: Never Smoker    Smokeless tobacco: Never Used   Substance and Sexual Activity    Alcohol use:  Yes    Drug use: No    Sexual activity: Yes     Partners: Female     Birth control/protection: Surgical        Review of Systems - negative except as listed above      Objective:     Vitals:    03/24/21 1055   BP: 118/78   Pulse: 71   Resp: 16   Temp: 98.5 °F (36.9 °C)   SpO2: 100%   Weight: 207 lb (93.9 kg)   Height: 6' 2\" (1.88 m)       Physical Examination: General appearance - alert, well appearing, and in no distress  Back exam - full range of motion, no tenderness, palpable spasm or pain on motion  Neurological - alert, oriented, normal speech, no focal findings or movement disorder noted  Musculoskeletal - The left shoulder is  normal to inspection. The patient has full range of motion  . The shoulderis not tender to palpation . Bicep tendon: non-tender  The NEER test is positive. The Arias test:is positive   The Cross over test:is  negative. The Empty Can test:is  negative. Stressed ext rotation is:  negative. Stressed int rotation: negative. The Apprehension Sign is negative. The Lift off test is:  negative   Examination reveals the Painful Arch:  negative. The Labral Test is:  negative. The Sulcus Sign is:  negative.     Extremities - peripheral pulses normal, no pedal edema, no clubbing or cyanosis  Skin - normal coloration and turgor, no rashes, no suspicious skin lesions noted   Indications for study: left shoulder pain    A limited  musculoskeletal ultrasound examination was performed on the left shoulder with the following findings: Biceps (LHB) tendon - long:  normal echogenic, linearly compact fibrillar appearance   Biceps (LHB) tendon - trans:  normal echogenic, tessellate compact fibrillar appearance   Subscapularis tendon - long: normal echogenic, linearly compact fibrillar birds-beak appearance   Subscapularis tendon - trans:  normal echogenic, tessellate compact fibrillar three-bundled appearance   Acromioclavicular joint - trans: normal joint-space without effusion or intra-articular debris   Subacromial (SA) impingement testing: Decreased maintenance of SA space and restricted supraspinatus glide  Supraspinatus tendon - long: normal echogenic with calcific debris noted at insertion, linearly compact fibrillar birds-beak appearance   Infraspinatus tendon - long: normal echogenic, linearly compact fibrillar birds-beak appearance   Teres minor tendon - long: normal echogenic, linearly compact fibrillar birds-beak appearance   Supraspinatus/Infraspinatus/Teres minor tendons - trans: normal echogenic with some areas of hypoechogenic signal in all 3 tendons, tessellate compact fibrillar wagon-wheeled appearance     Impression: Rotator cuff tendinitis with subacromial impingement    Scanned and Interpreted by Heena Ahn MD [facility]        Assessment/ Plan:   Diagnoses and all orders for this visit:    1. Dislocation of left shoulder joint, initial encounter  -     AMB POC US,EXTREMITY,NONVASCULAR,REAL-TIME IMAGE,LIMITED    2. Rotator cuff tendonitis, left  -     AMB POC US,EXTREMITY,NONVASCULAR,REAL-TIME IMAGE,LIMITED    3. Acute pain of left shoulder  -     XR SHOULDER LT AP/LAT MIN 2 V; Future    Other orders  -     naproxen (NAPROSYN) 500 mg tablet; Take 1 Tab by mouth two (2) times daily (with meals). Will start with home physical therapy and if no resolution we will consider official PT  Pathophysiology, recovery and rehabilitation process discussed and questions answered   Counseling for 30 Minutes of the total visit duration   Pictures and figures used as necessary   Provided reassurance   Shoulder Tubing Exercises reviewed and Theraband provided  Handouts provided and reviewed with patient for shoulder tubing  Monitor response to home exercises   Recommend  lower impact activities-walking, Eliptical, Nordic Track, cycling or swimming   Follow up in 4 week(s)             1) Remember to stay active and/or exercise regularly (I suggest 30-45 minutes daily)   2) For reliable dietary information, go to www. EATRIGHT.org. You may wish to consider seeing the nutritionist at [facility] [phone], also consider the [address] I have discussed the diagnosis with the patient and the intended plan as seen in the above orders. The patient has received an after-visit summary and questions were answered concerning future plans.      Medication Side Effects and Warnings were discussed with patient,  Patient Labs were reviewed and or requested, and  Patient Past Records were reviewed and or requested yes      Pt agrees to call or return to clinic and/or go to closest ER with any worsening of symptoms. This may include, but not limited to increased fever (>100.4) with NSAIDS or Tylenol, increased edema, confusion, rash, worsening of presenting symptoms. Please note that this dictation was completed with [employer], the computer voice recognition software. Quite often unanticipated grammatical, syntax, homophones, and other interpretive errors are inadvertently transcribed by the computer software. Please disregard these errors. Please excuse any errors that have escaped final proofreading. Thank you.

## 2021-03-25 ENCOUNTER — HOSPITAL ENCOUNTER (OUTPATIENT)
Dept: PHYSICAL THERAPY | Age: 51
Discharge: HOME | End: 2021-03-25
Payer: COMMERCIAL

## 2021-03-25 PROCEDURE — 97110 THERAPEUTIC EXERCISES: CPT | Performed by: PHYSICAL THERAPY ASSISTANT

## 2021-03-25 PROCEDURE — 97140 MANUAL THERAPY 1/> REGIONS: CPT | Performed by: PHYSICAL THERAPY ASSISTANT

## 2021-03-25 NOTE — PROGRESS NOTES
PT DAILY TREATMENT NOTE - [facility] -15    Patient Name: Jd Alexis  Date:3/25/2021  : 1970  [x]  Patient  Verified  Payor: BLUE CROSS / Plan: [employer] [id number] PPO / Product Type: PPO /    In time:7:45 AM  Out time:8:40 AM  Total Treatment Time (min): 55  Total Timed Codes (min): 45  1:1 Treatment Time (MC only): 45   Visit #:  2    Treatment Area: Pain in left shoulder [M25.512]    SUBJECTIVE  Pain Level (0-10 scale): 2-3/10  Any medication changes, allergies to medications, adverse drug reactions, diagnosis change, or new procedure performed?: [x] No    [] Yes (see summary sheet for update)  Subjective functional status/changes:   [] No changes reported  Patient reports he saw Dr. Karlos Webb yesterday, had a diagnostic ultrasound performed and was told tendinitis of RTC. States when he took his shirt off his shoulder felt like it popped out of socket so he has been relatively sore since then. He was prescribed anti-inflammatories which he is picking up after PT today.     OBJECTIVE    Modality rationale: decrease inflammation and decrease pain to improve the patients ability to reach, lift, perform ADL's   Min Type Additional Details       [] Estim: []Att   []Unatt    []TENS instruct                  []IFC  []Premod   []NMES                     []Other:  []w/US   []w/ice   []w/heat  Position:  Location:       []  Traction: [] Cervical       []Lumbar                       [] Prone          []Supine                       []Intermittent   []Continuous Lbs:  [] before manual  [] after manual  []w/heat    []  Ultrasound: []Continuous   [] Pulsed                       at: []1MHz   []3MHz Location:  W/cm2:    [] Paraffin         Location:   []w/heat   10 [x]  Ice     []  Heat  []  Ice massage Position: sitting  [facility]    []  Laser  []  Other: Position:  Location:      []  Vasopneumatic Device Pressure:       [] lo [] med [] hi   Temperature:      [x] Skin assessment post-treatment:  [x]intact []redness- no adverse reaction    []redness  adverse reaction:     30 min Therapeutic Exercise:  [x] See flow sheet : reviewed HEP, added per flow sheet   Rationale: increase ROM, increase strength and improve coordination to improve the patients ability to reach, lift and perform ADL's    15 min Manual Therapy:   Rhythmic stabilization L shoulder at 90 and 120 degrees flexion, IR/ER at 45 degrees scaption, STM L infraspinatus, teres mary lou/min, middle and low trap, s/l scap mobs into posterior tilt/downward rotation     Rationale: decrease pain, increase ROM, increase tissue extensibility and decrease trigger points to improve the patients ability to reach, lift, and perform ADL's            With   [] TE   [] TA   [] Neuro   [] SC   [] other: Patient Education: [x] Review HEP    [] Progressed/Changed HEP based on:   [] positioning   [] body mechanics   [] transfers   [] heat/ice application    [] other:      Other Objective/Functional Measures: TTP L infraspinatus, teres mary lou/min and middle and lower trap muscles     Pain Level (0-10 scale) post treatment: 2-3/10    ASSESSMENT/Changes in Function:   Muscle fatigue and soreness noted after new exercises otherwise tolerated session well. Patient will continue to benefit from skilled PT services to modify and progress therapeutic interventions, address functional mobility deficits, address ROM deficits, address strength deficits, analyze and address soft tissue restrictions, analyze and cue movement patterns, analyze and modify body mechanics/ergonomics and instruct in home and community integration to attain remaining goals.      []  See Plan of Care  []  See progress note/recertification  []  See Discharge Summary         Progress towards goals / Updated goals:  nt    PLAN  []  Upgrade activities as tolerated     [x]  Continue plan of care  []  Update interventions per flow sheet       []  Discharge due to:_  []  Other:_      Thyra Raw, PTA 3/25/2021

## 2021-03-30 ENCOUNTER — HOSPITAL ENCOUNTER (OUTPATIENT)
Dept: PHYSICAL THERAPY | Age: 51
Discharge: HOME | End: 2021-03-30
Payer: COMMERCIAL

## 2021-03-30 PROCEDURE — 97110 THERAPEUTIC EXERCISES: CPT | Performed by: PHYSICAL THERAPIST

## 2021-03-30 PROCEDURE — 97140 MANUAL THERAPY 1/> REGIONS: CPT | Performed by: PHYSICAL THERAPIST

## 2021-03-30 NOTE — PROGRESS NOTES
PT DAILY TREATMENT NOTE - [facility] 2-15    Patient Name: Shwetha Antonio  Date:3/30/2021  : 1970  [x]  Patient  Verified  Payor: BLUE CROSS / Plan: [employer] [id number] PPO / Product Type: PPO /    In time:700 AM  Out time:8:55 AM  Total Treatment Time (min): 55  Total Timed Codes (min): 45  1:1 Treatment Time ( only): 45   Visit #:  3    Treatment Area: Pain in left shoulder [M25.512]    SUBJECTIVE  Pain Level (0-10 scale): 1  Any medication changes, allergies to medications, adverse drug reactions, diagnosis change, or new procedure performed?: [x] No    [] Yes (see summary sheet for update)  Subjective functional status/changes:   [] No changes reported  Patient reports that his shoulder is feeling \"good. \" His shoulder felt great after last session, he was actually able to sleep on it.      OBJECTIVE    Modality rationale: decrease inflammation and decrease pain to improve the patients ability to reach, lift, perform ADL's   Min Type Additional Details       [] Estim: []Att   []Unatt    []TENS instruct                  []IFC  []Premod   []NMES                     []Other:  []w/US   []w/ice   []w/heat  Position:  Location:       []  Traction: [] Cervical       []Lumbar                       [] Prone          []Supine                       []Intermittent   []Continuous Lbs:  [] before manual  [] after manual  []w/heat    []  Ultrasound: []Continuous   [] Pulsed                       at: []1MHz   []3MHz Location:  W/cm2:    [] Paraffin         Location:   []w/heat   10 [x]  Ice     []  Heat  []  Ice massage Position: sitting  Location: [facility]    []  Laser  []  Other: Position:  Location:      []  Vasopneumatic Device Pressure:       [] lo [] med [] hi   Temperature:      [x] Skin assessment post-treatment:  [x]intact []redness- no adverse reaction    []redness  adverse reaction:     30 min Therapeutic Exercise:  [x] See flow sheet : reviewed HEP, added per flow sheet   Rationale: increase ROM, increase strength and improve coordination to improve the patients ability to reach, lift and perform ADL's    15 min Manual Therapy:   Rhythmic stabilization L shoulder at 90 and 120 degrees flexion, IR/ER at 45 degrees scaption, STM L infraspinatus, teres mary lou/min, middle and low trap, s/l scap mobs into posterior tilt/downward rotation     Rationale: decrease pain, increase ROM, increase tissue extensibility and decrease trigger points to improve the patients ability to reach, lift, and perform ADL's            With   [] TE   [] TA   [] Neuro   [] SC   [] other: Patient Education: [x] Review HEP    [] Progressed/Changed HEP based on:   [] positioning   [] body mechanics   [] transfers   [] heat/ice application    [] other:      Other Objective/Functional Measures: TTP L infraspinatus, teres mary lou/min and middle and lower trap muscles     Pain Level (0-10 scale) post treatment: 1    ASSESSMENT/Changes in Function:   Patient tolerated exercises and manual therapy well today. Patient will continue to benefit from skilled PT services to modify and progress therapeutic interventions, address functional mobility deficits, address ROM deficits, address strength deficits, analyze and address soft tissue restrictions, analyze and cue movement patterns, analyze and modify body mechanics/ergonomics and instruct in home and community integration to attain remaining goals. Progress towards goals / Updated goals:  Short Term Goals: To be accomplished in 2 weeks:               1. Patient will be I in HEP to promote self management of symptoms. PROGRESSING              2. Patient will report pain level at worst as less than or equal to 5/10 so they can perform ADLs without pain. PROGRESSING  Long Term Goals: To be accomplished in 4-6 weeks:               1.  Patient will report pain level at worst as less than or equal to 3/10 so they can perform ADLs without pain.                2. Patient will have L shoulder AROM = R shoulder AROM so he can reach overhead. 3. Patient will have L shoulder strength > or = R shoulder strength so he can lift without pain.      PLAN  []  Upgrade activities as tolerated     [x]  Continue plan of care  []  Update interventions per flow sheet       []  Discharge due to:_  []  Other:_      Adela Wiggins, PT 3/30/2021

## 2021-04-01 ENCOUNTER — HOSPITAL ENCOUNTER (OUTPATIENT)
Dept: PHYSICAL THERAPY | Age: 51
Discharge: HOME | End: 2021-04-01
Payer: COMMERCIAL

## 2021-04-01 PROCEDURE — 97140 MANUAL THERAPY 1/> REGIONS: CPT | Performed by: PHYSICAL THERAPY ASSISTANT

## 2021-04-01 PROCEDURE — 97110 THERAPEUTIC EXERCISES: CPT | Performed by: PHYSICAL THERAPY ASSISTANT

## 2021-04-01 NOTE — PROGRESS NOTES
PT DAILY TREATMENT NOTE - [facility] 2-15    Patient Name: Tristen Macias  Date:2021  : 1970  [x]  Patient  Verified  Payor: BLUE CROSS / Plan: [facility] PPO / Product Type: PPO /    In time:700 AM  Out time:7:55 AM  Total Treatment Time (min): 55  Total Timed Codes (min): 45  1:1 Treatment Time ( only): 45   Visit #:  4    Treatment Area: Pain in left shoulder [M25.512]    SUBJECTIVE  Pain Level (0-10 scale): 0  Any medication changes, allergies to medications, adverse drug reactions, diagnosis change, or new procedure performed?: [x] No    [] Yes (see summary sheet for update)  Subjective functional status/changes:   [] No changes reported  Patient reports he hasn't had any shoulder pain since last visit. He is trying to focus more on his posture while working at his desk.     OBJECTIVE    Modality rationale: decrease inflammation and decrease pain to improve the patients ability to reach, lift, perform ADL's   Min Type Additional Details       [] Estim: []Att   []Unatt    []TENS instruct                  []IFC  []Premod   []NMES                     []Other:  []w/US   []w/ice   []w/heat  Position:  Location:       []  Traction: [] Cervical       []Lumbar                       [] Prone          []Supine                       []Intermittent   []Continuous Lbs:  [] before manual  [] after manual  []w/heat    []  Ultrasound: []Continuous   [] Pulsed                       at: []1MHz   []3MHz Location:  W/cm2:    [] Paraffin         Location:   []w/heat   10 [x]  Ice     []  Heat  []  Ice massage Position: sitting  [facility]    []  Laser  []  Other: Position:  Location:      []  Vasopneumatic Device Pressure:       [] lo [] med [] hi   Temperature:      [x] Skin assessment post-treatment:  [x]intact []redness- no adverse reaction    []redness  adverse reaction:     30 min Therapeutic Exercise:  [x] See flow sheet : reviewed HEP, added per flow sheet   Rationale: increase ROM, increase strength and improve coordination to improve the patients ability to reach, lift and perform ADL's    15 min Manual Therapy:   Rhythmic stabilization L shoulder at 90 and 120 degrees flexion, IR/ER at 45 degrees scaption, STM L infraspinatus, teres mary lou/min, middle and low trap, s/l scap mobs into posterior tilt/downward rotation     Rationale: decrease pain, increase ROM, increase tissue extensibility and decrease trigger points to improve the patients ability to reach, lift, and perform ADL's            With   [] TE   [] TA   [] Neuro   [] SC   [] other: Patient Education: [x] Review HEP    [] Progressed/Changed HEP based on:   [] positioning   [] body mechanics   [] transfers   [] heat/ice application    [] other:      Other Objective/Functional Measures: NT     Pain Level (0-10 scale) post treatment: 0    ASSESSMENT/Changes in Function:   Decreased TTP along L infraspinatus, teres mm and middle/lower trap today. Progressing well with strength and shoulder stability. Patient will continue to benefit from skilled PT services to modify and progress therapeutic interventions, address functional mobility deficits, address ROM deficits, address strength deficits, analyze and address soft tissue restrictions, analyze and cue movement patterns, analyze and modify body mechanics/ergonomics and instruct in home and community integration to attain remaining goals. Progress towards goals / Updated goals:  Short Term Goals: To be accomplished in 2 weeks:               1. Patient will be I in HEP to promote self management of symptoms. PROGRESSING              2. Patient will report pain level at worst as less than or equal to 5/10 so they can perform ADLs without pain. PROGRESSING  Long Term Goals: To be accomplished in 4-6 weeks:               1.  Patient will report pain level at worst as less than or equal to 3/10 so they can perform ADLs without pain.                2. Patient will have L shoulder AROM = R shoulder AROM so he can reach overhead. 3. Patient will have L shoulder strength > or = R shoulder strength so he can lift without pain.      PLAN  []  Upgrade activities as tolerated     [x]  Continue plan of care  []  Update interventions per flow sheet       []  Discharge due to:_  []  Other:_      Inés Andersen, PTA 4/1/2021

## 2021-04-06 ENCOUNTER — HOSPITAL ENCOUNTER (OUTPATIENT)
Dept: PHYSICAL THERAPY | Age: 51
Discharge: HOME | End: 2021-04-06
Payer: COMMERCIAL

## 2021-04-06 PROCEDURE — 97110 THERAPEUTIC EXERCISES: CPT | Performed by: PHYSICAL THERAPY ASSISTANT

## 2021-04-06 PROCEDURE — 97140 MANUAL THERAPY 1/> REGIONS: CPT | Performed by: PHYSICAL THERAPY ASSISTANT

## 2021-04-06 NOTE — PROGRESS NOTES
PT DAILY TREATMENT NOTE - [facility] 2-15    Patient Name: Rosita Sargent  Date:2021  : 1970  [x]  Patient  Verified  Payor: BLUE CROSS / Plan: [employer] [id number] PPO / Product Type: PPO /    In time: 7:45 AM  Out time: 8:40 AM  Total Treatment Time (min): 55  Total Timed Codes (min): 55  1:1 Treatment Time ( only): 55   Visit #:  5    Treatment Area: Pain in left shoulder [M25.512]    SUBJECTIVE  Pain Level (0-10 scale): 0  Any medication changes, allergies to medications, adverse drug reactions, diagnosis change, or new procedure performed?: [x] No    [] Yes (see summary sheet for update)  Subjective functional status/changes:   [] No changes reported  Patient reports his shoulder is doing great. States he accidentally slept with his L shoulder out stretched over the weekend and felt it the next day but his pain at his worst is a 3/10 \"Just the one day when I slept wrong. Other than that I don't have any shoulder pain. \"     OBJECTIVE    Modality rationale: decrease inflammation and decrease pain to improve the patients ability to reach, lift, perform ADL's   Min Type Additional Details       [] Estim: []Att   []Unatt    []TENS instruct                  []IFC  []Premod   []NMES                     []Other:  []w/US   []w/ice   []w/heat  Position:  Location:       []  Traction: [] Cervical       []Lumbar                       [] Prone          []Supine                       []Intermittent   []Continuous Lbs:  [] before manual  [] after manual  []w/heat    []  Ultrasound: []Continuous   [] Pulsed                       at: []1MHz   []3MHz Location:  W/cm2:    [] Paraffin         Location:   []w/heat   At home [x]  Ice     []  Heat  []  Ice massage Position: sitting  Location: [facility]    []  Laser  []  Other: Position:  Location:      []  Vasopneumatic Device Pressure:       [] lo [] med [] hi   Temperature:      [x] Skin assessment post-treatment:  [x]intact []redness- no adverse reaction []redness  adverse reaction:     40 min Therapeutic Exercise:  [x] See flow sheet : reviewed HEP, added per flow sheet   Rationale: increase ROM, increase strength and improve coordination to improve the patients ability to reach, lift and perform ADL's    15 min Manual Therapy:   Rhythmic stabilization L shoulder at 90 and 120 degrees flexion, IR/ER at 45 degrees scaption, STM L infraspinatus, teres mary lou/min, middle and low trap, s/l scap mobs into posterior tilt/downward rotation     Rationale: decrease pain, increase ROM, increase tissue extensibility and decrease trigger points to improve the patients ability to reach, lift, and perform ADL's            With   [] TE   [] TA   [] Neuro   [] SC   [] other: Patient Education: [x] Review HEP    [] Progressed/Changed HEP based on:   [] positioning   [] body mechanics   [] transfers   [] heat/ice application    [] other:      Other Objective/Functional Measures:     Palpation: Very mild TTP L teres major/min, infraspinatus, middle/lower trap    AROM L shoulder:  Flexion: 170 deg  Abduction:165 deg  Function: IR:T3  Functional ER: T8     Pain Level (0-10 scale) post treatment: 0    ASSESSMENT/Changes in Function:   Tolerated progressed exercises well. Gave updated HEP handout. Significant reduction in TTP along teres mm, infraspinatus and middle/lower trapezius. L shoulder AROM and shoulder function greatly improved since start of PT. Will continue to progress scapular/RTC strength and stability. Patient will continue to benefit from skilled PT services to modify and progress therapeutic interventions, address functional mobility deficits, address ROM deficits, address strength deficits, analyze and address soft tissue restrictions, analyze and cue movement patterns, analyze and modify body mechanics/ergonomics and instruct in home and community integration to attain remaining goals. Progress towards goals / Updated goals:  Short Term Goals:  To be accomplished in 2 weeks:               1. Patient will be I in HEP to promote self management of symptoms. PROGRESSING              2. Patient will report pain level at worst as less than or equal to 5/10 so they can perform ADLs without pain. MET  Long Term Goals: To be accomplished in 4-6 weeks:               1.  Patient will report pain level at worst as less than or equal to 3/10 so they can perform ADLs without pain. MET              2. Patient will have L shoulder AROM = R shoulder AROM so he can reach overhead. 3. Patient will have L shoulder strength > or = R shoulder strength so he can lift without pain.      PLAN  []  Upgrade activities as tolerated     [x]  Continue plan of care  []  Update interventions per flow sheet       []  Discharge due to:_  []  Other:_      Jerry Albert PTA 4/6/2021

## 2021-04-09 ENCOUNTER — HOSPITAL ENCOUNTER (OUTPATIENT)
Dept: PHYSICAL THERAPY | Age: 51
Discharge: HOME | End: 2021-04-09
Payer: COMMERCIAL

## 2021-04-09 PROCEDURE — 97110 THERAPEUTIC EXERCISES: CPT | Performed by: PHYSICAL THERAPY ASSISTANT

## 2021-04-09 NOTE — PROGRESS NOTES
PT DAILY TREATMENT NOTE - [facility] 2-15    Patient Name: Girish Towaoc  Date:2021  : 1970  [x]  Patient  Verified  Payor: BLUE CROSS / Plan: [employer] [id number] PPO / Product Type: PPO /    In time: 7:45 AM  Out time: 8:30 AM  Total Treatment Time (min): 45  Total Timed Codes (min): 45  1:1 Treatment Time ( only): 45   Visit #:  6    Treatment Area: Pain in left shoulder [M25.512]    SUBJECTIVE  Pain Level (0-10 scale): 0  Any medication changes, allergies to medications, adverse drug reactions, diagnosis change, or new procedure performed?: [x] No    [] Yes (see summary sheet for update)  Subjective functional status/changes:   [] No changes reported  Patient reports he is doing very well and would like to get back into the gym at some point. He would like to know what he can do or what to avoid at the gym. He has not had any shoulder pain the past 2 weeks and feels his strength has greatly imprvoed.      OBJECTIVE    Modality rationale: decrease inflammation and decrease pain to improve the patients ability to reach, lift, perform ADL's   Min Type Additional Details       [] Estim: []Att   []Unatt    []TENS instruct                  []IFC  []Premod   []NMES                     []Other:  []w/US   []w/ice   []w/heat  Position:  Location:       []  Traction: [] Cervical       []Lumbar                       [] Prone          []Supine                       []Intermittent   []Continuous Lbs:  [] before manual  [] after manual  []w/heat    []  Ultrasound: []Continuous   [] Pulsed                       at: []1MHz   []3MHz Location:  W/cm2:    [] Paraffin         Location:   []w/heat   At home [x]  Ice     []  Heat  []  Ice massage Position: sitting  Location: [facility]    []  Laser  []  Other: Position:  Location:      []  Vasopneumatic Device Pressure:       [] lo [] med [] hi   Temperature:      [x] Skin assessment post-treatment:  [x]intact []redness- no adverse reaction    []redness  adverse reaction:     45 min Therapeutic Exercise:  [x] See flow sheet : reviewed HEP, added per flow sheet   Rationale: increase ROM, increase strength and improve coordination to improve the patients ability to reach, lift and perform ADL's    - min Manual Therapy:   Rhythmic stabilization L shoulder at 90 and 120 degrees flexion, IR/ER at 45 degrees scaption, STM L infraspinatus, teres mary lou/min, middle and low trap, s/l scap mobs into posterior tilt/downward rotation     Rationale: decrease pain, increase ROM, increase tissue extensibility and decrease trigger points to improve the patients ability to reach, lift, and perform ADL's            With   [] TE   [] TA   [] Neuro   [] SC   [] other: Patient Education: [x] Review HEP    [] Progressed/Changed HEP based on:   [] positioning   [] body mechanics   [] transfers   [] heat/ice application    [] other:      Other Objective/Functional Measures: NT     Pain Level (0-10 scale) post treatment: 0    ASSESSMENT/Changes in Function:   Progressing well with strength training. Very mild TTP along L infraspinatus but overall able to perform functional tasks without aggravation of symptoms. Trending towards d/c after next couple of visits and integrating into independent gym regimen. Patient will continue to benefit from skilled PT services to modify and progress therapeutic interventions, address functional mobility deficits, address ROM deficits, address strength deficits, analyze and address soft tissue restrictions, analyze and cue movement patterns, analyze and modify body mechanics/ergonomics and instruct in home and community integration to attain remaining goals. Progress towards goals / Updated goals:  Short Term Goals: To be accomplished in 2 weeks:               1. Patient will be I in HEP to promote self management of symptoms.  PROGRESSING              2. Patient will report pain level at worst as less than or equal to 5/10 so they can perform ADLs without pain. MET  Long Term Goals: To be accomplished in 4-6 weeks:               1.  Patient will report pain level at worst as less than or equal to 3/10 so they can perform ADLs without pain. MET              2. Patient will have L shoulder AROM = R shoulder AROM so he can reach overhead. 3. Patient will have L shoulder strength > or = R shoulder strength so he can lift without pain.      PLAN  []  Upgrade activities as tolerated     [x]  Continue plan of care  []  Update interventions per flow sheet       []  Discharge due to:_  []  Other:_      Early Skeens, PTA 4/9/2021

## 2021-04-13 ENCOUNTER — HOSPITAL ENCOUNTER (OUTPATIENT)
Dept: PHYSICAL THERAPY | Age: 51
Discharge: HOME | End: 2021-04-13
Payer: COMMERCIAL

## 2021-04-13 PROCEDURE — 97140 MANUAL THERAPY 1/> REGIONS: CPT | Performed by: PHYSICAL THERAPY ASSISTANT

## 2021-04-13 PROCEDURE — 97110 THERAPEUTIC EXERCISES: CPT | Performed by: PHYSICAL THERAPY ASSISTANT

## 2021-04-13 NOTE — PROGRESS NOTES
PT DAILY TREATMENT NOTE - [facility] 2-15    Patient Name: Huyen Guerrero  Date:2021  : 1970  [x]  Patient  Verified  Payor: BLUE CROSS / Plan: Cassius Rankin [id number] PPO / Product Type: PPO /    In time: 7:45 AM  Out time: 8:35 AM  Total Treatment Time (min): 50  Total Timed Codes (min): 50  1:1 Treatment Time ( only): 45  Visit #:  7    Treatment Area: Pain in left shoulder [M25.512]    SUBJECTIVE  Pain Level (0-10 scale): 0  Any medication changes, allergies to medications, adverse drug reactions, diagnosis change, or new procedure performed?: [x] No    [] Yes (see summary sheet for update)  Subjective functional status/changes:   [] No changes reported  Patient reports was sitting on his sofa with his L arm around his dog for an extended period of time the other day. Some discomfort afterwards but went away in 1-2 hours. States that is the only pain he's had in a while. No pain currently L shoulder.     OBJECTIVE    Modality rationale: decrease inflammation and decrease pain to improve the patients ability to reach, lift, perform ADL's   Min Type Additional Details       [] Estim: []Att   []Unatt    []TENS instruct                  []IFC  []Premod   []NMES                     []Other:  []w/US   []w/ice   []w/heat  Position:  Location:       []  Traction: [] Cervical       []Lumbar                       [] Prone          []Supine                       []Intermittent   []Continuous Lbs:  [] before manual  [] after manual  []w/heat    []  Ultrasound: []Continuous   [] Pulsed                       at: []1MHz   []3MHz Location:  W/cm2:    [] Paraffin         Location:   []w/heat   At home [x]  Ice     []  Heat  []  Ice massage Position: sitting  Location: [facility]    []  Laser  []  Other: Position:  Location:      []  Vasopneumatic Device Pressure:       [] lo [] med [] hi   Temperature:      [x] Skin assessment post-treatment:  [x]intact []redness- no adverse reaction    []redness  adverse reaction: 35 min Therapeutic Exercise:  [x] See flow sheet : reviewed HEP, added per flow sheet   Rationale: increase ROM, increase strength and improve coordination to improve the patients ability to reach, lift and perform ADL's    10 min Manual Therapy:   Rhythmic stabilization L shoulder at 90 and 120 degrees flexion, IR/ER at 45 degrees scaption, STM L infraspinatus, teres mary lou/min, middle and low trap, s/l scap mobs into posterior tilt/downward rotation     Rationale: decrease pain, increase ROM, increase tissue extensibility and decrease trigger points to improve the patients ability to reach, lift, and perform ADL's            With   [] TE   [] TA   [] Neuro   [] SC   [] other: Patient Education: [x] Review HEP    [] Progressed/Changed HEP based on:   [] positioning   [] body mechanics   [] transfers   [] heat/ice application    [] other:      Other Objective/Functional Measures: NT     Pain Level (0-10 scale) post treatment: 0    ASSESSMENT/Changes in Function:   Patient tolerated bosu weight shifts and incline push up plus very well today. RTC strength and stability steadily improving. Very compliant with HEP. Patient will continue to benefit from skilled PT services to modify and progress therapeutic interventions, address functional mobility deficits, address ROM deficits, address strength deficits, analyze and address soft tissue restrictions, analyze and cue movement patterns, analyze and modify body mechanics/ergonomics and instruct in home and community integration to attain remaining goals. Progress towards goals / Updated goals:  Short Term Goals: To be accomplished in 2 weeks:               1. Patient will be I in HEP to promote self management of symptoms. PROGRESSING              2. Patient will report pain level at worst as less than or equal to 5/10 so they can perform ADLs without pain. MET  Long Term Goals:  To be accomplished in 4-6 weeks:               1.  Patient will report pain level at worst as less than or equal to 3/10 so they can perform ADLs without pain. MET              2. Patient will have L shoulder AROM = R shoulder AROM so he can reach overhead. 3. Patient will have L shoulder strength > or = R shoulder strength so he can lift without pain.      PLAN  []  Upgrade activities as tolerated     [x]  Continue plan of care  []  Update interventions per flow sheet       []  Discharge due to:_  []  Other:_      Seth Weaver, PTA 4/13/2021

## 2021-04-15 ENCOUNTER — HOSPITAL ENCOUNTER (OUTPATIENT)
Dept: PHYSICAL THERAPY | Age: 51
Discharge: HOME | End: 2021-04-15
Payer: COMMERCIAL

## 2021-04-15 PROCEDURE — 97140 MANUAL THERAPY 1/> REGIONS: CPT | Performed by: PHYSICAL THERAPIST

## 2021-04-15 PROCEDURE — 97110 THERAPEUTIC EXERCISES: CPT | Performed by: PHYSICAL THERAPIST

## 2021-04-15 NOTE — ANCILLARY DISCHARGE INSTRUCTIONS
Physical Therapy at [facility], 
 a part of [facility] [facility] 
[address] [place], [address] Phone: [phone]  Fax: [phone] Discharge Summary  2-15 Patient name: Jessica Saab  : 1970  Provider#:[id number] Referral source: Abner Sanchez MD     
Medical/Treatment Diagnosis: Pain in left shoulder [M25.512] Prior Hospitalization: see medical history Comorbidities: Thyroid disease Prior Level of Function:Able to kayak, paddle board and lift overhead without pain, knew it was there Medications: Verified on Patient Summary List 
 
Start of Care: 4/15      Onset Date:2021 Visits from Start of Care: 8     Missed Visits: 0 Reporting Period : 3/23/21 to 4/15/21 Short Term Goals: To be accomplished in 2 weeks:  
            1. Patient will be I in HEP to promote self management of symptoms. PROGRESSING 
            2. Patient will report pain level at worst as less than or equal to 5/10 so they can perform ADLs without pain. MET Long Term Goals: To be accomplished in 4-6 weeks:  
            1.  Patient will report pain level at worst as less than or equal to 3/10 so they can perform ADLs without pain. MET 
            2. Patient will have L shoulder AROM = R shoulder AROM so he can reach overhead.  MET 
            1. Patient will have L shoulder strength > or = R shoulder strength so he can lift without pain. PROGRESSING 
 
 
 
ASSESSMENT/SUMMARY OF CARE: Patient has been seen x 8 visits. He progressed with decreased pain and increased L shoulder strength. He is I with HEP. RECOMMENDATIONS: 
[x]Discontinue therapy: [x]Patient has reached or is progressing toward set goals []Patient is non-compliant or has abdicated 
    []Due to lack of appreciable progress towards set goals Rola Erwin, PT 4/15/2021

## 2021-04-15 NOTE — PROGRESS NOTES
PT DAILY TREATMENT NOTE - [facility] 2-15    Patient Name: Jf Connolly  Date:4/15/2021  : 1970  [x]  Patient  Verified  Payor: BLUE CROSS / Plan: Cassius Rankin [id number] PPO / Product Type: PPO /    In time: 7:40 AM  Out time: 8:33AM  Total Treatment Time (min): 53  Total Timed Codes (min): 53  1:1 Treatment Time (MC only): 48  Visit #:  8    Treatment Area: Pain in left shoulder [M25.512]    SUBJECTIVE  Pain Level (0-10 scale): 0  Any medication changes, allergies to medications, adverse drug reactions, diagnosis change, or new procedure performed?: [x] No    [] Yes (see summary sheet for update)  Subjective functional status/changes:   [] No changes reported  Patient reports that his shoulder has been doing well.     OBJECTIVE    Modality rationale: decrease inflammation and decrease pain to improve the patients ability to reach, lift, perform ADL's   Min Type Additional Details       [] Estim: []Att   []Unatt    []TENS instruct                  []IFC  []Premod   []NMES                     []Other:  []w/US   []w/ice   []w/heat  Position:  Location:       []  Traction: [] Cervical       []Lumbar                       [] Prone          []Supine                       []Intermittent   []Continuous Lbs:  [] before manual  [] after manual  []w/heat    []  Ultrasound: []Continuous   [] Pulsed                       at: []1MHz   []3MHz Location:  W/cm2:    [] Paraffin         Location:   []w/heat   At home [x]  Ice     []  Heat  []  Ice massage Position: sitting  Location: [facility]    []  Laser  []  Other: Position:  Location:      []  Vasopneumatic Device Pressure:       [] lo [] med [] hi   Temperature:      [x] Skin assessment post-treatment:  [x]intact []redness- no adverse reaction    []redness  adverse reaction:     38 min Therapeutic Exercise:  [x] See flow sheet : reviewed HEP, added per flow sheet   Rationale: increase ROM, increase strength and improve coordination to improve the patients ability to reach, lift and perform ADL's    10 min Manual Therapy:   Rhythmic stabilization L shoulder at 90 and 120 degrees flexion, IR/ER at 45 degrees scaption, STM L infraspinatus, teres mary lou/min, middle and low trap, s/l scap mobs into posterior tilt/downward rotation     Rationale: decrease pain, increase ROM, increase tissue extensibility and decrease trigger points to improve the patients ability to reach, lift, and perform ADL's            With   [x] TE   [] TA   [] Neuro   [] SC   [] other: Patient Education: [x] Review HEP    [] Progressed/Changed HEP based on:   [] positioning   [] body mechanics   [] transfers   [] heat/ice application    [x] other: Updated HEP provided      Other Objective/Functional Measures: NT     Pain Level (0-10 scale) post treatment: 0    ASSESSMENT/Changes in Function:   Patient has been seen x 8 visits. He progressed with decreased pain and increased L shoulder strength. He is I with HEP. Progress towards goals / Updated goals:  Short Term Goals: To be accomplished in 2 weeks:               1. Patient will be I in HEP to promote self management of symptoms. PROGRESSING              2. Patient will report pain level at worst as less than or equal to 5/10 so they can perform ADLs without pain. MET  Long Term Goals: To be accomplished in 4-6 weeks:               1.  Patient will report pain level at worst as less than or equal to 3/10 so they can perform ADLs without pain. MET              2. Patient will have L shoulder AROM = R shoulder AROM so he can reach overhead. MET              3. Patient will have L shoulder strength > or = R shoulder strength so he can lift without pain.  PROGRESSING    PLAN  []  Upgrade activities as tolerated     []  Continue plan of care  []  Update interventions per flow sheet       [x]  Discharge due to:_Progress toward goals   []  Other:_      Gilbert Matute, PT 4/15/2021

## 2021-04-21 ENCOUNTER — VIRTUAL VISIT (OUTPATIENT)
Dept: INTERNAL MEDICINE CLINIC | Age: 51
End: 2021-04-21
Payer: COMMERCIAL

## 2021-04-21 DIAGNOSIS — S43.005A DISLOCATION OF LEFT SHOULDER JOINT, INITIAL ENCOUNTER: Primary | ICD-10-CM

## 2021-04-21 DIAGNOSIS — M75.82 ROTATOR CUFF TENDONITIS, LEFT: ICD-10-CM

## 2021-04-21 PROCEDURE — 99213 OFFICE O/P EST LOW 20 MIN: CPT | Performed by: FAMILY MEDICINE

## 2021-04-21 NOTE — PROGRESS NOTES
Onofre Gregg is a 48 y.o. male who was seen by synchronous (real-time) audio-video technology on 4/21/2021 for Shoulder Pain        Assessment & Plan:   Diagnoses and all orders for this visit:    1. Dislocation of left shoulder joint, initial encounter    2. Rotator cuff tendonitis, left    Patient will continue with home exercise program  Patient will stop taking anti-inflammatories by mouth and will take as needed. Patient will follow up with any reexacerbation of symptoms        Subjective:     Patient is a 51-year-old male who is following up on shoulder pain. Patient states that he is completed physical therapy and was discharged and has no pain currently. Patient states he is amazed at how much relief he got from physical therapy. He continues doing home exercises that were taught to him and is working out in the gym again. Patient denies any radiation or new pain. Prior to Admission medications    Medication Sig Start Date End Date Taking? Authorizing Provider   naproxen (NAPROSYN) 500 mg tablet Take 1 Tab by mouth two (2) times daily (with meals). 3/24/21   Precious Jauregui MD   rosuvastatin (CRESTOR) 5 mg tablet Take 1 Tab by mouth nightly. 3/16/21   Hyder, Gayl Nyhan, MD   lisdexamfetamine (Vyvanse) 30 mg capsule Take 1 Cap by mouth two (2) times a day. Max Daily Amount: 60 mg. 3/16/21   Flash Mar MD   lisdexamfetamine (Vyvanse) 30 mg capsule 1 capsule twice daily 4/16/21   Hyder, Gayl Nyhan, MD   lisdexamfetamine (Vyvanse) 30 mg capsule 1 cap twice daily 5/16/21   Flash Mar MD   multivitamin (ONE A DAY) tablet Take 1 Tab by mouth daily. Provider, Historical   levothyroxine (UNITHROID) 137 mcg tablet Take 137 mcg by mouth Daily (before breakfast). 6/12/19   Hyder, Gayl Nyhan, MD   Cholecalciferol, Vitamin D3, (VITAMIN D3) 2,000 unit cap capsule Take  by mouth daily. Provider, Historical         ROS    Objective:   No flowsheet data found.      [INSTRUCTIONS:  \"[x]\" Indicates a positive item  \"[]\" Indicates a negative item  -- DELETE ALL ITEMS NOT EXAMINED]    Constitutional: [x] Appears well-developed and well-nourished [x] No apparent distress      [] Abnormal -     Mental status: [x] Alert and awake  [x] Oriented to person/place/time [x] Able to follow commands    [] Abnormal -     Eyes:   EOM    [x]  Normal    [] Abnormal -   Sclera  [x]  Normal    [] Abnormal -          Discharge [x]  None visible   [] Abnormal -     HENT: [x] Normocephalic, atraumatic  [] Abnormal -   [x] Mouth/Throat: Mucous membranes are moist    External Ears [x] Normal  [] Abnormal -    Neck: [x] No visualized mass [] Abnormal -     Pulmonary/Chest: [x] Respiratory effort normal   [x] No visualized signs of difficulty breathing or respiratory distress        [] Abnormal -      Musculoskeletal:   [x] Normal gait with no signs of ataxia         [x] Normal range of motion of neck        [] Abnormal -     Neurological:        [x] No Facial Asymmetry (Cranial nerve 7 motor function) (limited exam due to video visit)          [x] No gaze palsy        [] Abnormal -          Skin:        [x] No significant exanthematous lesions or discoloration noted on facial skin         [] Abnormal -            Psychiatric:       [x] Normal Affect [] Abnormal -        [x] No Hallucinations    Other pertinent observable physical exam findings:-        We discussed the expected course, resolution and complications of the diagnosis(es) in detail. Medication risks, benefits, costs, interactions, and alternatives were discussed as indicated. I advised him to contact the office if his condition worsens, changes or fails to improve as anticipated. He expressed understanding with the diagnosis(es) and plan. Racheal Mireles was evaluated through a synchronous (real-time) audio-video encounter. The patient (or guardian if applicable) is aware that this is a billable service. Verbal consent to proceed has been obtained within the past 12 months. The visit was conducted pursuant to the emergency declaration under the [facility] [address] and the [employer] and [employer] General Act. Patient identification was verified, and a caregiver was present when appropriate. The patient was located in a state where the provider was credentialed to provide care.       Thi Tariq MD

## 2021-06-08 ENCOUNTER — OFFICE VISIT (OUTPATIENT)
Dept: FAMILY MEDICINE CLINIC | Age: 51
End: 2021-06-08
Payer: COMMERCIAL

## 2021-06-08 VITALS
SYSTOLIC BLOOD PRESSURE: 105 MMHG | RESPIRATION RATE: 16 BRPM | BODY MASS INDEX: 25.8 KG/M2 | HEART RATE: 72 BPM | TEMPERATURE: 98.7 F | OXYGEN SATURATION: 99 % | HEIGHT: 74 IN | WEIGHT: 201 LBS | DIASTOLIC BLOOD PRESSURE: 64 MMHG

## 2021-06-08 DIAGNOSIS — F98.8 ATTENTION DEFICIT DISORDER (ADD) WITHOUT HYPERACTIVITY: Primary | ICD-10-CM

## 2021-06-08 PROCEDURE — 99213 OFFICE O/P EST LOW 20 MIN: CPT | Performed by: FAMILY MEDICINE

## 2021-06-08 NOTE — PROGRESS NOTES
Chief Complaint   Patient presents with    Attention Deficit Disorder    Thyroid Problem    Cholesterol Problem     1. Have you been to the ER, urgent care clinic since your last visit? Hospitalized since your last visit?no    2. Have you seen or consulted any other health care providers outside of the [address] [place] [address] since your last visit? Include any pap smears or colon screening.  No  Physical Therapy - Shoulder now complete

## 2021-06-08 NOTE — PROGRESS NOTES
HPI  Justine Hebert 48 y.o. male  presents to the office today for ADD. Blood pressure 105/64, pulse 72, temperature 98.7 °F (37.1 °C), temperature source Temporal, resp. rate 16, height 6' 2\" (1.88 m), weight 201 lb (91.2 kg), SpO2 99 %. Body mass index is 25.81 kg/m². Chief Complaint   Patient presents with    Attention Deficit Disorder    Thyroid Problem    Cholesterol Problem        Hyperlipidemia: Lipid panel on 12/14/2020 notable for total cholesterol 263, HDL 66, , and triglycerides 91. Pt continues with Crestor 5 mg/day. He notes that he has excess Lipitor at home and asks if he can take this medication instead of his Crestor, as the Crestor causes pt \"brain fog\". I advised pt that he can take Lipitor 10 mg/day instead. ADD: Pt continues with Vyvanse 30 mg/BID. Hypothyroidism: Pt's TSH level was 0.88 on 12/14/2020. Pt continues with Unithroid 137 mcg/day. Vitamin D deficiency: Pt's vitamin D levels were 40.4 on 12/14/2020. Pt continues with cholecalciferol. Current Outpatient Medications   Medication Sig Dispense Refill    lisdexamfetamine (Vyvanse) 30 mg capsule Take 1 Capsule by mouth two (2) times a day. Max Daily Amount: 60 mg. 60 Capsule 0    lisdexamfetamine (Vyvanse) 30 mg capsule Take 1 Capsule by mouth two (2) times a day. Max Daily Amount: 60 mg. 60 Capsule 0    lisdexamfetamine (Vyvanse) 30 mg capsule Take 1 Capsule by mouth two (2) times a day. Max Daily Amount: 60 mg. 60 Capsule 0    rosuvastatin (CRESTOR) 5 mg tablet Take 1 Tab by mouth nightly. 30 Tab 5    multivitamin (ONE A DAY) tablet Take 1 Tab by mouth daily.  levothyroxine (UNITHROID) 137 mcg tablet Take 137 mcg by mouth Daily (before breakfast). 90 Tab 1    Cholecalciferol, Vitamin D3, (VITAMIN D3) 2,000 unit cap capsule Take  by mouth daily. Allergies   Allergen Reactions    Soy Anaphylaxis     History reviewed. No pertinent past medical history.   Past Surgical History: Procedure Laterality Date    HX VASECTOMY       Family History   Problem Relation Age of Onset    High Cholesterol Father     Migraines Sister     Thyroid Disease Sister      Social History     Tobacco Use    Smoking status: Never Smoker    Smokeless tobacco: Never Used   Substance Use Topics    Alcohol use: Yes        Review of Systems   Constitutional: Negative for chills and fever. HENT: Negative for hearing loss and tinnitus. Eyes: Negative for blurred vision and double vision. Respiratory: Negative for cough and shortness of breath. Cardiovascular: Negative for chest pain and palpitations. Gastrointestinal: Negative for nausea and vomiting. Genitourinary: Negative for dysuria and frequency. Musculoskeletal: Negative for back pain and falls. Skin: Negative for itching and rash. Neurological: Negative for dizziness, loss of consciousness and headaches. Endo/Heme/Allergies: Negative. Psychiatric/Behavioral: Negative for depression. The patient is not nervous/anxious. Physical Exam  Vitals reviewed. Constitutional:       Appearance: Normal appearance. HENT:      Head: Normocephalic and atraumatic. Right Ear: Tympanic membrane, ear canal and external ear normal.      Left Ear: Tympanic membrane, ear canal and external ear normal.      Nose: Nose normal.      Mouth/Throat:      Mouth: Mucous membranes are moist.      Pharynx: Oropharynx is clear. Eyes:      Extraocular Movements: Extraocular movements intact. Conjunctiva/sclera: Conjunctivae normal.      Pupils: Pupils are equal, round, and reactive to light. Cardiovascular:      Rate and Rhythm: Normal rate and regular rhythm. Pulses: Normal pulses. Heart sounds: Normal heart sounds. Pulmonary:      Effort: Pulmonary effort is normal.      Breath sounds: Normal breath sounds. Abdominal:      General: Abdomen is flat. Bowel sounds are normal.      Palpations: Abdomen is soft.    Musculoskeletal: General: Normal range of motion. Cervical back: Normal range of motion and neck supple. Skin:     General: Skin is warm and dry. Neurological:      General: No focal deficit present. Mental Status: He is alert and oriented to person, place, and time. Psychiatric:         Mood and Affect: Mood normal.         Behavior: Behavior normal.         Judgment: Judgment normal.           ASSESSMENT and PLAN  Diagnoses and all orders for this visit:    1. Attention deficit disorder (ADD) without hyperactivity  Pt requests a refill of their medication, which I have granted. The Prescription Monitoring Program has been reviewed for recent activity regarding controlled substances for this patient. -     lisdexamfetamine (Vyvanse) 30 mg capsule; Take 1 Capsule by mouth two (2) times a day. Max Daily Amount: 60 mg.  -     lisdexamfetamine (Vyvanse) 30 mg capsule; Take 1 Capsule by mouth two (2) times a day. Max Daily Amount: 60 mg.  -     lisdexamfetamine (Vyvanse) 30 mg capsule; Take 1 Capsule by mouth two (2) times a day. Max Daily Amount: 60 mg. Follow-up and Dispositions    · Return in about 3 months (around 9/8/2021) for ADD. Medication risks/benefits/costs/interactions/alternatives discussed with patient. Advised patient to call back or return to office if symptoms worsen/change/persist.  If patient cannot reach us or should anything more severe/urgent arise he/she should proceed directly to the nearest emergency department. Discussed expected course/resolution/complications of diagnosis in detail with patient. Patient given a written after visit summary which includes diagnoses, current medications and vitals. Patient expressed understanding with the diagnosis and plan. Written by meek Daniels, as dictated by Kenneth Cox M.D.    10:35 AM - 10:46 AM    Total time spent with the patient 11 minutes, greater than 50% of time spent counseling patient.

## 2021-08-12 DIAGNOSIS — E78.2 MIXED HYPERLIPIDEMIA: ICD-10-CM

## 2021-08-15 RX ORDER — ROSUVASTATIN CALCIUM 5 MG/1
TABLET, COATED ORAL
Qty: 90 TABLET | Refills: 1 | Status: SHIPPED | OUTPATIENT
Start: 2021-08-15 | End: 2021-09-13 | Stop reason: ALTCHOICE

## 2021-09-03 RX ORDER — ATORVASTATIN CALCIUM 10 MG/1
10 TABLET, FILM COATED ORAL
Qty: 90 TABLET | Refills: 1 | OUTPATIENT
Start: 2021-09-03

## 2021-09-13 ENCOUNTER — OFFICE VISIT (OUTPATIENT)
Dept: FAMILY MEDICINE CLINIC | Age: 51
End: 2021-09-13
Payer: COMMERCIAL

## 2021-09-13 VITALS
RESPIRATION RATE: 16 BRPM | DIASTOLIC BLOOD PRESSURE: 65 MMHG | SYSTOLIC BLOOD PRESSURE: 127 MMHG | BODY MASS INDEX: 25.15 KG/M2 | WEIGHT: 196 LBS | HEIGHT: 74 IN | TEMPERATURE: 98.4 F | HEART RATE: 84 BPM | OXYGEN SATURATION: 97 %

## 2021-09-13 DIAGNOSIS — Z23 ENCOUNTER FOR IMMUNIZATION: ICD-10-CM

## 2021-09-13 DIAGNOSIS — E78.2 MIXED HYPERLIPIDEMIA: Primary | ICD-10-CM

## 2021-09-13 DIAGNOSIS — F98.8 ATTENTION DEFICIT DISORDER (ADD) WITHOUT HYPERACTIVITY: ICD-10-CM

## 2021-09-13 DIAGNOSIS — Z11.59 SCREENING FOR VIRAL DISEASE: ICD-10-CM

## 2021-09-13 PROCEDURE — 99214 OFFICE O/P EST MOD 30 MIN: CPT | Performed by: FAMILY MEDICINE

## 2021-09-13 PROCEDURE — 90686 IIV4 VACC NO PRSV 0.5 ML IM: CPT | Performed by: FAMILY MEDICINE

## 2021-09-13 PROCEDURE — 90471 IMMUNIZATION ADMIN: CPT | Performed by: FAMILY MEDICINE

## 2021-09-13 RX ORDER — ATORVASTATIN CALCIUM 10 MG/1
TABLET, FILM COATED ORAL
Qty: 90 TABLET | Refills: 1 | Status: SHIPPED | OUTPATIENT
Start: 2021-09-13 | End: 2022-03-13

## 2021-09-13 RX ORDER — ZOSTER VACCINE RECOMBINANT, ADJUVANTED 50 MCG/0.5
0.5 KIT INTRAMUSCULAR ONCE
Qty: 0.5 ML | Refills: 1 | Status: SHIPPED | OUTPATIENT
Start: 2021-09-13 | End: 2021-09-13

## 2021-09-13 NOTE — PROGRESS NOTES
HPI  Russell Anthony 46 y.o. male  presents to the office today for follow-up. Blood pressure 127/65, pulse 84, temperature 98.4 °F (36.9 °C), resp. rate 16, height 6' 2\" (1.88 m), weight 196 lb (88.9 kg), SpO2 97 %. Body mass index is 25.16 kg/m². Chief Complaint   Patient presents with    Follow-up      Hyperlipidemia: Lipid panel on 12/14/20 notable for total cholesterol 263, HDL 66, , and triglycerides 91. Pt continues with Rosuvastatin 5mg daily. Pt will have updated lab work performed during today's OV. ADD: Pt continues on Vyvanse 30mg BID with improved concentration and focus in his daily activities. Pt requests a refill of their medication, which I have granted. The Prescription Monitoring Program has been reviewed for recent activity regarding controlled substances for this patient. Hypothyroidism: Pt's TSH level was 0.88 on 12/14/20. Pt continues with Levothyroxine 137mcg daily. Health Maintenance: Pt will undergo a one time hepatitis C screening during today's appointment. Provided pt with prescription and will follow up with local pharmacy for Shingrix vaccine. He believes that he may have had a previous episode of Shingles. Pt requests to receive their flu shot during today's OV;  I will have one of my nurses administer it. Current Outpatient Medications   Medication Sig Dispense Refill    atorvastatin (LIPITOR) 10 mg tablet TAKE 1 TABLET BY MOUTH EVERY DAY 90 Tablet 1    varicella-zoster recombinant, PF, (Shingrix, PF,) 50 mcg/0.5 mL susr injection 0.5 mL by IntraMUSCular route once for 1 dose. 0.5 mL 1    lisdexamfetamine (VYVANSE) 30 mg capsule Take 1 Capsule by mouth every morning for 29 days. Max Daily Amount: 30 mg. 30 Capsule 0    [START ON 10/13/2021] lisdexamfetamine (VYVANSE) 30 mg capsule Take 1 Capsule by mouth every morning for 29 days.  Max Daily Amount: 30 mg. 30 Capsule 0    [START ON 11/12/2021] lisdexamfetamine (VYVANSE) 30 mg capsule Take 1 Capsule by mouth every morning for 29 days. Max Daily Amount: 30 mg. 30 Capsule 0    lisdexamfetamine (Vyvanse) 30 mg capsule Take 1 Capsule by mouth two (2) times a day. Max Daily Amount: 60 mg. 60 Capsule 0    multivitamin (ONE A DAY) tablet Take 1 Tab by mouth daily.  levothyroxine (UNITHROID) 137 mcg tablet Take 137 mcg by mouth Daily (before breakfast). 90 Tab 1    Cholecalciferol, Vitamin D3, (VITAMIN D3) 2,000 unit cap capsule Take  by mouth daily.  lisdexamfetamine (Vyvanse) 30 mg capsule Take 1 Capsule by mouth two (2) times a day. Max Daily Amount: 60 mg. 60 Capsule 0    lisdexamfetamine (Vyvanse) 30 mg capsule Take 1 Capsule by mouth two (2) times a day. Max Daily Amount: 60 mg. 60 Capsule 0     Allergies   Allergen Reactions    Soy Anaphylaxis     No past medical history on file. Past Surgical History:   Procedure Laterality Date    HX VASECTOMY       Family History   Problem Relation Age of Onset    High Cholesterol Father     Migraines Sister     Thyroid Disease Sister      Social History     Tobacco Use    Smoking status: Never Smoker    Smokeless tobacco: Never Used   Substance Use Topics    Alcohol use: Yes        Review of Systems   Constitutional: Negative for chills and fever. HENT: Negative for hearing loss and tinnitus. Eyes: Negative for blurred vision and double vision. Respiratory: Negative for cough and shortness of breath. Cardiovascular: Negative for chest pain and palpitations. Gastrointestinal: Negative for nausea and vomiting. Genitourinary: Negative for dysuria and frequency. Musculoskeletal: Negative for back pain and falls. Skin: Negative for itching and rash. Neurological: Negative for dizziness, loss of consciousness and headaches. Endo/Heme/Allergies: Negative. Psychiatric/Behavioral: Negative for depression. The patient is not nervous/anxious. Physical Exam  Vitals reviewed.    Constitutional:       Appearance: Normal appearance. HENT:      Head: Normocephalic and atraumatic. Right Ear: Tympanic membrane, ear canal and external ear normal.      Left Ear: Tympanic membrane, ear canal and external ear normal.      Nose: Nose normal.      Mouth/Throat:      Mouth: Mucous membranes are moist.      Pharynx: Oropharynx is clear. Eyes:      Extraocular Movements: Extraocular movements intact. Conjunctiva/sclera: Conjunctivae normal.      Pupils: Pupils are equal, round, and reactive to light. Cardiovascular:      Rate and Rhythm: Normal rate and regular rhythm. Pulses: Normal pulses. Heart sounds: Normal heart sounds. Pulmonary:      Effort: Pulmonary effort is normal.      Breath sounds: Normal breath sounds. Abdominal:      General: Abdomen is flat. Bowel sounds are normal.      Palpations: Abdomen is soft. Musculoskeletal:         General: Normal range of motion. Cervical back: Normal range of motion and neck supple. Skin:     General: Skin is warm and dry. Neurological:      General: No focal deficit present. Mental Status: He is alert and oriented to person, place, and time. Psychiatric:         Mood and Affect: Mood normal.         Behavior: Behavior normal.         Judgment: Judgment normal.           ASSESSMENT and PLAN  Diagnoses and all orders for this visit:    1. Mixed hyperlipidemia  Lipid panel on 12/14/20 notable for total cholesterol 263, HDL 66, , and triglycerides 91. Pt continues with Rosuvastatin 5mg daily. Pt will have updated lab work performed during today's OV.   -     atorvastatin (LIPITOR) 10 mg tablet; TAKE 1 TABLET BY MOUTH EVERY DAY  -     METABOLIC PANEL, COMPREHENSIVE; Future  -     LIPID PANEL; Future    2. Encounter for immunization  Pt requests to receive their flu shot during today's OV;  I will have one of my nurses administer it. Provided pt with prescription and will follow up with local pharmacy for Shingrix vaccine.  He believes that he may have had a previous episode of Shingles. -     varicella-zoster recombinant, PF, (Shingrix, PF,) 50 mcg/0.5 mL susr injection; 0.5 mL by IntraMUSCular route once for 1 dose. 3. Screening for viral disease  Pt will undergo a one time hepatitis C screening during today's appointment. -     CBC WITH AUTOMATED DIFF; Future  -     HCV AB W/RFLX TO JESUS; Future    4. Attention deficit disorder (ADD) without hyperactivity  Pt continues on Vyvanse 30mg BID with improved concentration and focus in his daily activities. Pt requests a refill of their medication, which I have granted. The Prescription Monitoring Program has been reviewed for recent activity regarding controlled substances for this patient. -     lisdexamfetamine (VYVANSE) 30 mg capsule; Take 1 Capsule by mouth every morning for 29 days. Max Daily Amount: 30 mg.  -     lisdexamfetamine (VYVANSE) 30 mg capsule; Take 1 Capsule by mouth every morning for 29 days. Max Daily Amount: 30 mg.  -     lisdexamfetamine (VYVANSE) 30 mg capsule; Take 1 Capsule by mouth every morning for 29 days. Max Daily Amount: 30 mg. Follow-up and Dispositions    · Return in about 3 months (around 12/13/2021) for follow up. Medication risks/benefits/costs/interactions/alternatives discussed with patient. Advised patient to call back or return to office if symptoms worsen/change/persist.  If patient cannot reach us or should anything more severe/urgent arise he/she should proceed directly to the nearest emergency department. Discussed expected course/resolution/complications of diagnosis in detail with patient. Patient given a written after visit summary which includes diagnoses, current medications and vitals. Patient expressed understanding with the diagnosis and plan. Written by meek Reed, as dictated by Alix Jacobsen M.D.    2:03 PM - 2:15 PM    Total time spent with the patient 12 minutes, greater than 50% of time spent counseling patient.

## 2021-09-13 NOTE — PROGRESS NOTES
Chief Complaint   Patient presents with    Follow-up     1. Have you been to the ER, urgent care clinic since your last visit? Hospitalized since your last visit? No    2. Have you seen or consulted any other health care providers outside of the [address] [place] [address] since your last visit? Include any pap smears or colon screening.  No

## 2021-09-16 ENCOUNTER — PATIENT MESSAGE (OUTPATIENT)
Dept: FAMILY MEDICINE CLINIC | Age: 51
End: 2021-09-16

## 2021-09-16 DIAGNOSIS — F98.8 ATTENTION DEFICIT DISORDER (ADD) WITHOUT HYPERACTIVITY: ICD-10-CM

## 2021-09-16 NOTE — TELEPHONE ENCOUNTER
From: Avery Hdz  To: Caitlin Mortensen MD  Sent: 9/16/2021 2:40 PM EDT  Subject: Prescription Question    I just saw Dr Bradley Flores for a med check-up on Monday, 9/13, for my Vyvanse prescription. The prescription that I had previously been taking, and what we were renewing, was 1 tablet, 30 mg, twice a day (max 60mg / day). However, what was sent to [facility] was 1 tablet, 30 mg, once a day (max 30mg / day)    Can a corrected Vyvanse prescription for 30mg twice a day be sent to [facility]?    Thank you.

## 2021-12-13 ENCOUNTER — OFFICE VISIT (OUTPATIENT)
Dept: FAMILY MEDICINE CLINIC | Age: 51
End: 2021-12-13
Payer: COMMERCIAL

## 2021-12-13 VITALS
RESPIRATION RATE: 16 BRPM | TEMPERATURE: 98.2 F | BODY MASS INDEX: 26.05 KG/M2 | WEIGHT: 203 LBS | DIASTOLIC BLOOD PRESSURE: 68 MMHG | HEIGHT: 74 IN | OXYGEN SATURATION: 100 % | SYSTOLIC BLOOD PRESSURE: 100 MMHG | HEART RATE: 76 BPM

## 2021-12-13 DIAGNOSIS — F98.8 ATTENTION DEFICIT DISORDER (ADD) WITHOUT HYPERACTIVITY: Primary | ICD-10-CM

## 2021-12-13 PROCEDURE — 99213 OFFICE O/P EST LOW 20 MIN: CPT | Performed by: FAMILY MEDICINE

## 2021-12-13 NOTE — PROGRESS NOTES
Chief Complaint   Patient presents with    Attention Deficit Disorder    Thyroid Problem    Cholesterol Problem     1. Have you been to the ER, urgent care clinic since your last visit? Hospitalized since your last visit?no    2. Have you seen or consulted any other health care providers outside of the [address] [place] [address] since your last visit? Include any pap smears or colon screening.  No

## 2021-12-13 NOTE — PROGRESS NOTES
KIKE Pittman 46 y.o. male  presents to the office today for ADD, Thyroid Problem, and Cholesterol Problem. Blood pressure 100/68, pulse 76, temperature 98.2 °F (36.8 °C), temperature source Temporal, resp. rate 16, height 6' 2\" (1.88 m), weight 203 lb (92.1 kg), SpO2 100 %. Body mass index is 26.06 kg/m². Chief Complaint   Patient presents with    Attention Deficit Disorder    Thyroid Problem    Cholesterol Problem      Attention Deficit Disorder (ADD): Pt has a MHx of ADD and is currently on Vyvanse 30mg take 1 capsule orally BID. Max Daily Amount: 60mg. Pt requests a refill of their medication, which I have granted for the next three months. The Prescription Monitoring Program has been reviewed for recent activity regarding controlled substances for this patient. Hypothyroidism: Pt's TSH level was 0.88 on 12/14/20. Pt continues with Unithroid 137mcg take 137 mcg orally daily. Hyperlipidemia: Lipid panel on 12/14/20 notable for total cholesterol 263, HDL 66, .8, and triglycerides 91. Pt continues with Lipitor 10mg take 1 tablet orally daily. Pt will have updated lab work performed in the future. Health Maintenance: Pt reports that he plans on receiving his COVID-19 booster vaccination. Current Outpatient Medications   Medication Sig Dispense Refill    lisdexamfetamine (Vyvanse) 30 mg capsule Take 1 Capsule by mouth two (2) times a day. Max Daily Amount: 60 mg. 60 Capsule 0    lisdexamfetamine (VYVANSE) 30 mg capsule Take 1 Capsule by mouth two (2) times a day. Max Daily Amount: 60 mg. 60 Capsule 0    [START ON 12/18/2021] lisdexamfetamine (VYVANSE) 30 mg capsule Take 1 Capsule by mouth two (2) times a day. Max Daily Amount: 60 mg. 60 Capsule 0    atorvastatin (LIPITOR) 10 mg tablet TAKE 1 TABLET BY MOUTH EVERY DAY 90 Tablet 1    levothyroxine (UNITHROID) 137 mcg tablet Take 137 mcg by mouth Daily (before breakfast).  90 Tab 1    Cholecalciferol, Vitamin D3, (VITAMIN D3) 2,000 unit cap capsule Take  by mouth daily. Allergies   Allergen Reactions    Soy Anaphylaxis     History reviewed. No pertinent past medical history. Past Surgical History:   Procedure Laterality Date    HX VASECTOMY       Family History   Problem Relation Age of Onset    High Cholesterol Father     Migraines Sister     Thyroid Disease Sister      Social History     Tobacco Use    Smoking status: Never Smoker    Smokeless tobacco: Never Used   Substance Use Topics    Alcohol use: Yes        Review of Systems   Constitutional: Negative for chills and fever. HENT: Negative for hearing loss and tinnitus. Eyes: Negative for blurred vision and double vision. Respiratory: Negative for cough and shortness of breath. Cardiovascular: Negative for chest pain and palpitations. Gastrointestinal: Negative for nausea and vomiting. Genitourinary: Negative for dysuria and frequency. Musculoskeletal: Negative for back pain and falls. Skin: Negative for itching and rash. Neurological: Negative for dizziness, loss of consciousness and headaches. Endo/Heme/Allergies: Negative. Psychiatric/Behavioral: Negative for depression. The patient is not nervous/anxious. Physical Exam  Vitals reviewed. Constitutional:       Appearance: Normal appearance. HENT:      Head: Normocephalic and atraumatic. Right Ear: Tympanic membrane, ear canal and external ear normal.      Left Ear: Tympanic membrane, ear canal and external ear normal.      Nose: Nose normal.      Mouth/Throat:      Mouth: Mucous membranes are moist.      Pharynx: Oropharynx is clear. Eyes:      Extraocular Movements: Extraocular movements intact. Conjunctiva/sclera: Conjunctivae normal.      Pupils: Pupils are equal, round, and reactive to light. Cardiovascular:      Rate and Rhythm: Normal rate and regular rhythm. Pulses: Normal pulses. Heart sounds: Normal heart sounds.    Pulmonary:      Effort: Pulmonary effort is normal.      Breath sounds: Normal breath sounds. Abdominal:      General: Abdomen is flat. Bowel sounds are normal.      Palpations: Abdomen is soft. Musculoskeletal:         General: Normal range of motion. Cervical back: Normal range of motion and neck supple. Skin:     General: Skin is warm and dry. Neurological:      General: No focal deficit present. Mental Status: He is alert and oriented to person, place, and time. Psychiatric:         Mood and Affect: Mood normal.         Behavior: Behavior normal.         Judgment: Judgment normal.           ASSESSMENT and PLAN  Diagnoses and all orders for this visit:    1. Attention deficit disorder (ADD) without hyperactivity  Pt has a MHx of ADD and is currently on Vyvanse 30mg take 1 capsule orally BID. Max Daily Amount: 60mg. Pt requests a refill of their medication, which I have granted for the next three months. The Prescription Monitoring Program has been reviewed for recent activity regarding controlled substances for this patient. -     lisdexamfetamine (Vyvanse) 30 mg capsule; Take 1 Capsule by mouth two (2) times a day. Max Daily Amount: 60 mg.  -     lisdexamfetamine (VYVANSE) 30 mg capsule; Take 1 Capsule by mouth two (2) times a day. Max Daily Amount: 60 mg.  -     lisdexamfetamine (VYVANSE) 30 mg capsule; Take 1 Capsule by mouth two (2) times a day. Max Daily Amount: 60 mg. Follow-up and Dispositions    · Return in about 3 months (around 3/13/2022) for ADD. Medication risks/benefits/costs/interactions/alternatives discussed with patient. Advised patient to call back or return to office if symptoms worsen/change/persist.  If patient cannot reach us or should anything more severe/urgent arise he/she should proceed directly to the nearest emergency department. Discussed expected course/resolution/complications of diagnosis in detail with patient.     Patient given a written after visit summary which includes diagnoses, current medications and vitals. Patient expressed understanding with the diagnosis and plan. Written by meek Mary, as dictated by Justin Amado M.D.    8:32 AM - 8:42 AM    Total time spent with the patient 10 minutes, greater than 50% of time spent counseling patient.

## 2022-01-10 ENCOUNTER — TELEPHONE (OUTPATIENT)
Dept: FAMILY MEDICINE CLINIC | Age: 52
End: 2022-01-10

## 2022-01-10 NOTE — TELEPHONE ENCOUNTER
Called Pt and LVM stating that I was calling to confirm the appointment and to please call the office back if there are any questions.

## 2022-01-11 ENCOUNTER — OFFICE VISIT (OUTPATIENT)
Dept: FAMILY MEDICINE CLINIC | Age: 52
End: 2022-01-11
Payer: COMMERCIAL

## 2022-01-11 VITALS
DIASTOLIC BLOOD PRESSURE: 86 MMHG | RESPIRATION RATE: 16 BRPM | TEMPERATURE: 98.4 F | SYSTOLIC BLOOD PRESSURE: 110 MMHG | HEIGHT: 74 IN | WEIGHT: 204 LBS | HEART RATE: 72 BPM | BODY MASS INDEX: 26.18 KG/M2 | OXYGEN SATURATION: 100 %

## 2022-01-11 DIAGNOSIS — Z00.00 PHYSICAL EXAM: Primary | ICD-10-CM

## 2022-01-11 DIAGNOSIS — Z23 ENCOUNTER FOR IMMUNIZATION: ICD-10-CM

## 2022-01-11 PROCEDURE — 99396 PREV VISIT EST AGE 40-64: CPT | Performed by: FAMILY MEDICINE

## 2022-01-11 RX ORDER — ZOSTER VACCINE RECOMBINANT, ADJUVANTED 50 MCG/0.5
0.5 KIT INTRAMUSCULAR ONCE
Qty: 0.5 ML | Refills: 1 | Status: SHIPPED | OUTPATIENT
Start: 2022-01-11 | End: 2022-01-11

## 2022-01-11 NOTE — PROGRESS NOTES
KIKE  Annemarie Salas 46 y.o. male  presents to the office today for Attention Deficit Disorder. Blood pressure 110/86, pulse 72, temperature 98.4 °F (36.9 °C), temperature source Temporal, resp. rate 16, height 6' 2\" (1.88 m), weight 204 lb (92.5 kg), SpO2 100 %. Body mass index is 26.19 kg/m². Chief Complaint   Patient presents with    Attention Deficit Disorder     CPE: Pt presents today for a CPE, which I performed. All findings were normal. Pt will have ANA performed today. Pt will have updated lab work performed during today's OV. Pt reports that he has been walking a lot more. Health Maintenance: Pt reports that he has received his COVID-19 Booster vaccination [employer]) on 12/21/21. Provided pt with prescription and will follow up with local pharmacy for Shingrix vaccine. Current Outpatient Medications   Medication Sig Dispense Refill    multivit-mins no.63/iron/folic (M-VIT PO) Take  by mouth.  varicella-zoster recombinant, PF, (Shingrix, PF,) 50 mcg/0.5 mL susr injection 0.5 mL by IntraMUSCular route once for 1 dose. 0.5 mL 1    [START ON 2/17/2022] lisdexamfetamine (Vyvanse) 30 mg capsule Take 1 Capsule by mouth two (2) times a day. Max Daily Amount: 60 mg. 60 Capsule 0    atorvastatin (LIPITOR) 10 mg tablet TAKE 1 TABLET BY MOUTH EVERY DAY 90 Tablet 1    levothyroxine (UNITHROID) 137 mcg tablet Take 137 mcg by mouth Daily (before breakfast). 90 Tab 1    Cholecalciferol, Vitamin D3, (VITAMIN D3) 2,000 unit cap capsule Take  by mouth daily. Allergies   Allergen Reactions    Soy Anaphylaxis     History reviewed. No pertinent past medical history.   Past Surgical History:   Procedure Laterality Date    HX VASECTOMY       Family History   Problem Relation Age of Onset    High Cholesterol Father     Migraines Sister     Thyroid Disease Sister      Social History     Tobacco Use    Smoking status: Never Smoker    Smokeless tobacco: Never Used   Substance Use Topics    Alcohol use: Yes        Review of Systems   Constitutional: Negative for chills and fever. HENT: Negative for hearing loss and tinnitus. Eyes: Negative for blurred vision and double vision. Respiratory: Negative for cough and shortness of breath. Cardiovascular: Negative for chest pain and palpitations. Gastrointestinal: Negative for nausea and vomiting. Genitourinary: Negative for dysuria and frequency. Musculoskeletal: Negative for back pain and falls. Skin: Negative for itching and rash. Neurological: Negative for dizziness, loss of consciousness and headaches. Endo/Heme/Allergies: Negative. Psychiatric/Behavioral: Negative for depression. The patient is not nervous/anxious. Physical Exam  Vitals reviewed. Constitutional:       Appearance: Normal appearance. HENT:      Head: Normocephalic and atraumatic. Right Ear: Tympanic membrane, ear canal and external ear normal.      Left Ear: Tympanic membrane, ear canal and external ear normal.      Nose: Nose normal.      Mouth/Throat:      Mouth: Mucous membranes are moist.      Pharynx: Oropharynx is clear. Eyes:      Extraocular Movements: Extraocular movements intact. Conjunctiva/sclera: Conjunctivae normal.      Pupils: Pupils are equal, round, and reactive to light. Cardiovascular:      Rate and Rhythm: Normal rate and regular rhythm. Pulses: Normal pulses. Heart sounds: Normal heart sounds. Pulmonary:      Effort: Pulmonary effort is normal.      Breath sounds: Normal breath sounds. Abdominal:      General: Abdomen is flat. Bowel sounds are normal.      Palpations: Abdomen is soft. Genitourinary:     Prostate: Enlarged (Smooth, Symmetrical). No nodules present. Rectum: Guaiac result negative. Musculoskeletal:         General: Normal range of motion. Cervical back: Normal range of motion and neck supple. Skin:     General: Skin is warm and dry.    Neurological:      General: No focal deficit present. Mental Status: He is alert and oriented to person, place, and time. Psychiatric:         Mood and Affect: Mood normal.         Behavior: Behavior normal.         Judgment: Judgment normal.           ASSESSMENT and PLAN  Diagnoses and all orders for this visit:    1. Physical exam  Pt presents today for a CPE, which I performed. All findings were normal. Pt will have ANA performed today. Pt will have updated lab work performed during today's [address]; Future  -     CBC WITH AUTOMATED DIFF; Future  -     LIPID PANEL; Future  -     TSH 3RD GENERATION; Future  -     T4, FREE; Future  -     URINALYSIS W/MICROSCOPIC; Future  -     PSA W/ REFLX FREE PSA; Future  -     VITAMIN D, 25 HYDROXY; Future  -     HCV AB W/RFLX TO JESUS; Future    2. Encounter for immunization  Provided pt with prescription and will follow up with local pharmacy for Shingrix vaccine. -     varicella-zoster recombinant, PF, (Shingrix, PF,) 50 mcg/0.5 mL susr injection; 0.5 mL by IntraMUSCular route once for 1 dose. Follow-up and Dispositions    · Return in about 3 months (around 4/11/2022) for ADD. Medication risks/benefits/costs/interactions/alternatives discussed with patient. Advised patient to call back or return to office if symptoms worsen/change/persist.  If patient cannot reach us or should anything more severe/urgent arise he/she should proceed directly to the nearest emergency department. Discussed expected course/resolution/complications of diagnosis in detail with patient. Patient given a written after visit summary which includes diagnoses, current medications and vitals. Patient expressed understanding with the diagnosis and plan.     Written by meek Schwartz, as dictated by Carrie Dacosta M.D.

## 2022-01-11 NOTE — PROGRESS NOTES
Chief Complaint   Patient presents with    Attention Deficit Disorder   TSH    1. Have you been to the ER, urgent care clinic since your last visit? Hospitalized since your last visit?no    2. Have you seen or consulted any other health care providers outside of the [address] [place] [address] since your last visit? Include any pap smears or colon screening.  no

## 2022-03-13 DIAGNOSIS — E78.2 MIXED HYPERLIPIDEMIA: ICD-10-CM

## 2022-03-13 RX ORDER — ATORVASTATIN CALCIUM 10 MG/1
TABLET, FILM COATED ORAL
Qty: 90 TABLET | Refills: 1 | Status: SHIPPED | OUTPATIENT
Start: 2022-03-13 | End: 2022-09-09

## 2022-03-15 ENCOUNTER — OFFICE VISIT (OUTPATIENT)
Dept: FAMILY MEDICINE CLINIC | Age: 52
End: 2022-03-15
Payer: COMMERCIAL

## 2022-03-15 VITALS
SYSTOLIC BLOOD PRESSURE: 98 MMHG | WEIGHT: 205.4 LBS | HEART RATE: 72 BPM | BODY MASS INDEX: 26.36 KG/M2 | HEIGHT: 74 IN | OXYGEN SATURATION: 99 % | TEMPERATURE: 97.7 F | DIASTOLIC BLOOD PRESSURE: 78 MMHG | RESPIRATION RATE: 16 BRPM

## 2022-03-15 DIAGNOSIS — F98.8 ATTENTION DEFICIT DISORDER (ADD) WITHOUT HYPERACTIVITY: Primary | ICD-10-CM

## 2022-03-15 DIAGNOSIS — L82.1 SK (SEBORRHEIC KERATOSIS): ICD-10-CM

## 2022-03-15 PROCEDURE — 99213 OFFICE O/P EST LOW 20 MIN: CPT | Performed by: FAMILY MEDICINE

## 2022-03-15 RX ORDER — EMOLLIENT COMBINATION NO.43
CREAM (GRAM) TOPICAL
Qty: 30 G | Refills: 0 | Status: SHIPPED | OUTPATIENT
Start: 2022-03-15 | End: 2022-07-26

## 2022-03-15 NOTE — PROGRESS NOTES
Chief Complaint   Patient presents with    Thyroid Problem    Cholesterol Problem    Attention Deficit Disorder     1. \"Have you been to the ER, urgent care clinic since your last visit? Hospitalized since your last visit?\"     2. \"Have you seen or consulted any other health care providers outside of the [address] [place] [address] since your last visit? \"      3. For patients aged 39-70: Has the patient had a colonoscopy / FIT/ Cologuard? 10.15.2020      If the patient is female:    4. For patients aged 41-77: Has the patient had a mammogram within the past 2 years? 5. For patients aged 21-65: Has the patient had a pap smear?

## 2022-03-15 NOTE — PROGRESS NOTES
KIKE  Lori Sosa 46 y.o. male  presents to the office today for a follow up on hypothyroidism, ADD, and hyperlipidemia. Hyperlipidemia: Lipid panel on 1/13/22 notable for total cholesterol 191, HDL 50, , and triglycerides 90. Pt continues with atorvastatin 10mg daily. Hypothyroidism: Pt's TSH level was 1.08 on 1/13/22. Pt continues with levothyroxine 137mcg. Attention Deficit Disorder (ADD): Pt has a MHx of ADD and is currently on Vyvanse 30mg BID-- requesting refills. Pt notes he has 18 days of medication left at home and will need refills by 4/1/22. Seborrheic Keratosis: Pt requesting refills of Promiseb for seborrheic keratosis. He typically gets this at dermatologist.      Health Maintenance: Pt has prescription for Shingles vaccine and plans to get it soon. Pt notes upcoming appointment with endocrinologist, Dr. Nicole Pickering, and is requesting his recent labs be sent to [facility]. Blood pressure 98/78, pulse 72, temperature 97.7 °F (36.5 °C), temperature source Temporal, resp. rate 16, height 6' 2\" (1.88 m), weight 205 lb 6.4 oz (93.2 kg), SpO2 99 %. Body mass index is 26.37 kg/m². Chief Complaint   Patient presents with    Thyroid Problem    Cholesterol Problem    Attention Deficit Disorder        Current Outpatient Medications   Medication Sig Dispense Refill    [START ON 4/1/2022] lisdexamfetamine (Vyvanse) 30 mg capsule Take 1 Capsule by mouth two (2) times a day. Max Daily Amount: 60 mg. 60 Capsule 0    [START ON 5/1/2022] lisdexamfetamine (VYVANSE) 30 mg capsule Take 1 Capsule by mouth two (2) times a day. Max Daily Amount: 60 mg. 60 Capsule 0    [START ON 6/1/2022] lisdexamfetamine (VYVANSE) 30 mg capsule Take 1 Capsule by mouth two (2) times a day.  Max Daily Amount: 60 mg. 60 Capsule 0    Emollient Combination No.43 (Promiseb) crea Apply twice daily 30 g 0    atorvastatin (LIPITOR) 10 mg tablet TAKE 1 TABLET BY MOUTH EVERY DAY 90 Tablet 1    multivit-mins no.63/iron/folic (M-VIT PO) Take  by mouth.  levothyroxine (UNITHROID) 137 mcg tablet Take 137 mcg by mouth Daily (before breakfast). 90 Tab 1    Cholecalciferol, Vitamin D3, (VITAMIN D3) 2,000 unit cap capsule Take  by mouth daily. Allergies   Allergen Reactions    Soy Anaphylaxis     History reviewed. No pertinent past medical history. Past Surgical History:   Procedure Laterality Date    HX VASECTOMY       Family History   Problem Relation Age of Onset    High Cholesterol Father     Migraines Sister     Thyroid Disease Sister      Social History     Tobacco Use    Smoking status: Never Smoker    Smokeless tobacco: Never Used   Substance Use Topics    Alcohol use: Yes        Review of Systems   Constitutional: Negative for chills and fever. HENT: Negative for hearing loss and tinnitus. Eyes: Negative for blurred vision and double vision. Respiratory: Negative for cough and shortness of breath. Cardiovascular: Negative for chest pain and palpitations. Gastrointestinal: Negative for nausea and vomiting. Genitourinary: Negative for dysuria and frequency. Musculoskeletal: Negative for back pain and falls. Skin: Negative for itching and rash. Neurological: Negative for dizziness, loss of consciousness and headaches. Endo/Heme/Allergies: Negative. Psychiatric/Behavioral: Negative for depression. The patient is not nervous/anxious. Physical Exam  Vitals reviewed. Constitutional:       Appearance: Normal appearance. HENT:      Head: Normocephalic and atraumatic. Right Ear: Tympanic membrane, ear canal and external ear normal.      Left Ear: Tympanic membrane, ear canal and external ear normal.      Nose: Nose normal.      Mouth/Throat:      Mouth: Mucous membranes are moist.      Pharynx: Oropharynx is clear. Eyes:      Extraocular Movements: Extraocular movements intact.       Conjunctiva/sclera: Conjunctivae normal.      Pupils: Pupils are equal, round, and reactive to light. Cardiovascular:      Rate and Rhythm: Normal rate and regular rhythm. Pulses: Normal pulses. Carotid pulses are 2+ on the right side and 2+ on the left side. Heart sounds: Normal heart sounds. Pulmonary:      Effort: Pulmonary effort is normal.      Breath sounds: Normal breath sounds. Abdominal:      General: Abdomen is flat. Bowel sounds are normal.      Palpations: Abdomen is soft. Musculoskeletal:         General: Normal range of motion. Cervical back: Normal range of motion and neck supple. Skin:     General: Skin is warm and dry. Neurological:      General: No focal deficit present. Mental Status: He is alert and oriented to person, place, and time. Psychiatric:         Mood and Affect: Mood normal.         Behavior: Behavior normal.         Judgment: Judgment normal.           ASSESSMENT and PLAN  Diagnoses and all orders for this visit:    1. Attention deficit disorder (ADD) without hyperactivity  -     lisdexamfetamine (Vyvanse) 30 mg capsule; Take 1 Capsule by mouth two (2) times a day. Max Daily Amount: 60 mg.  -     lisdexamfetamine (VYVANSE) 30 mg capsule; Take 1 Capsule by mouth two (2) times a day. Max Daily Amount: 60 mg.  -     lisdexamfetamine (VYVANSE) 30 mg capsule; Take 1 Capsule by mouth two (2) times a day. Max Daily Amount: 60 mg. Pt continues with Vyvanse 30mg BID. Pt requests a refill of their medication, which I have granted. 2. SK (seborrheic keratosis)  -     Emollient Combination No.43 (Promiseb) crea; Apply twice daily  I've refilled pt's Promiseb for SK. Follow-up and Dispositions    · Return in about 3 months (around 6/27/2022) for ADD. Medication risks/benefits/costs/interactions/alternatives discussed with patient.   Advised patient to call back or return to office if symptoms worsen/change/persist.  If patient cannot reach us or should anything more severe/urgent arise he/she should proceed directly to the nearest emergency department. Discussed expected course/resolution/complications of diagnosis in detail with patient. Patient given a written after visit summary which includes diagnoses, current medications and vitals. Patient expressed understanding with the diagnosis and plan. Written by meek Blank, as dictated by Jesika Maloney M.D.    8:16 AM - 9:00 AM      Total time spent with the patient 15 minutes, greater than 50% of time spent counseling patient.

## 2022-03-15 NOTE — PROGRESS NOTES
Chief Complaint   Patient presents with    Thyroid Problem    Cholesterol Problem    Attention Deficit Disorder         1. \"Have you been to the ER, urgent care clinic since your last visit? Hospitalized since your last visit? \" No    2. \"Have you seen or consulted any other health care providers outside of the [address] [place] [address] since your last visit? \" No     3. For patients over 45: Has the patient had a colonoscopy?  Yes - no Care Gap present     3 most recent PHQ Screens 3/15/2022   Little interest or pleasure in doing things Not at all   Feeling down, depressed, irritable, or hopeless Not at all   Total Score PHQ 2 0       Health Maintenance Due   Topic Date Due    Shingrix Vaccine Age 50> (1 of 2) Never done

## 2022-03-18 PROBLEM — E66.3 OVERWEIGHT (BMI 25.0-29.9): Status: ACTIVE | Noted: 2017-05-10

## 2022-03-19 PROBLEM — F98.8 ADD (ATTENTION DEFICIT DISORDER): Status: ACTIVE | Noted: 2017-05-10

## 2022-03-19 PROBLEM — E03.9 ACQUIRED HYPOTHYROIDISM: Status: ACTIVE | Noted: 2017-01-04

## 2022-06-15 ENCOUNTER — OFFICE VISIT (OUTPATIENT)
Dept: FAMILY MEDICINE CLINIC | Age: 52
End: 2022-06-15
Payer: COMMERCIAL

## 2022-06-15 VITALS
OXYGEN SATURATION: 100 % | TEMPERATURE: 97.8 F | HEART RATE: 72 BPM | RESPIRATION RATE: 18 BRPM | DIASTOLIC BLOOD PRESSURE: 79 MMHG | HEIGHT: 74 IN | WEIGHT: 201.8 LBS | SYSTOLIC BLOOD PRESSURE: 113 MMHG | BODY MASS INDEX: 25.9 KG/M2

## 2022-06-15 DIAGNOSIS — L74.0 HEAT RASH: Primary | ICD-10-CM

## 2022-06-15 PROCEDURE — 99213 OFFICE O/P EST LOW 20 MIN: CPT | Performed by: NURSE PRACTITIONER

## 2022-06-15 NOTE — PROGRESS NOTES
[facility] Note  Subjective:      Flaco Rivas is a 46 y.o. male who presents for rash on her upper chest x 2 days. States that ids sensitive to touch, no pain. Past Surgical History:   Procedure Laterality Date    HX VASECTOMY       Current Outpatient Medications   Medication Sig Dispense Refill    lisdexamfetamine (Vyvanse) 30 mg capsule Take 1 Capsule by mouth two (2) times a day. Max Daily Amount: 60 mg. 60 Capsule 0    atorvastatin (LIPITOR) 10 mg tablet TAKE 1 TABLET BY MOUTH EVERY DAY 90 Tablet 1    multivit-mins no.63/iron/folic (M-VIT PO) Take  by mouth.  levothyroxine (UNITHROID) 137 mcg tablet Take 137 mcg by mouth Daily (before breakfast). 90 Tab 1    Cholecalciferol, Vitamin D3, (VITAMIN D3) 2,000 unit cap capsule Take  by mouth daily.  lisdexamfetamine (VYVANSE) 30 mg capsule Take 1 Capsule by mouth two (2) times a day. Max Daily Amount: 60 mg. (Patient not taking: Reported on 6/15/2022) 60 Capsule 0    lisdexamfetamine (VYVANSE) 30 mg capsule Take 1 Capsule by mouth two (2) times a day. Max Daily Amount: 60 mg. (Patient not taking: Reported on 6/15/2022) 60 Capsule 0    Emollient Combination No.43 (Promiseb) crea Apply twice daily (Patient not taking: Reported on 6/15/2022) 30 g 0     Allergies   Allergen Reactions    Soy Anaphylaxis       ROS:   Complete review of systems was reviewed with pertinent information listed in HPI. Review of Systems   Constitutional: Negative. Respiratory: Negative. Cardiovascular: Negative. Gastrointestinal: Negative. Genitourinary: Negative. Musculoskeletal: Negative. Skin: Positive for rash. Objective:     Visit Vitals  /79 (BP 1 Location: Right arm, BP Patient Position: Sitting, BP Cuff Size: Adult)   Pulse 72   Temp 97.8 °F (36.6 °C) (Temporal)   Resp 18   Ht 6' 2\" (1.88 m)   Wt 201 lb 12.8 oz (91.5 kg)   SpO2 100%   BMI 25.91 kg/m²       Vitals and Nurse Documentation reviewed. Physical Exam  Constitutional:       Appearance: Normal appearance. HENT:      Mouth/Throat:      Mouth: Mucous membranes are moist.   Cardiovascular:      Rate and Rhythm: Normal rate and regular rhythm. Pulses: Normal pulses. Heart sounds: Normal heart sounds. No murmur heard. Pulmonary:      Effort: Pulmonary effort is normal.      Breath sounds: Normal breath sounds. Abdominal:      General: Bowel sounds are normal.      Palpations: Abdomen is soft. Musculoskeletal:      Cervical back: Normal range of motion and neck supple. Skin:     Findings: No erythema. Comments: Erythematous rash on his upper chest, both sides   Neurological:      Mental Status: He is alert. Psychiatric:         Mood and Affect: Mood normal.         Thought Content: Thought content normal.         Assessment/Plan:     Diagnoses and all orders for this visit:    1.  Heat rash  Apply cortisone cream  Stay indoor, avoid sweating  Call if not improved        Pt expressed understanding with the diagnosis and plan        Discussed expected course/resolution/complications of diagnosis in detail with patient.    Medication risks/benefits/costs/interactions/alternatives discussed with patient.    Pt was given an after visit summary which includes diagnoses, current medications & vitals.  Pt expressed understanding with the diagnosis and plan

## 2022-06-15 NOTE — PROGRESS NOTES
Chief Complaint   Patient presents with    Rash     possible shingles     1. Have you been to the ER, urgent care clinic since your last visit? Hospitalized since your last visit? No    2. Have you seen or consulted any other health care providers outside of the [address] [place] [address] since your last visit? Include any pap smears or colon screening.  No

## 2022-06-27 ENCOUNTER — OFFICE VISIT (OUTPATIENT)
Dept: FAMILY MEDICINE CLINIC | Age: 52
End: 2022-06-27
Payer: COMMERCIAL

## 2022-06-27 VITALS
HEIGHT: 74 IN | OXYGEN SATURATION: 99 % | HEART RATE: 72 BPM | TEMPERATURE: 97.6 F | SYSTOLIC BLOOD PRESSURE: 126 MMHG | BODY MASS INDEX: 25.93 KG/M2 | WEIGHT: 202 LBS | DIASTOLIC BLOOD PRESSURE: 72 MMHG | RESPIRATION RATE: 16 BRPM

## 2022-06-27 DIAGNOSIS — E78.2 MIXED HYPERLIPIDEMIA: ICD-10-CM

## 2022-06-27 DIAGNOSIS — F98.8 ATTENTION DEFICIT DISORDER (ADD) WITHOUT HYPERACTIVITY: Primary | ICD-10-CM

## 2022-06-27 DIAGNOSIS — E55.9 VITAMIN D DEFICIENCY: ICD-10-CM

## 2022-06-27 DIAGNOSIS — Z23 ENCOUNTER FOR IMMUNIZATION: ICD-10-CM

## 2022-06-27 DIAGNOSIS — D36.7 DERMOID CYST OF RIGHT UPPER EXTREMITY: ICD-10-CM

## 2022-06-27 DIAGNOSIS — E03.9 ACQUIRED HYPOTHYROIDISM: ICD-10-CM

## 2022-06-27 LAB
25(OH)D3 SERPL-MCNC: 37.6 NG/ML (ref 30–100)
ALBUMIN SERPL-MCNC: 4 G/DL (ref 3.5–5)
ALBUMIN/GLOB SERPL: 1.4 {RATIO} (ref 1.1–2.2)
ALP SERPL-CCNC: 57 U/L (ref 45–117)
ALT SERPL-CCNC: 35 U/L (ref 12–78)
ANION GAP SERPL CALC-SCNC: 4 MMOL/L (ref 5–15)
AST SERPL-CCNC: 23 U/L (ref 15–37)
BASOPHILS # BLD: 0.1 K/UL (ref 0–0.1)
BASOPHILS NFR BLD: 1 % (ref 0–1)
BILIRUB SERPL-MCNC: 0.7 MG/DL (ref 0.2–1)
BUN SERPL-MCNC: 16 MG/DL (ref 6–20)
BUN/CREAT SERPL: 15 (ref 12–20)
CALCIUM SERPL-MCNC: 9.2 MG/DL (ref 8.5–10.1)
CHLORIDE SERPL-SCNC: 104 MMOL/L (ref 97–108)
CHOLEST SERPL-MCNC: 187 MG/DL
CO2 SERPL-SCNC: 30 MMOL/L (ref 21–32)
CREAT SERPL-MCNC: 1.06 MG/DL (ref 0.7–1.3)
DIFFERENTIAL METHOD BLD: NORMAL
EOSINOPHIL # BLD: 0.4 K/UL (ref 0–0.4)
EOSINOPHIL NFR BLD: 7 % (ref 0–7)
ERYTHROCYTE [DISTWIDTH] IN BLOOD BY AUTOMATED COUNT: 12.6 % (ref 11.5–14.5)
GLOBULIN SER CALC-MCNC: 2.8 G/DL (ref 2–4)
GLUCOSE SERPL-MCNC: 113 MG/DL (ref 65–100)
HCT VFR BLD AUTO: 43.9 % (ref 36.6–50.3)
HDLC SERPL-MCNC: 61 MG/DL
HDLC SERPL: 3.1 {RATIO} (ref 0–5)
HGB BLD-MCNC: 14.5 G/DL (ref 12.1–17)
IMM GRANULOCYTES # BLD AUTO: 0 K/UL (ref 0–0.04)
IMM GRANULOCYTES NFR BLD AUTO: 0 % (ref 0–0.5)
LDLC SERPL CALC-MCNC: 110.6 MG/DL (ref 0–100)
LYMPHOCYTES # BLD: 1.6 K/UL (ref 0.8–3.5)
LYMPHOCYTES NFR BLD: 27 % (ref 12–49)
MCH RBC QN AUTO: 30.3 PG (ref 26–34)
MCHC RBC AUTO-ENTMCNC: 33 G/DL (ref 30–36.5)
MCV RBC AUTO: 91.8 FL (ref 80–99)
MONOCYTES # BLD: 0.5 K/UL (ref 0–1)
MONOCYTES NFR BLD: 8 % (ref 5–13)
NEUTS SEG # BLD: 3.2 K/UL (ref 1.8–8)
NEUTS SEG NFR BLD: 57 % (ref 32–75)
NRBC # BLD: 0 K/UL (ref 0–0.01)
NRBC BLD-RTO: 0 PER 100 WBC
PLATELET # BLD AUTO: 224 K/UL (ref 150–400)
PMV BLD AUTO: 10.5 FL (ref 8.9–12.9)
POTASSIUM SERPL-SCNC: 4.5 MMOL/L (ref 3.5–5.1)
PROT SERPL-MCNC: 6.8 G/DL (ref 6.4–8.2)
RBC # BLD AUTO: 4.78 M/UL (ref 4.1–5.7)
SODIUM SERPL-SCNC: 138 MMOL/L (ref 136–145)
TRIGL SERPL-MCNC: 77 MG/DL (ref ?–150)
TSH SERPL DL<=0.05 MIU/L-ACNC: 0.48 UIU/ML (ref 0.36–3.74)
VLDLC SERPL CALC-MCNC: 15.4 MG/DL
WBC # BLD AUTO: 5.8 K/UL (ref 4.1–11.1)

## 2022-06-27 PROCEDURE — 99214 OFFICE O/P EST MOD 30 MIN: CPT | Performed by: FAMILY MEDICINE

## 2022-06-27 RX ORDER — ZOSTER VACCINE RECOMBINANT, ADJUVANTED 50 MCG/0.5
0.5 KIT INTRAMUSCULAR ONCE
Qty: 0.5 ML | Refills: 0 | Status: SHIPPED | OUTPATIENT
Start: 2022-06-27 | End: 2022-06-27

## 2022-06-27 NOTE — PROGRESS NOTES
HISTORY OF PRESENT ILLNESS  Danette Roldan is a 46 y.o. male. Blood pressure 126/72, pulse 72, temperature 97.6 °F (36.4 °C), temperature source Temporal, resp. rate 16, height 6' 2\" (1.88 m), weight 202 lb (91.6 kg), SpO2 99 %. Chief Complaint   Patient presents with    Cholesterol Problem    Thyroid Problem    Attention Deficit Disorder       HPI   Cyst on the left arm x 6 months's  Has gotten bigger  Some discomfort and numbness  Pt requested a referral to general surgery    Has come for ADD med refills    Has no other issues we will also get blood work today. Current Outpatient Medications   Medication Sig Dispense Refill    [START ON 8/27/2022] lisdexamfetamine (Vyvanse) 30 mg capsule Take 1 Capsule by mouth two (2) times a day. Max Daily Amount: 60 mg. 60 Capsule 0    varicella-zoster recombinant, PF, (Shingrix, PF,) 50 mcg/0.5 mL susr injection 0.5 mL by IntraMUSCular route once for 1 dose. 0.5 mL 0    [START ON 7/27/2022] lisdexamfetamine (VYVANSE) 30 mg capsule Take 1 Capsule by mouth two (2) times a day. Max Daily Amount: 60 mg. 60 Capsule 0    lisdexamfetamine (VYVANSE) 30 mg capsule Take 1 Capsule by mouth two (2) times a day. Max Daily Amount: 60 mg. 60 Capsule 0    atorvastatin (LIPITOR) 10 mg tablet TAKE 1 TABLET BY MOUTH EVERY DAY 90 Tablet 1    multivit-mins no.63/iron/folic (M-VIT PO) Take  by mouth.  levothyroxine (UNITHROID) 137 mcg tablet Take 137 mcg by mouth Daily (before breakfast). 90 Tab 1    Cholecalciferol, Vitamin D3, (VITAMIN D3) 2,000 unit cap capsule Take  by mouth daily.  Emollient Combination No.43 (Promiseb) crea Apply twice daily (Patient not taking: Reported on 6/15/2022) 30 g 0     Allergies   Allergen Reactions    Soy Anaphylaxis     History reviewed. No pertinent past medical history.   Past Surgical History:   Procedure Laterality Date    HX VASECTOMY       Family History   Problem Relation Age of Onset    High Cholesterol Father     Migraines Sister     Thyroid Disease Sister      Social History     Tobacco Use    Smoking status: Never Smoker    Smokeless tobacco: Never Used   Substance Use Topics    Alcohol use: Yes       Review of Systems   Constitutional: Negative. Negative for malaise/fatigue. Eyes: Negative for blurred vision. Respiratory: Negative for shortness of breath. Cardiovascular: Negative for chest pain and leg swelling. Musculoskeletal: Negative. Neurological: Negative. Negative for dizziness and headaches. All other systems reviewed and are negative. Physical Exam  Vitals and nursing note reviewed. Constitutional:       General: He is not in acute distress. Appearance: He is well-developed. He is not diaphoretic. HENT:      Head: Normocephalic and atraumatic. Neck:      Vascular: No carotid bruit. Cardiovascular:      Rate and Rhythm: Normal rate and regular rhythm. Heart sounds: Normal heart sounds. No murmur heard. No friction rub. No gallop. Pulmonary:      Effort: Pulmonary effort is normal. No respiratory distress. Breath sounds: Normal breath sounds. No wheezing or rales. Neurological:      Mental Status: He is alert and oriented to person, place, and time. Psychiatric:         Behavior: Behavior normal.         Thought Content: Thought content normal.         Judgment: Judgment normal.         ASSESSMENT and PLAN  Diagnoses and all orders for this visit:    1. Attention deficit disorder (ADD) without hyperactivity  Patient's medications have been refilled  -     CBC WITH AUTOMATED DIFF; Future  -     lisdexamfetamine (Vyvanse) 30 mg capsule; Take 1 Capsule by mouth two (2) times a day. Max Daily Amount: 60 mg.  -     lisdexamfetamine (VYVANSE) 30 mg capsule; Take 1 Capsule by mouth two (2) times a day. Max Daily Amount: 60 mg.  -     lisdexamfetamine (VYVANSE) 30 mg capsule; Take 1 Capsule by mouth two (2) times a day.  Max Daily Amount: 60 mg.    2. Mixed hyperlipidemia  Patient advised to continue current treatment regimen  -     METABOLIC PANEL, COMPREHENSIVE; Future  -     LIPID PANEL; Future    3. Acquired hypothyroidism  Advised patient we will check his thyroid panel today  -     TSH 3RD GENERATION; Future    4. Vitamin D deficiency  Check vitamin D levels today  -     VITAMIN D, 25 HYDROXY; Future    5. Encounter for immunization  -     varicella-zoster recombinant, PF, (Shingrix, PF,) 50 mcg/0.5 mL susr injection; 0.5 mL by IntraMUSCular route once for 1 dose. 6. Dermoid cyst of right upper extremity  -     REFERRAL TO GENERAL SURGERY  Pt referred to Dr. Ben Mera  For evaluation      Follow-up and Dispositions    · Return in about 3 months (around 9/27/2022) for ADD. Medication risks/benefits/costs/interactions/alternatives discussed with patient. Advised patient to call back or return to office if symptoms worsen/change/persist.  If patient cannot reach us or should anything more severe/urgent arise he/she should proceed directly to the nearest emergency department. Discussed expected course/resolution/complications of diagnosis in detail with patient. Patient given a written after visit summary which includes her diagnoses, current medications and vitals. Patient expressed understanding with the diagnosis and plan.

## 2022-06-27 NOTE — PROGRESS NOTES
Chief Complaint   Patient presents with    Cholesterol Problem    Thyroid Problem    Attention Deficit Disorder     1. \"Have you been to the ER, urgent care clinic since your last visit? Hospitalized since your last visit? \" no    2. \"Have you seen or consulted any other health care providers outside of the [address] [place] [address] since your last visit? \"  no    3. For patients aged 39-70: Has the patient had a colonoscopy / FIT/ Cologuard? If the patient is female:    4. For patients aged 41-77: Has the patient had a mammogram within the past 2 years? 5. For patients aged 21-65: Has the patient had a pap smear?

## 2022-06-29 ENCOUNTER — TELEPHONE (OUTPATIENT)
Dept: SURGERY | Age: 52
End: 2022-06-29

## 2022-06-29 NOTE — TELEPHONE ENCOUNTER
Attempted to call patient in regards to a referral we received on his behalf from [facility]. We had to leave a voicemail.

## 2022-07-26 ENCOUNTER — OFFICE VISIT (OUTPATIENT)
Dept: FAMILY MEDICINE CLINIC | Age: 52
End: 2022-07-26
Payer: COMMERCIAL

## 2022-07-26 VITALS
TEMPERATURE: 97.8 F | DIASTOLIC BLOOD PRESSURE: 68 MMHG | RESPIRATION RATE: 16 BRPM | SYSTOLIC BLOOD PRESSURE: 120 MMHG | HEIGHT: 74 IN | OXYGEN SATURATION: 99 % | WEIGHT: 201 LBS | BODY MASS INDEX: 25.8 KG/M2 | HEART RATE: 67 BPM
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
## 2022-07-26 PROCEDURE — 99213 OFFICE O/P EST LOW 20 MIN: CPT | Performed by: FAMILY MEDICINE

## 2022-07-26 RX ORDER — DOXYCYCLINE 100 MG/1
100 TABLET ORAL 2 TIMES DAILY
Qty: 30 TABLET | Refills: 0 | Status: SHIPPED | OUTPATIENT
Start: 2022-07-26 | End: 2022-08-10

## 2022-07-26 NOTE — PROGRESS NOTES
HPI  Rajendra Thomason 46 y.o. male  presents to the office today for lab work. Blood pressure 120/68, pulse 67, temperature 97.8 °F (36.6 °C), temperature source Temporal, resp. rate 16, height 6' 2\" (1.88 m), weight 201 lb (91.2 kg), SpO2 99 %. Body mass index is 25.81 kg/m². Chief Complaint   Patient presents with    Labs     For lyme      Tick bite: Pt reports hiking last Wednesday and got several chigger bites on lower legs. He also had a tick bite on his lower left leg. Pt has been applying hydrocortisone and calamine lotion. He would like to be tested for Lyme disease and [facility] spotted fever. I have ordered these labs. I also recommended pt start an antibiotic. I prescribed doxycyline 100mg BID x15 days. Pt encouraged to continue with hydrocortisone. Pt advised to hold his multivitamin while on the antibiotic. Current Outpatient Medications   Medication Sig Dispense Refill    doxycycline (ADOXA) 100 mg tablet Take 1 Tablet by mouth two (2) times a day for 15 days. 30 Tablet 0    [START ON 8/27/2022] lisdexamfetamine (Vyvanse) 30 mg capsule Take 1 Capsule by mouth two (2) times a day. Max Daily Amount: 60 mg. 60 Capsule 0    atorvastatin (LIPITOR) 10 mg tablet TAKE 1 TABLET BY MOUTH EVERY DAY 90 Tablet 1    multivit-mins no.63/iron/folic (M-VIT PO) Take  by mouth.      levothyroxine (UNITHROID) 137 mcg tablet Take 137 mcg by mouth Daily (before breakfast). 90 Tab 1    cholecalciferol (VITAMIN D3) (2,000 UNITS /50 MCG) cap capsule Take  by mouth daily. Allergies   Allergen Reactions    Soy Anaphylaxis     History reviewed. No pertinent past medical history. Past Surgical History:   Procedure Laterality Date    HX VASECTOMY       Family History   Problem Relation Age of Onset    High Cholesterol Father     Migraines Sister     Thyroid Disease Sister      Social History     Tobacco Use    Smoking status: Never    Smokeless tobacco: Never   Substance Use Topics    Alcohol use:  Yes Review of Systems   Constitutional:  Negative for chills and fever. HENT:  Negative for hearing loss and tinnitus. Eyes:  Negative for blurred vision and double vision. Respiratory:  Negative for cough and shortness of breath. Cardiovascular:  Negative for chest pain and palpitations. Gastrointestinal:  Negative for nausea and vomiting. Genitourinary:  Negative for dysuria and frequency. Musculoskeletal:  Negative for back pain and falls. Skin:  Negative for itching and rash. Various bug bites    Neurological:  Negative for dizziness, loss of consciousness and headaches. Endo/Heme/Allergies: Negative. Psychiatric/Behavioral:  Negative for depression. The patient is not nervous/anxious. Physical Exam  Vitals reviewed. Constitutional:       Appearance: Normal appearance. HENT:      Head: Normocephalic and atraumatic. Right Ear: Tympanic membrane, ear canal and external ear normal.      Left Ear: Tympanic membrane, ear canal and external ear normal.      Nose: Nose normal.      Mouth/Throat:      Mouth: Mucous membranes are moist.      Pharynx: Oropharynx is clear. Eyes:      Extraocular Movements: Extraocular movements intact. Conjunctiva/sclera: Conjunctivae normal.      Pupils: Pupils are equal, round, and reactive to light. Cardiovascular:      Rate and Rhythm: Normal rate and regular rhythm. Pulses: Normal pulses. Heart sounds: Normal heart sounds. Pulmonary:      Effort: Pulmonary effort is normal.      Breath sounds: Normal breath sounds. Abdominal:      General: Abdomen is flat. Bowel sounds are normal.      Palpations: Abdomen is soft. Musculoskeletal:         General: Normal range of motion. Cervical back: Normal range of motion and neck supple. Skin:     General: Skin is warm and dry. Neurological:      General: No focal deficit present. Mental Status: He is alert and oriented to person, place, and time. Psychiatric:         Mood and Affect: Mood normal.         Behavior: Behavior normal.         Judgment: Judgment normal.       ASSESSMENT and PLAN  Diagnoses and all orders for this visit:    1. Tick bite, unspecified site, initial encounter  I have ordered labs to test for Lyme disease and [facility] spotted fever. I also recommended pt start an antibiotic. I prescribed doxycyline 100mg BID x15 days. Pt encouraged to continue with hydrocortisone. Pt advised to hold his multivitamin while on the antibiotic. -     R RICKETTSII IGM; Future  -     R RICKETTSII, IGG BY IFA REFLEX; Future  -     LYME AB, IGG & IGM BY WB; Future  -     doxycycline (ADOXA) 100 mg tablet; Take 1 Tablet by mouth two (2) times a day for 15 days. Follow-up and Dispositions    Return if symptoms worsen or fail to improve, for tick bites. Medication risks/benefits/costs/interactions/alternatives discussed with patient. Advised patient to call back or return to office if symptoms worsen/change/persist.  If patient cannot reach us or should anything more severe/urgent arise he/she should proceed directly to the nearest emergency department. Discussed expected course/resolution/complications of diagnosis in detail with patient. Patient given a written after visit summary which includes diagnoses, current medications and vitals. Patient expressed understanding with the diagnosis and plan. Written by meek Walker, as dictated by Angelica Petersen M.D.    11:15 AM- 11:27 AM    Total time spent with the patient 10 minutes, greater than 50% of time spent counseling patient.

## 2022-07-26 NOTE — PROGRESS NOTES
Chief Complaint   Patient presents with    Labs     For lyme      1. \"Have you been to the ER, urgent care clinic since your last visit? Hospitalized since your last visit? \" No     2. \"Have you seen or consulted any other health care providers outside of the [address] [place] [address] since your last visit? \"  No     3. For patients aged 39-70: Has the patient had a colonoscopy / FIT/ Cologuard? NO GAP      If the patient is female:    4. For patients aged 41-77: Has the patient had a mammogram within the past 2 years? 5. For patients aged 21-65: Has the patient had a pap smear?

## 2022-07-27 ENCOUNTER — OFFICE VISIT (OUTPATIENT)
Dept: SURGERY | Age: 52
End: 2022-07-27
Payer: COMMERCIAL

## 2022-07-27 VITALS
DIASTOLIC BLOOD PRESSURE: 82 MMHG | TEMPERATURE: 98.5 F | HEART RATE: 72 BPM | WEIGHT: 200.4 LBS | OXYGEN SATURATION: 98 % | HEIGHT: 75 IN | BODY MASS INDEX: 24.92 KG/M2 | RESPIRATION RATE: 18 BRPM | SYSTOLIC BLOOD PRESSURE: 130 MMHG

## 2022-07-27 DIAGNOSIS — D17.21 LIPOMA OF RIGHT UPPER EXTREMITY: Primary | ICD-10-CM

## 2022-07-27 LAB
COMMENT, HOLDF: NORMAL
SAMPLES BEING HELD,HOLD: NORMAL

## 2022-07-27 PROCEDURE — 99202 OFFICE O/P NEW SF 15 MIN: CPT | Performed by: SURGERY

## 2022-07-27 NOTE — PROGRESS NOTES
1. Have you been to the ER, urgent care clinic since your last visit? Hospitalized since your last visit? No    2. Have you seen or consulted any other health care providers outside of the [address] [place] [address] since your last visit? Include any pap smears or colon screening.  No

## 2022-07-27 NOTE — PROGRESS NOTES
Malina Nicole is a 46 y.o. male who is referred by Dr. Sandra Santana for further evaluation of multiple lipomas of the right upper extremity. Mr. Kimmy Lozada tells me that he noted small, subcutaneous masses on the posterior aspect of his right upper arm several months ago. No significant change in the size of the masses. However, they are becoming more bothersome to him. No associated drainage or bleeding. Mr. Kimmy Lozada has also had a subcutaneous mass at the lateral aspect of the distal right upper arm for some time. No significant change in the size of the mass but it is somewhat bothersome to him. Found to have multiple lipomas. Of note, Mr. Kimmy Lozada has had lipomas removed in the past.   He has otherwise been in his usual state of health. Past Medical History:   Diagnosis Date    Hypercholesterolemia     Lipoma of right upper extremity 7/27/2022    Thyroid disease      Past Surgical History:   Procedure Laterality Date    HX VASECTOMY       Family History   Problem Relation Age of Onset    High Cholesterol Father     Migraines Sister     Thyroid Disease Sister      Social History     Socioeconomic History    Marital status:    Tobacco Use    Smoking status: Never    Smokeless tobacco: Never   Vaping Use    Vaping Use: Never used   Substance and Sexual Activity    Alcohol use: Yes    Drug use: No    Sexual activity: Yes     Partners: Female     Birth control/protection: Surgical     Review of systems negative except as noted. Review of Systems   Musculoskeletal:         Discomfort at site of lipomas. Physical Exam  Vitals reviewed. Constitutional:       General: He is not in acute distress. Appearance: Normal appearance. He is normal weight. HENT:      Head: Normocephalic and atraumatic. Eyes:      General: No scleral icterus. Cardiovascular:      Rate and Rhythm: Normal rate and regular rhythm. Pulmonary:      Effort: Pulmonary effort is normal.      Breath sounds: Normal breath sounds. Abdominal:      General: There is no distension. Palpations: Abdomen is soft. Tenderness: There is no abdominal tenderness. There is no guarding or rebound. Musculoskeletal:         General: Normal range of motion. Arms:       Cervical back: Neck supple. Comments: On the posterior aspect of the distal right upper arm, there are two, small, approximately 1 cm x 1 cm well circumscribed, freely movable subcutaneous masses which are c/w lipomas. On the lateral aspect of the distal right upper arm, there is an approximately 4 cm x 4 cm, well circumscribed, freely movable subcutaneous mass which is c/w a lipoma. Lymphadenopathy:      Cervical: No cervical adenopathy. Neurological:      General: No focal deficit present. Mental Status: He is alert. ASSESSMENT and PLAN  Mr. Valery Akhtar is a 47 yo man with multiple lipomas of the right upper arm. In view of the findings on H and P, he should benefit from excision of the lipomas as they are bothersome to him. Discussed procedure with Mr. Valery Akhtar including risks of bleeding, infection, need for further surgery, recurrence. He understands and wishes to proceed. I have tentatively scheduled Mr. Campos for surgery on October 7, 2022 at [facility] and will see him back in the office postoperatively. Follow up with Dr. Marifer Carvalho as scheduled. Discussed plan with Mr. Valery Akhtar and he is agreeable.        CC: Genet Mccormack MD

## 2022-07-28 ENCOUNTER — PATIENT MESSAGE (OUTPATIENT)
Dept: SURGERY | Age: 52
End: 2022-07-28

## 2022-07-29 LAB
B BURGDOR IGG PATRN SER IB-IMP: NEGATIVE
B BURGDOR IGM PATRN SER IB-IMP: NEGATIVE
B BURGDOR18KD IGG SER QL IB: ABNORMAL
B BURGDOR23KD IGG SER QL IB: ABNORMAL
B BURGDOR23KD IGM SER QL IB: PRESENT
B BURGDOR28KD IGG SER QL IB: ABNORMAL
B BURGDOR30KD IGG SER QL IB: ABNORMAL
B BURGDOR39KD IGG SER QL IB: ABNORMAL
B BURGDOR39KD IGM SER QL IB: ABNORMAL
B BURGDOR41KD IGG SER QL IB: ABNORMAL
B BURGDOR41KD IGM SER QL IB: ABNORMAL
B BURGDOR45KD IGG SER QL IB: ABNORMAL
B BURGDOR58KD IGG SER QL IB: ABNORMAL
B BURGDOR66KD IGG SER QL IB: ABNORMAL
B BURGDOR93KD IGG SER QL IB: ABNORMAL

## 2022-07-30 LAB — R RICKETTSI IGM SER-ACNC: 0.47 INDEX (ref 0–0.89)

## 2022-08-03 LAB
R RICKETTSI IGG SER QL IA: POSITIVE
R RICKETTSI IGG TITR SER IF: NORMAL {TITER}

## 2022-09-07 DIAGNOSIS — E78.2 MIXED HYPERLIPIDEMIA: ICD-10-CM

## 2022-09-09 RX ORDER — ATORVASTATIN CALCIUM 10 MG/1
TABLET, FILM COATED ORAL
Qty: 90 TABLET | Refills: 0 | Status: SHIPPED | OUTPATIENT
Start: 2022-09-09

## 2022-09-14 RX ORDER — ACETAMINOPHEN 325 MG/1
1000 TABLET ORAL ONCE
OUTPATIENT
Start: 2022-09-14 | End: 2022-09-14

## 2022-09-14 RX ORDER — BUPIVACAINE HYDROCHLORIDE 2.5 MG/ML
30 INJECTION, SOLUTION EPIDURAL; INFILTRATION; INTRACAUDAL ONCE
OUTPATIENT
Start: 2022-09-14 | End: 2022-09-14

## 2022-09-27 ENCOUNTER — OFFICE VISIT (OUTPATIENT)
Dept: FAMILY MEDICINE CLINIC | Age: 52
End: 2022-09-27
Payer: COMMERCIAL

## 2022-09-27 VITALS
SYSTOLIC BLOOD PRESSURE: 120 MMHG | RESPIRATION RATE: 16 BRPM | HEIGHT: 75 IN | BODY MASS INDEX: 24.37 KG/M2 | WEIGHT: 196 LBS | DIASTOLIC BLOOD PRESSURE: 60 MMHG | TEMPERATURE: 97.7 F | OXYGEN SATURATION: 99 % | HEART RATE: 67 BPM

## 2022-09-27 DIAGNOSIS — Z23 NEEDS FLU SHOT: ICD-10-CM

## 2022-09-27 DIAGNOSIS — Z51.81 MEDICATION MONITORING ENCOUNTER: ICD-10-CM

## 2022-09-27 DIAGNOSIS — F98.8 ATTENTION DEFICIT DISORDER (ADD) WITHOUT HYPERACTIVITY: Primary | ICD-10-CM

## 2022-09-27 PROCEDURE — 99213 OFFICE O/P EST LOW 20 MIN: CPT | Performed by: FAMILY MEDICINE

## 2022-09-27 PROCEDURE — 90471 IMMUNIZATION ADMIN: CPT | Performed by: FAMILY MEDICINE

## 2022-09-27 PROCEDURE — 90686 IIV4 VACC NO PRSV 0.5 ML IM: CPT | Performed by: FAMILY MEDICINE

## 2022-09-27 NOTE — PROGRESS NOTES
HPI  Malina Shelter 46 y.o. male  presents to the office today for follow up on ADD. Blood pressure 120/60, pulse 67, temperature 97.7 °F (36.5 °C), temperature source Temporal, resp. rate 16, height 6' 3\" (1.905 m), weight 196 lb (88.9 kg), SpO2 99 %. Body mass index is 24.5 kg/m². Chief Complaint   Patient presents with    Cholesterol Problem    Thyroid Problem    Attention Deficit Disorder      Attention Deficit Disorder (ADD): Pt has a MHx of ADD and is currently on Vyvanse 30mg BID. Pt reports he ran out of the medication about one week ago. Health Maintenance:   Pt due for flu vaccine-- administered today. Current Outpatient Medications   Medication Sig Dispense Refill    [START ON 11/27/2022] lisdexamfetamine (Vyvanse) 30 mg capsule Take 1 Capsule by mouth two (2) times a day. Max Daily Amount: 60 mg. 60 Capsule 0    [START ON 10/27/2022] lisdexamfetamine (VYVANSE) 30 mg capsule Take 1 Capsule by mouth two (2) times a day. Max Daily Amount: 60 mg. 60 Capsule 0    lisdexamfetamine (VYVANSE) 30 mg capsule Take 1 Capsule by mouth two (2) times a day. Max Daily Amount: 60 mg. 60 Capsule 0    atorvastatin (LIPITOR) 10 mg tablet TAKE 1 TABLET BY MOUTH EVERY DAY 90 Tablet 0    multivit-mins no.63/iron/folic (M-VIT PO) Take  by mouth.      levothyroxine (UNITHROID) 137 mcg tablet Take 137 mcg by mouth Daily (before breakfast). 90 Tab 1    cholecalciferol (VITAMIN D3) (2,000 UNITS /50 MCG) cap capsule Take  by mouth daily.        Allergies   Allergen Reactions    Soy Anaphylaxis     Past Medical History:   Diagnosis Date    Hypercholesterolemia     Lipoma of right upper extremity 7/27/2022    Thyroid disease      Past Surgical History:   Procedure Laterality Date    HX VASECTOMY       Family History   Problem Relation Age of Onset    High Cholesterol Father     Migraines Sister     Thyroid Disease Sister      Social History     Tobacco Use    Smoking status: Never    Smokeless tobacco: Never   Substance Use Topics    Alcohol use: Yes        Review of Systems   Constitutional:  Negative for chills and fever. HENT:  Negative for hearing loss and tinnitus. Eyes:  Negative for blurred vision and double vision. Respiratory:  Negative for cough and shortness of breath. Cardiovascular:  Negative for chest pain and palpitations. Gastrointestinal:  Negative for nausea and vomiting. Genitourinary:  Negative for dysuria and frequency. Musculoskeletal:  Negative for back pain and falls. Skin:  Negative for itching and rash. Neurological:  Negative for dizziness, loss of consciousness and headaches. Endo/Heme/Allergies: Negative. Psychiatric/Behavioral:  Negative for depression. The patient is not nervous/anxious. Physical Exam  Vitals reviewed. Constitutional:       Appearance: Normal appearance. HENT:      Head: Normocephalic and atraumatic. Right Ear: Tympanic membrane, ear canal and external ear normal.      Left Ear: Tympanic membrane, ear canal and external ear normal.      Nose: Nose normal.      Mouth/Throat:      Mouth: Mucous membranes are moist.      Pharynx: Oropharynx is clear. Eyes:      Extraocular Movements: Extraocular movements intact. Conjunctiva/sclera: Conjunctivae normal.      Pupils: Pupils are equal, round, and reactive to light. Cardiovascular:      Rate and Rhythm: Normal rate and regular rhythm. Pulses: Normal pulses. Heart sounds: Normal heart sounds. Pulmonary:      Effort: Pulmonary effort is normal.      Breath sounds: Normal breath sounds. Abdominal:      General: Abdomen is flat. Bowel sounds are normal.      Palpations: Abdomen is soft. Musculoskeletal:         General: Normal range of motion. Cervical back: Normal range of motion and neck supple. Skin:     General: Skin is warm and dry. Neurological:      General: No focal deficit present. Mental Status: He is alert and oriented to person, place, and time. Psychiatric:         Mood and Affect: Mood normal.         Behavior: Behavior normal.         Judgment: Judgment normal.       ASSESSMENT and PLAN  Diagnoses and all orders for this visit:    1. Attention deficit disorder (ADD) without hyperactivity  Pt continues on Vyvanse 30mg BID. Pt requests a refill of their medication, which I have granted. The Prescription Monitoring Program has been reviewed for recent activity regarding controlled substances for this patient. -     lisdexamfetamine (Vyvanse) 30 mg capsule; Take 1 Capsule by mouth two (2) times a day. Max Daily Amount: 60 mg.  -     lisdexamfetamine (VYVANSE) 30 mg capsule; Take 1 Capsule by mouth two (2) times a day. Max Daily Amount: 60 mg.  -     lisdexamfetamine (VYVANSE) 30 mg capsule; Take 1 Capsule by mouth two (2) times a day. Max Daily Amount: 60 mg.    2. Medication monitoring encounter  Pt will have urine drug screen in one week. -     DRUG PROFILE, UR, 9 DRUGS; Future    Medication risks/benefits/costs/interactions/alternatives discussed with patient. Advised patient to call back or return to office if symptoms worsen/change/persist.  If patient cannot reach us or should anything more severe/urgent arise he/she should proceed directly to the nearest emergency department. Discussed expected course/resolution/complications of diagnosis in detail with patient. Patient given a written after visit summary which includes diagnoses, current medications and vitals. Patient expressed understanding with the diagnosis and plan. Written by meek Merrill, as dictated by Isabel Dimas M.D.    10:24 AM - 10:41 AM    Total time spent with the patient 15 minutes, greater than 50% of time spent counseling patient.

## 2022-09-27 NOTE — PROGRESS NOTES
Chief Complaint   Patient presents with    Cholesterol Problem    Thyroid Problem    Attention Deficit Disorder     1. \"Have you been to the ER, urgent care clinic since your last visit? Hospitalized since your last visit? \" no    2. \"Have you seen or consulted any other health care providers outside of the [address] [place] [address] since your last visit? \"  no    3. For patients aged 39-70: Has the patient had a colonoscopy / FIT/ Cologuard?  No gap

## 2022-12-11 DIAGNOSIS — E78.2 MIXED HYPERLIPIDEMIA: ICD-10-CM

## 2022-12-14 RX ORDER — ATORVASTATIN CALCIUM 10 MG/1
TABLET, FILM COATED ORAL
Qty: 90 TABLET | Refills: 0 | Status: SHIPPED | OUTPATIENT
Start: 2022-12-14

## 2022-12-20 ENCOUNTER — OFFICE VISIT (OUTPATIENT)
Dept: FAMILY MEDICINE CLINIC | Age: 52
End: 2022-12-20
Payer: COMMERCIAL

## 2022-12-20 VITALS
WEIGHT: 197 LBS | DIASTOLIC BLOOD PRESSURE: 78 MMHG | SYSTOLIC BLOOD PRESSURE: 118 MMHG | HEIGHT: 75 IN | BODY MASS INDEX: 24.49 KG/M2

## 2022-12-20 DIAGNOSIS — F98.8 ATTENTION DEFICIT DISORDER (ADD) WITHOUT HYPERACTIVITY: Primary | ICD-10-CM

## 2022-12-20 DIAGNOSIS — E61.2 MAGNESIUM DEFICIENCY: ICD-10-CM

## 2022-12-20 PROCEDURE — 99214 OFFICE O/P EST MOD 30 MIN: CPT | Performed by: FAMILY MEDICINE

## 2022-12-20 NOTE — PROGRESS NOTES
Chief Complaint   Patient presents with    Attention Deficit Disorder     1. \"Have you been to the ER, urgent care clinic since your last visit? Hospitalized since your last visit?\"     2. \"Have you seen or consulted any other health care providers outside of the [address] [place] [address] since your last visit? \"

## 2022-12-20 NOTE — PROGRESS NOTES
KIKE Sosa 46 y.o. male  presents to the office today for follow up on ADD. Height 6' 3\" (1.905 m), weight 197 lb (89.4 kg). Body mass index is 24.62 kg/m². Chief Complaint   Patient presents with    Attention Deficit Disorder      Attention Deficit Disorder: Pt has a MHx of ADD and is currently on Vyvanse 30mg BID. He is requesting a refill. Pt reports some hyper focus/rumination on negative thoughts. He reports occasional palpitations at night, but this is normal for him. He denies CP, SOB, or dizziness. He started taking magnesium 250mg daily about one month ago. Current Outpatient Medications   Medication Sig Dispense Refill    atorvastatin (LIPITOR) 10 mg tablet TAKE 1 TABLET BY MOUTH EVERY DAY 90 Tablet 0    lisdexamfetamine (Vyvanse) 30 mg capsule Take 1 Capsule by mouth two (2) times a day. Max Daily Amount: 60 mg. 60 Capsule 0    lisdexamfetamine (VYVANSE) 30 mg capsule Take 1 Capsule by mouth two (2) times a day. Max Daily Amount: 60 mg. 60 Capsule 0    lisdexamfetamine (VYVANSE) 30 mg capsule Take 1 Capsule by mouth two (2) times a day. Max Daily Amount: 60 mg. 60 Capsule 0    multivit-mins no.63/iron/folic (M-VIT PO) Take  by mouth.      levothyroxine (UNITHROID) 137 mcg tablet Take 137 mcg by mouth Daily (before breakfast). 90 Tab 1    cholecalciferol (VITAMIN D3) (2,000 UNITS /50 MCG) cap capsule Take  by mouth daily.        Allergies   Allergen Reactions    Soy Anaphylaxis     Past Medical History:   Diagnosis Date    Hypercholesterolemia     Lipoma of right upper extremity 7/27/2022    Thyroid disease      Past Surgical History:   Procedure Laterality Date    HX VASECTOMY       Family History   Problem Relation Age of Onset    High Cholesterol Father     Migraines Sister     Thyroid Disease Sister      Social History     Tobacco Use    Smoking status: Never    Smokeless tobacco: Never   Substance Use Topics    Alcohol use: Yes        Review of Systems   Constitutional: Negative for chills and fever. HENT:  Negative for hearing loss and tinnitus. Eyes:  Negative for blurred vision and double vision. Respiratory:  Negative for cough and shortness of breath. Cardiovascular:  Negative for chest pain and palpitations. Gastrointestinal:  Negative for nausea and vomiting. Genitourinary:  Negative for dysuria and frequency. Musculoskeletal:  Negative for back pain and falls. Skin:  Negative for itching and rash. Neurological:  Negative for dizziness, loss of consciousness and headaches. Endo/Heme/Allergies: Negative. Psychiatric/Behavioral:  Negative for depression. The patient is not nervous/anxious. Physical Exam  Vitals reviewed. Constitutional:       Appearance: Normal appearance. HENT:      Head: Normocephalic and atraumatic. Right Ear: Tympanic membrane, ear canal and external ear normal.      Left Ear: Tympanic membrane, ear canal and external ear normal.      Nose: Nose normal.      Mouth/Throat:      Mouth: Mucous membranes are moist.      Pharynx: Oropharynx is clear. Eyes:      Extraocular Movements: Extraocular movements intact. Conjunctiva/sclera: Conjunctivae normal.      Pupils: Pupils are equal, round, and reactive to light. Cardiovascular:      Rate and Rhythm: Normal rate and regular rhythm. Pulses: Normal pulses. Heart sounds: Normal heart sounds. Pulmonary:      Effort: Pulmonary effort is normal.      Breath sounds: Normal breath sounds. Abdominal:      General: Abdomen is flat. Bowel sounds are normal.      Palpations: Abdomen is soft. Musculoskeletal:         General: Normal range of motion. Cervical back: Normal range of motion and neck supple. Skin:     General: Skin is warm and dry. Neurological:      General: No focal deficit present. Mental Status: He is alert and oriented to person, place, and time.    Psychiatric:         Mood and Affect: Mood normal.         Behavior: Behavior normal.         Judgment: Judgment normal.         ASSESSMENT and PLAN  Diagnoses and all orders for this visit:    1. Attention deficit disorder (ADD) without hyperactivity  Pt continues with Vyvanse 30mg BID. We discussed that his rumination on negative thoughts may be a combination of his medication and personality. He will let me know if this becomes an issue for him. Pt requests a refill of their medication, which I have granted. The Prescription Monitoring Program has been reviewed for recent activity regarding controlled substances for this patient. -     lisdexamfetamine (VYVANSE) 30 mg capsule; Take 1 Capsule by mouth two (2) times a day. Max Daily Amount: 60 mg.  -     lisdexamfetamine (Vyvanse) 30 mg capsule; Take 1 Capsule by mouth two (2) times a day. Max Daily Amount: 60 mg.  -     lisdexamfetamine (VYVANSE) 30 mg capsule; Take 1 Capsule by mouth two (2) times a day. Max Daily Amount: 60 mg.    2. Magnesium deficiency  He recently started taking a magnesium supplement. Will check magnesium levels today. -     MAGNESIUM; Future      Medication risks/benefits/costs/interactions/alternatives discussed with patient. Advised patient to call back or return to office if symptoms worsen/change/persist.  If patient cannot reach us or should anything more severe/urgent arise he/she should proceed directly to the nearest emergency department. Discussed expected course/resolution/complications of diagnosis in detail with patient. Patient given a written after visit summary which includes diagnoses, current medications and vitals. Patient expressed understanding with the diagnosis and plan. Written by meek Sauer, as dictated by Katya Miles M.D.    11:20 AM - 11:32 AM    Total time spent with the patient 12 minutes, greater than 50% of time spent counseling patient.

## 2022-12-21 LAB — MAGNESIUM SERPL-MCNC: 2.4 MG/DL (ref 1.6–2.4)

## 2023-01-12 ENCOUNTER — ANESTHESIA EVENT (OUTPATIENT)
Dept: SURGERY | Age: 53
End: 2023-01-12
Payer: COMMERCIAL

## 2023-01-13 ENCOUNTER — ANESTHESIA (OUTPATIENT)
Dept: SURGERY | Age: 53
End: 2023-01-13
Payer: COMMERCIAL

## 2023-01-13 ENCOUNTER — HOSPITAL ENCOUNTER (OUTPATIENT)
Age: 53
Setting detail: OUTPATIENT SURGERY
Discharge: HOME | End: 2023-01-13
Attending: SURGERY | Admitting: SURGERY
Payer: COMMERCIAL

## 2023-01-13 VITALS
OXYGEN SATURATION: 100 % | HEIGHT: 75 IN | RESPIRATION RATE: 18 BRPM | HEART RATE: 55 BPM | WEIGHT: 195 LBS | SYSTOLIC BLOOD PRESSURE: 120 MMHG | DIASTOLIC BLOOD PRESSURE: 75 MMHG | BODY MASS INDEX: 24.25 KG/M2 | TEMPERATURE: 97.8 F

## 2023-01-13 DIAGNOSIS — D17.21 LIPOMA OF RIGHT UPPER EXTREMITY: Primary | ICD-10-CM

## 2023-01-13 PROCEDURE — 77030042556 HC PNCL CAUT -B: Performed by: SURGERY

## 2023-01-13 PROCEDURE — 74011250636 HC RX REV CODE- 250/636: Performed by: STUDENT IN AN ORGANIZED HEALTH CARE EDUCATION/TRAINING PROGRAM

## 2023-01-13 PROCEDURE — 76210000020 HC REC RM PH II FIRST 0.5 HR: Performed by: SURGERY

## 2023-01-13 PROCEDURE — 77030031139 HC SUT VCRL2 J&J -A: Performed by: SURGERY

## 2023-01-13 PROCEDURE — 74011000250 HC RX REV CODE- 250: Performed by: NURSE ANESTHETIST, CERTIFIED REGISTERED

## 2023-01-13 PROCEDURE — 74011250637 HC RX REV CODE- 250/637: Performed by: STUDENT IN AN ORGANIZED HEALTH CARE EDUCATION/TRAINING PROGRAM

## 2023-01-13 PROCEDURE — 76210000063 HC OR PH I REC FIRST 0.5 HR: Performed by: SURGERY

## 2023-01-13 PROCEDURE — 76060000034 HC ANESTHESIA 1.5 TO 2 HR: Performed by: SURGERY

## 2023-01-13 PROCEDURE — 77030002933 HC SUT MCRYL J&J -A: Performed by: SURGERY

## 2023-01-13 PROCEDURE — 76010000149 HC OR TIME 1 TO 1.5 HR: Performed by: SURGERY

## 2023-01-13 PROCEDURE — 74011000250 HC RX REV CODE- 250: Performed by: SURGERY

## 2023-01-13 PROCEDURE — 2709999900 HC NON-CHARGEABLE SUPPLY: Performed by: SURGERY

## 2023-01-13 PROCEDURE — 74011250636 HC RX REV CODE- 250/636: Performed by: NURSE ANESTHETIST, CERTIFIED REGISTERED

## 2023-01-13 PROCEDURE — 77030010507 HC ADH SKN DERMBND J&J -B: Performed by: SURGERY

## 2023-01-13 PROCEDURE — 88304 TISSUE EXAM BY PATHOLOGIST: CPT

## 2023-01-13 RX ORDER — SODIUM CHLORIDE 0.9 % (FLUSH) 0.9 %
5-40 SYRINGE (ML) INJECTION EVERY 8 HOURS
Status: DISCONTINUED | OUTPATIENT
Start: 2023-01-13 | End: 2023-01-13 | Stop reason: HOSPADM

## 2023-01-13 RX ORDER — OXYCODONE HYDROCHLORIDE 5 MG/1
5 TABLET ORAL
Qty: 3 TABLET | Refills: 0 | Status: SHIPPED | OUTPATIENT
Start: 2023-01-13 | End: 2023-01-16 | Stop reason: ALTCHOICE

## 2023-01-13 RX ORDER — SODIUM CHLORIDE 0.9 % (FLUSH) 0.9 %
5-40 SYRINGE (ML) INJECTION AS NEEDED
Status: DISCONTINUED | OUTPATIENT
Start: 2023-01-13 | End: 2023-01-13 | Stop reason: HOSPADM

## 2023-01-13 RX ORDER — FENTANYL CITRATE 50 UG/ML
INJECTION, SOLUTION INTRAMUSCULAR; INTRAVENOUS AS NEEDED
Status: DISCONTINUED | OUTPATIENT
Start: 2023-01-13 | End: 2023-01-13 | Stop reason: HOSPADM

## 2023-01-13 RX ORDER — PROPOFOL 10 MG/ML
INJECTION, EMULSION INTRAVENOUS AS NEEDED
Status: DISCONTINUED | OUTPATIENT
Start: 2023-01-13 | End: 2023-01-13 | Stop reason: HOSPADM

## 2023-01-13 RX ORDER — ONDANSETRON 2 MG/ML
4 INJECTION INTRAMUSCULAR; INTRAVENOUS AS NEEDED
Status: DISCONTINUED | OUTPATIENT
Start: 2023-01-13 | End: 2023-01-13 | Stop reason: HOSPADM

## 2023-01-13 RX ORDER — SODIUM CHLORIDE, SODIUM LACTATE, POTASSIUM CHLORIDE, CALCIUM CHLORIDE 600; 310; 30; 20 MG/100ML; MG/100ML; MG/100ML; MG/100ML
1000 INJECTION, SOLUTION INTRAVENOUS CONTINUOUS
Status: DISCONTINUED | OUTPATIENT
Start: 2023-01-13 | End: 2023-01-13 | Stop reason: HOSPADM

## 2023-01-13 RX ORDER — PROPOFOL 10 MG/ML
INJECTION, EMULSION INTRAVENOUS
Status: DISCONTINUED | OUTPATIENT
Start: 2023-01-13 | End: 2023-01-13 | Stop reason: HOSPADM

## 2023-01-13 RX ORDER — MIDAZOLAM HYDROCHLORIDE 1 MG/ML
INJECTION, SOLUTION INTRAMUSCULAR; INTRAVENOUS AS NEEDED
Status: DISCONTINUED | OUTPATIENT
Start: 2023-01-13 | End: 2023-01-13 | Stop reason: HOSPADM

## 2023-01-13 RX ORDER — HYDROMORPHONE HYDROCHLORIDE 1 MG/ML
0.2 INJECTION, SOLUTION INTRAMUSCULAR; INTRAVENOUS; SUBCUTANEOUS
Status: DISCONTINUED | OUTPATIENT
Start: 2023-01-13 | End: 2023-01-13 | Stop reason: HOSPADM

## 2023-01-13 RX ORDER — ACETAMINOPHEN 325 MG/1
650 TABLET ORAL ONCE
Status: COMPLETED | OUTPATIENT
Start: 2023-01-13 | End: 2023-01-13

## 2023-01-13 RX ORDER — ROPIVACAINE HYDROCHLORIDE 5 MG/ML
30 INJECTION, SOLUTION EPIDURAL; INFILTRATION; PERINEURAL AS NEEDED
Status: DISCONTINUED | OUTPATIENT
Start: 2023-01-13 | End: 2023-01-13 | Stop reason: HOSPADM

## 2023-01-13 RX ORDER — MORPHINE SULFATE 2 MG/ML
2 INJECTION, SOLUTION INTRAMUSCULAR; INTRAVENOUS
Status: DISCONTINUED | OUTPATIENT
Start: 2023-01-13 | End: 2023-01-13 | Stop reason: HOSPADM

## 2023-01-13 RX ORDER — FENTANYL CITRATE 50 UG/ML
50 INJECTION, SOLUTION INTRAMUSCULAR; INTRAVENOUS AS NEEDED
Status: DISCONTINUED | OUTPATIENT
Start: 2023-01-13 | End: 2023-01-13 | Stop reason: HOSPADM

## 2023-01-13 RX ORDER — BUPIVACAINE HYDROCHLORIDE 2.5 MG/ML
INJECTION, SOLUTION EPIDURAL; INFILTRATION; INTRACAUDAL AS NEEDED
Status: DISCONTINUED | OUTPATIENT
Start: 2023-01-13 | End: 2023-01-13 | Stop reason: HOSPADM

## 2023-01-13 RX ORDER — LIDOCAINE HYDROCHLORIDE 10 MG/ML
0.1 INJECTION, SOLUTION EPIDURAL; INFILTRATION; INTRACAUDAL; PERINEURAL AS NEEDED
Status: DISCONTINUED | OUTPATIENT
Start: 2023-01-13 | End: 2023-01-13 | Stop reason: HOSPADM

## 2023-01-13 RX ORDER — OXYCODONE HYDROCHLORIDE 5 MG/1
5 TABLET ORAL AS NEEDED
Status: DISCONTINUED | OUTPATIENT
Start: 2023-01-13 | End: 2023-01-13 | Stop reason: HOSPADM

## 2023-01-13 RX ORDER — MIDAZOLAM HYDROCHLORIDE 1 MG/ML
1 INJECTION, SOLUTION INTRAMUSCULAR; INTRAVENOUS AS NEEDED
Status: DISCONTINUED | OUTPATIENT
Start: 2023-01-13 | End: 2023-01-13 | Stop reason: HOSPADM

## 2023-01-13 RX ORDER — LIDOCAINE HYDROCHLORIDE 20 MG/ML
INJECTION, SOLUTION EPIDURAL; INFILTRATION; INTRACAUDAL; PERINEURAL AS NEEDED
Status: DISCONTINUED | OUTPATIENT
Start: 2023-01-13 | End: 2023-01-13 | Stop reason: HOSPADM

## 2023-01-13 RX ORDER — PHENYLEPHRINE HCL IN 0.9% NACL 0.4MG/10ML
SYRINGE (ML) INTRAVENOUS AS NEEDED
Status: DISCONTINUED | OUTPATIENT
Start: 2023-01-13 | End: 2023-01-13 | Stop reason: HOSPADM

## 2023-01-13 RX ORDER — ACETAMINOPHEN 500 MG
1000 TABLET ORAL
Qty: 20 TABLET | Refills: 2 | Status: SHIPPED | OUTPATIENT
Start: 2023-01-13 | End: 2023-01-16

## 2023-01-13 RX ORDER — FENTANYL CITRATE 50 UG/ML
25 INJECTION, SOLUTION INTRAMUSCULAR; INTRAVENOUS
Status: DISCONTINUED | OUTPATIENT
Start: 2023-01-13 | End: 2023-01-13 | Stop reason: HOSPADM

## 2023-01-13 RX ADMIN — SODIUM CHLORIDE, POTASSIUM CHLORIDE, SODIUM LACTATE AND CALCIUM CHLORIDE: 600; 310; 30; 20 INJECTION, SOLUTION INTRAVENOUS at 08:37

## 2023-01-13 RX ADMIN — SODIUM CHLORIDE, POTASSIUM CHLORIDE, SODIUM LACTATE AND CALCIUM CHLORIDE: 600; 310; 30; 20 INJECTION, SOLUTION INTRAVENOUS at 06:55

## 2023-01-13 RX ADMIN — Medication 40 MCG: at 08:37

## 2023-01-13 RX ADMIN — MIDAZOLAM 2 MG: 1 INJECTION INTRAMUSCULAR; INTRAVENOUS at 07:26

## 2023-01-13 RX ADMIN — ACETAMINOPHEN 650 MG: 325 TABLET ORAL at 06:29

## 2023-01-13 RX ADMIN — PROPOFOL 30 MG: 10 INJECTION, EMULSION INTRAVENOUS at 07:37

## 2023-01-13 RX ADMIN — Medication 80 MCG: at 08:33

## 2023-01-13 RX ADMIN — PROPOFOL 100 MCG/KG/MIN: 10 INJECTION, EMULSION INTRAVENOUS at 07:37

## 2023-01-13 RX ADMIN — FENTANYL CITRATE 25 MCG: 50 INJECTION, SOLUTION INTRAMUSCULAR; INTRAVENOUS at 07:53

## 2023-01-13 RX ADMIN — Medication 40 MCG: at 08:42

## 2023-01-13 RX ADMIN — LIDOCAINE HYDROCHLORIDE 30 MG: 20 INJECTION, SOLUTION EPIDURAL; INFILTRATION; INTRACAUDAL; PERINEURAL at 07:37

## 2023-01-13 RX ADMIN — FENTANYL CITRATE 25 MCG: 50 INJECTION, SOLUTION INTRAMUSCULAR; INTRAVENOUS at 07:59

## 2023-01-13 NOTE — DISCHARGE INSTRUCTIONS
Patient Discharge Instructions    Sun Postal / [id number] : 1970    Admitted 2023 Discharged: 2023       It is important that you take the medication exactly as they are prescribed. Keep your medication in the bottles provided by the pharmacist and keep a list of the medication names, dosages, and times to be taken in your wallet. Do not take other medications without consulting your doctor. What to do at Home    Recommended diet: Regular. Recommended activity: No Restrictions. No Driving While Taking Oxycodone. Tylenol 1000 mg every 6 hours as needed for pain. Ice pack to surgical sites as needed. Oxycodone as needed for severe pain. May Take Shower or Hawley Roxo. If you experience any of the following symptoms Fevers, Chills, Nausea, Vomitting, Redness or Drainage at Surgical Site(s) or Any Other Questions or Concerns Please Call -  [phone]. Follow-up with Dr. Chhaya Hernadez in 10-14 days. Information obtained by :  I understand that if any problems occur once I am at home I am to contact my physician. I understand and acknowledge receipt of the instructions indicated above.                                                                                                                                            Physician's or R.N.'s Signature                                                                  Date/Time                                                                                                                                              Patient or Representative Signature                                                          Date/Time    ______________________________________________________________________    Anesthesia Discharge Instructions    After general anesthesia or intervenous sedation, for 24 hours or while taking prescription Narcotics:  Limit your activities  Do not drive or operate hazardous machinery  If you have not urinated within 8 hours after discharge, please contact your surgeon on call. Do not make important personal or business decisions  Do not drink alcoholic beverages    Report the following to your surgeon:  Excessive pain, swelling, redness or odor of or around the surgical area  Temperature over 100.5 degrees  Nausea and vomiting lasting longer than 4 hours or if unable to take medication  Any signs of decreased circulation or nerve impairment to extremity:  Change in color, persistent numbness, tingling, coldness or increased pain.   Any questions

## 2023-01-13 NOTE — ANESTHESIA POSTPROCEDURE EVALUATION
Procedure(s):  EXCISE MULTIPLE LIPOMAS RIGHT UPPER ARM. MAC, general - backup    Anesthesia Post Evaluation      Multimodal analgesia: multimodal analgesia used between 6 hours prior to anesthesia start to PACU discharge  Patient location during evaluation: PACU  Level of consciousness: awake  Pain management: adequate  Airway patency: patent  Anesthetic complications: no  Cardiovascular status: acceptable  Respiratory status: acceptable  Hydration status: acceptable  Post anesthesia nausea and vomiting:  none  Final Post Anesthesia Temperature Assessment:  Normothermia (36.0-37.5 degrees C)      INITIAL Post-op Vital signs:   Vitals Value Taken Time   /75 01/13/23 0915   Temp     Pulse 58 01/13/23 0919   Resp 13 01/13/23 0919   SpO2 100 % 01/13/23 0919   Vitals shown include unvalidated device data.

## 2023-01-13 NOTE — BRIEF OP NOTE
Brief Postoperative Note    Patient: Debo Silverio  YOB: 1970  MRN: [id number]    Date of Procedure: 1/13/2023     Pre-Op Diagnosis:  Multiple Lipomas Right Upper Arm. Post-Op Diagnosis:  Same. Procedure(s):   Excise Multiple Lipomas Right Upper Arm. Surgeon(s):  Daria Oshea MD    Surgical Assistant:  Fatuma Medina SA. Anesthesia: Local with IV Sedation. Estimated Blood Loss (mL): Approximately 5 ml. Complications: None    Specimens:   ID Type Source Tests Collected by Time Destination   1 : Lipomas right arm Tissue   Daria Oshea MD 1/13/2023 7793 Pathology        Implants: * No implants in log *    Drains: * No LDAs found *    Findings: Lipoma posterior right upper arm - approximately 1 cm x 1 cm. Lipoma lateral right upper arm - approximately 3 cm x 2 cm.     Electronically Signed by Heidi Keyes MD on 1/13/2023 at 8:44 AM

## 2023-01-13 NOTE — ANESTHESIA PREPROCEDURE EVALUATION
Relevant Problems   No relevant active problems       Anesthetic History   No history of anesthetic complications            Review of Systems / Medical History  Patient summary reviewed, nursing notes reviewed and pertinent labs reviewed    Pulmonary  Within defined limits                 Neuro/Psych   Within defined limits           Cardiovascular  Within defined limits                Exercise tolerance: >4 METS     GI/Hepatic/Renal     GERD: well controlled           Endo/Other      Hypothyroidism       Other Findings            Physical Exam    Airway  Mallampati: II  TM Distance: 4 - 6 cm  Neck ROM: normal range of motion   Mouth opening: Normal     Cardiovascular    Rhythm: regular  Rate: normal         Dental  No notable dental hx       Pulmonary  Breath sounds clear to auscultation               Abdominal  GI exam deferred       Other Findings            Anesthetic Plan    ASA: 2  Anesthesia type: MAC and general - backup          Induction: Intravenous  Anesthetic plan and risks discussed with: Patient

## 2023-01-13 NOTE — ROUTINE PROCESS
Patient: Paradise Gould MRN: [id number]  SSN: [id number]   YOB: 1970  Age: 46 y.o. Sex: male     Patient is status post Procedure(s):  EXCISE MULTIPLE LIPOMAS RIGHT UPPER ARM.     Surgeon(s) and Role:     Deanna Miles MD - Primary    Local/Dose/Irrigation:  0.25% Marcaine - 30 ml injected                  Peripheral IV 01/13/23 Left Brachial (Active)                           Dressing/Packing:  Incision 01/13/23 Arm Right-Dressing/Treatment: Skin glue (01/13/23 0700)    Splint/Cast:  ]

## 2023-01-13 NOTE — OP NOTES
[address]   OPERATIVE REPORT    Name:  Anirudh Bess  MR#:  [id number]  :  1970  ACCOUNT #:  [de-identified]  DATE OF SERVICE:  2023    PREOPERATIVE DIAGNOSIS:  Multiple lipomas, right upper arm. POSTOPERATIVE DIAGNOSIS:  Multiple lipomas, right upper arm. PROCEDURE PERFORMED:  Excise multiple lipomas, right upper arm. SURGEON:  Alex Hassan MD    ASSISTANT:  Barrington Bridges SA    ANESTHESIA:  Local with sedation. COMPLICATIONS:  None. SPECIMENS REMOVED:  Lipomas of the right upper arm to Pathology. IMPLANTS:  ***. ESTIMATED BLOOD LOSS:  Approximately 5 mL. IV FLUIDS:  Crystalloid 1000 mL. DRAINS:  None. INDICATIONS:  The patient is a 72-year-old man with well-circumscribed freely movable subcutaneous masses on the posterior aspect of his right upper arm and the lateral aspect of his right upper arm. Clinically, they are consistent with lipomas. The patient is brought to the operating room at this time for excision of the lipomas as they are bothersome to him. The risks of the procedure including but not limited to infection, bleeding, the need for further surgery, and recurrence were discussed in detail with the patient. The patient understood and wished to proceed. PROCEDURE:  After consent was obtained, the patient was brought to the operating room where he was placed in the supine position on the operating room table. Following the induction of an adequate level of intravenous sedation, compression devices were placed on both lower extremities. The right arm was extended on an arm board and was prepped with ChloraPrep and draped as a sterile field. Attention was directed first towards the posterior aspect of the right upper arm. Local anesthetic was infiltrated and an incision over the lipoma was opened sharply. Subcutaneous bleeders were carefully cauterized.   The incision was carried down to the lipoma which was readily identified, dissected free circumferentially and excised. The specimen which measured approximately 1 cm x 1 cm was passed off the field and submitted for histopathologic evaluation. The lipoma was subcutaneous. There was no extension beneath the fascia. Several bleeders were cauterized and the wound was irrigated with saline. The wound was inspected and there did not appear to be residual lipoma. The surgical incision was closed with running 3-0 Vicryl suture followed by 4-0 Monocryl subcuticular suture to the skin. Attention was then directed towards the lateral aspect of the right upper arm. Again, local anesthetic was infiltrated and an incision over the lipoma was opened sharply. Subcutaneous bleeders were carefully cauterized. The incision was carried down to the lipoma which was readily identified, dissected free circumferentially and excised. The specimen which measured approximately 3 cm x 2 cm was passed off the field and submitted for histopathologic evaluation. It should be noted that the lipoma was subcutaneous. There was no extension beneath the fascia. Inspection of the wound revealed no residual lipoma. The wound was irrigated with saline, inspected, and found to be hemostatic. The surgical incision was closed with running 3-0 Vicryl suture followed by 4-0 Monocryl subcuticular suture to the skin. Additional local anesthetic was infiltrated at both surgical sites and both incisions were dressed with Dermabond. The patient was transferred from the operating room table to the stretcher and brought to the recovery room in stable condition having tolerated the procedure well. At the conclusion of the procedure, all sponge counts, instrument counts, and needle counts were reported as correct x2.       Marcus Vital MD [id number]  D:  01/13/2023 8:51  T:  01/13/2023 13:39  JOB #:  [id number]  CC:  MD Lesa Fountain MD

## 2023-01-13 NOTE — H&P
Mr. Basilio Up has no c/o today. Afebrile VSS  No intake or output data in the 24 hours ending 01/13/23 0714   Exam: Cor: RRR. Lungs: Bilateral breath sounds. Clear to auscultation. Abd: Soft. Non distended. Non tender. RUE: No change in lipomas. Labs: No results found for this or any previous visit (from the past 12 hour(s)). To OR for excision of lipomas. Discussed procedure with him including risks of bleeding, infection, need for further surgery, recurrence. Mr. Basilio Up understands and wishes to proceed. Consent on chart. Surgical sites marked. NPO for now.

## 2023-01-16 ENCOUNTER — OFFICE VISIT (OUTPATIENT)
Dept: FAMILY MEDICINE CLINIC | Age: 53
End: 2023-01-16
Payer: COMMERCIAL

## 2023-01-16 VITALS
BODY MASS INDEX: 24.37 KG/M2 | DIASTOLIC BLOOD PRESSURE: 62 MMHG | HEIGHT: 75 IN | TEMPERATURE: 97.5 F | HEART RATE: 68 BPM | OXYGEN SATURATION: 98 % | SYSTOLIC BLOOD PRESSURE: 100 MMHG | RESPIRATION RATE: 16 BRPM | WEIGHT: 196 LBS

## 2023-01-16 DIAGNOSIS — Z00.00 PHYSICAL EXAM: Primary | ICD-10-CM

## 2023-01-16 NOTE — PROGRESS NOTES
Chief Complaint   Patient presents with    Physical     1. \"Have you been to the ER, urgent care clinic since your last visit? Hospitalized since your last visit? \" Cyst removal at hospital     2. \"Have you seen or consulted any other health care providers outside of the [address] [place] [address] since your last visit? \"  Dr. Rahman Pump surgeon for cyst removal

## 2023-01-16 NOTE — PROGRESS NOTES
HPI  Hannah Hooker 46 y.o. male  presents to the office today for CPE. Blood pressure 100/62, pulse 68, temperature 97.5 °F (36.4 °C), temperature source Temporal, resp. rate 16, height 6' 3\" (1.905 m), weight 196 lb (88.9 kg), SpO2 98 %. Body mass index is 24.5 kg/m². Chief Complaint   Patient presents with    Physical     Lipid panel on 6/27/22 notable for total cholesterol 187, HDL 61, .6, and triglycerides 77. Pt continues with atorvastatin 10mg nightly. He was off his medication for a few days for his surgery. Pt had two cysts removed on his right arm with Dr. Mini Mcclellan on 1/13/22 (awaiting pathology results). Pt reports the chigger bites that were on his ankles have left scars. We discussed that time will help heal these scars and he is happy with that and not interested in cream to help lighten the scars at this time. Health Maintenance:   Pt had first shingles vaccine and COVID booster on 12/30/22. Current Outpatient Medications   Medication Sig Dispense Refill    [START ON 2/20/2023] lisdexamfetamine (VYVANSE) 30 mg capsule Take 1 Capsule by mouth two (2) times a day. Max Daily Amount: 60 mg. 60 Capsule 0    atorvastatin (LIPITOR) 10 mg tablet TAKE 1 TABLET BY MOUTH EVERY DAY (Patient taking differently: nightly. TAKE 1 TABLET BY MOUTH EVERY DAY) 90 Tablet 0    multivit-mins no.63/iron/folic (M-VIT PO) Take  by mouth.      levothyroxine (UNITHROID) 137 mcg tablet Take 137 mcg by mouth Daily (before breakfast). 90 Tab 1    cholecalciferol (VITAMIN D3) (2,000 UNITS /50 MCG) cap capsule Take  by mouth daily.        Allergies   Allergen Reactions    Soy Anaphylaxis     Past Medical History:   Diagnosis Date    Hypercholesterolemia     Lipoma of right upper extremity 07/27/2022    Thyroid disease      Past Surgical History:   Procedure Laterality Date    HX GI      COLONOSCOPY    HX VASECTOMY       Family History   Problem Relation Age of Onset    SKIN CANCER Mother     High Cholesterol Father     Heart Attack Father     Migraines Sister     Thyroid Disease Sister      Social History     Tobacco Use    Smoking status: Never    Smokeless tobacco: Never   Substance Use Topics    Alcohol use: Yes     Alcohol/week: 4.0 standard drinks     Types: 4 Glasses of wine per week        Review of Systems   Constitutional:  Negative for chills and fever. HENT:  Negative for hearing loss and tinnitus. Eyes:  Negative for blurred vision and double vision. Respiratory:  Negative for cough and shortness of breath. Cardiovascular:  Negative for chest pain and palpitations. Gastrointestinal:  Negative for nausea and vomiting. Genitourinary:  Negative for dysuria and frequency. Musculoskeletal:  Negative for back pain and falls. Skin:  Negative for itching and rash. Neurological:  Negative for dizziness, loss of consciousness and headaches. Endo/Heme/Allergies: Negative. Psychiatric/Behavioral:  Negative for depression. The patient is not nervous/anxious. Physical Exam  Vitals reviewed. Constitutional:       Appearance: Normal appearance. HENT:      Head: Normocephalic and atraumatic. Right Ear: Tympanic membrane, ear canal and external ear normal.      Left Ear: Tympanic membrane, ear canal and external ear normal.      Nose: Nose normal.      Mouth/Throat:      Mouth: Mucous membranes are moist.      Pharynx: Oropharynx is clear. Eyes:      Extraocular Movements: Extraocular movements intact. Conjunctiva/sclera: Conjunctivae normal.      Pupils: Pupils are equal, round, and reactive to light. Cardiovascular:      Rate and Rhythm: Normal rate and regular rhythm. Pulses: Normal pulses. Heart sounds: Normal heart sounds. Pulmonary:      Effort: Pulmonary effort is normal.      Breath sounds: Normal breath sounds. Abdominal:      General: Abdomen is flat. Bowel sounds are normal.      Palpations: Abdomen is soft.    Genitourinary:     Comments: Prostate was enlarged, smooth, symmetrical with no nodules; guaiac negative. Musculoskeletal:         General: Normal range of motion. Cervical back: Normal range of motion and neck supple. Skin:     General: Skin is warm and dry. Neurological:      General: No focal deficit present. Mental Status: He is alert and oriented to person, place, and time. Psychiatric:         Mood and Affect: Mood normal.         Behavior: Behavior normal.         Judgment: Judgment normal.         ASSESSMENT and PLAN  Diagnoses and all orders for this visit:    1. Physical exam  Pt presents today for a CPE, which I performed. All findings were normal. Pt will have updated lab work performed during today's [address]; Future  -     CBC WITH AUTOMATED DIFF; Future  -     LIPID PANEL; Future  -     TSH 3RD GENERATION; Future  -     T4, FREE; Future  -     URINALYSIS W/MICROSCOPIC; Future  -     PSA W/ REFLX FREE PSA; Future      Follow-up and Dispositions    Return in about 1 year (around 1/16/2024) for physical.          Medication risks/benefits/costs/interactions/alternatives discussed with patient. Advised patient to call back or return to office if symptoms worsen/change/persist.  If patient cannot reach us or should anything more severe/urgent arise he/she should proceed directly to the nearest emergency department. Discussed expected course/resolution/complications of diagnosis in detail with patient. Patient given a written after visit summary which includes diagnoses, current medications and vitals. Patient expressed understanding with the diagnosis and plan. Written by meek Arvizu, as dictated by Sb Alcala M.D.    9:09 AM- 9:34 AM    Total time spent with the patient 25 minutes, greater than 50% of time spent counseling patient.

## 2023-01-17 LAB
ALBUMIN SERPL-MCNC: 4.3 G/DL (ref 3.5–5)
ALBUMIN/GLOB SERPL: 1.7 (ref 1.1–2.2)
ALP SERPL-CCNC: 65 U/L (ref 45–117)
ALT SERPL-CCNC: 27 U/L (ref 12–78)
ANION GAP SERPL CALC-SCNC: 4 MMOL/L (ref 5–15)
APPEARANCE UR: CLEAR
AST SERPL-CCNC: 17 U/L (ref 15–37)
BACTERIA URNS QL MICRO: NEGATIVE /HPF
BASOPHILS # BLD: 0.1 K/UL (ref 0–0.1)
BASOPHILS NFR BLD: 1 % (ref 0–1)
BILIRUB SERPL-MCNC: 0.5 MG/DL (ref 0.2–1)
BILIRUB UR QL: NEGATIVE
BUN SERPL-MCNC: 19 MG/DL (ref 6–20)
BUN/CREAT SERPL: 18 (ref 12–20)
CALCIUM SERPL-MCNC: 9.3 MG/DL (ref 8.5–10.1)
CHLORIDE SERPL-SCNC: 106 MMOL/L (ref 97–108)
CHOLEST SERPL-MCNC: 221 MG/DL
CO2 SERPL-SCNC: 30 MMOL/L (ref 21–32)
COLOR UR: NORMAL
CREAT SERPL-MCNC: 1.04 MG/DL (ref 0.7–1.3)
DIFFERENTIAL METHOD BLD: NORMAL
EOSINOPHIL # BLD: 0.3 K/UL (ref 0–0.4)
EOSINOPHIL NFR BLD: 5 % (ref 0–7)
EPITH CASTS URNS QL MICRO: NORMAL /LPF
ERYTHROCYTE [DISTWIDTH] IN BLOOD BY AUTOMATED COUNT: 12.9 % (ref 11.5–14.5)
GLOBULIN SER CALC-MCNC: 2.6 G/DL (ref 2–4)
GLUCOSE SERPL-MCNC: 93 MG/DL (ref 65–100)
GLUCOSE UR STRIP.AUTO-MCNC: NEGATIVE MG/DL
HCT VFR BLD AUTO: 47 % (ref 36.6–50.3)
HDLC SERPL-MCNC: 56 MG/DL
HDLC SERPL: 3.9 (ref 0–5)
HGB BLD-MCNC: 14.4 G/DL (ref 12.1–17)
HGB UR QL STRIP: NEGATIVE
HYALINE CASTS URNS QL MICRO: NORMAL /LPF (ref 0–5)
IMM GRANULOCYTES # BLD AUTO: 0 K/UL (ref 0–0.04)
IMM GRANULOCYTES NFR BLD AUTO: 0 % (ref 0–0.5)
KETONES UR QL STRIP.AUTO: NEGATIVE MG/DL
LDLC SERPL CALC-MCNC: 149.2 MG/DL (ref 0–100)
LEUKOCYTE ESTERASE UR QL STRIP.AUTO: NEGATIVE
LYMPHOCYTES # BLD: 1.8 K/UL (ref 0.8–3.5)
LYMPHOCYTES NFR BLD: 30 % (ref 12–49)
MCH RBC QN AUTO: 29.5 PG (ref 26–34)
MCHC RBC AUTO-ENTMCNC: 30.6 G/DL (ref 30–36.5)
MCV RBC AUTO: 96.3 FL (ref 80–99)
MONOCYTES # BLD: 0.5 K/UL (ref 0–1)
MONOCYTES NFR BLD: 8 % (ref 5–13)
NEUTS SEG # BLD: 3.2 K/UL (ref 1.8–8)
NEUTS SEG NFR BLD: 56 % (ref 32–75)
NITRITE UR QL STRIP.AUTO: NEGATIVE
NRBC # BLD: 0 K/UL (ref 0–0.01)
NRBC BLD-RTO: 0 PER 100 WBC
PH UR STRIP: 6.5 (ref 5–8)
PLATELET # BLD AUTO: 265 K/UL (ref 150–400)
PMV BLD AUTO: 10.9 FL (ref 8.9–12.9)
POTASSIUM SERPL-SCNC: 4.9 MMOL/L (ref 3.5–5.1)
PROT SERPL-MCNC: 6.9 G/DL (ref 6.4–8.2)
PROT UR STRIP-MCNC: NEGATIVE MG/DL
RBC # BLD AUTO: 4.88 M/UL (ref 4.1–5.7)
RBC #/AREA URNS HPF: NORMAL /HPF (ref 0–5)
SODIUM SERPL-SCNC: 140 MMOL/L (ref 136–145)
SP GR UR REFRACTOMETRY: 1.02 (ref 1–1.03)
T4 FREE SERPL-MCNC: 1.2 NG/DL (ref 0.8–1.5)
TRIGL SERPL-MCNC: 79 MG/DL (ref ?–150)
TSH SERPL DL<=0.05 MIU/L-ACNC: 1.01 UIU/ML (ref 0.36–3.74)
UROBILINOGEN UR QL STRIP.AUTO: 0.2 EU/DL (ref 0.2–1)
VLDLC SERPL CALC-MCNC: 15.8 MG/DL
WBC # BLD AUTO: 5.9 K/UL (ref 4.1–11.1)
WBC URNS QL MICRO: NORMAL /HPF (ref 0–4)

## 2023-01-18 LAB
PSA SERPL-MCNC: 3 NG/ML (ref 0–4)
REFLEX CRITERIA: NORMAL

## 2023-01-27 ENCOUNTER — OFFICE VISIT (OUTPATIENT)
Dept: SURGERY | Age: 53
End: 2023-01-27
Payer: COMMERCIAL

## 2023-01-27 VITALS
TEMPERATURE: 98.3 F | BODY MASS INDEX: 24.42 KG/M2 | WEIGHT: 196.4 LBS | SYSTOLIC BLOOD PRESSURE: 124 MMHG | RESPIRATION RATE: 18 BRPM | HEIGHT: 75 IN | HEART RATE: 76 BPM | DIASTOLIC BLOOD PRESSURE: 84 MMHG | OXYGEN SATURATION: 98 %

## 2023-01-27 DIAGNOSIS — D17.21 LIPOMA OF RIGHT UPPER EXTREMITY: ICD-10-CM

## 2023-01-27 DIAGNOSIS — Z09 SURGICAL FOLLOW-UP CARE: Primary | ICD-10-CM

## 2023-01-27 PROCEDURE — 99024 POSTOP FOLLOW-UP VISIT: CPT | Performed by: NURSE PRACTITIONER

## 2023-01-27 NOTE — PROGRESS NOTES
Chief Complaint   Patient presents with    Post OP Follow Up     1/13/23 Lipomas of the right upper arm to Pathology     Simone Elizabeth 2-week status post excision of multiple lipomas right upper arm. He is doing well. Minimal pain. No drainage from the incisions. No fever, chills, chest pain or shortness of breath. Is getting back to his normal activity  No GI complaints    Visit Vitals  /84 (BP 1 Location: Left arm, BP Patient Position: Sitting, BP Cuff Size: Adult)   Pulse 76   Temp 98.3 °F (36.8 °C) (Oral)   Resp 18   Ht 6' 3\" (1.905 m)   Wt 196 lb 6.4 oz (89.1 kg)   SpO2 98%   BMI 24.55 kg/m²     Appears well and healthy  Breathing is unlabored  Right upper arm incision is clean, dry and intact with mild swelling but no erythema or induration, incision the posterior forearm appears well-healed and no erythema or induration        ICD-10-CM ICD-9-CM    1. Surgical follow-up care  Z09 V67.00       2.  Lipoma of right upper extremity  D17.21 214.8         2-week status post excision of lipomas right upper arm doing well  Pathology was reviewed with patient and consistent with lipoma  Activity as tolerated without restriction  May bathe and shower as normal  Okay to use moisturizer  Doing well and discharge from surgical care with as needed follow-up

## 2023-01-27 NOTE — PROGRESS NOTES
Identified pt with two pt identifiers (name and ). Reviewed chart in preparation for visit and have obtained necessary documentation. Mario Jaquez is a 46 y.o. male  Chief Complaint   Patient presents with    Post OP Follow Up     23 Lipomas of the right upper arm to Pathology     Visit Vitals  /84 (BP 1 Location: Left arm, BP Patient Position: Sitting, BP Cuff Size: Adult)   Pulse 76   Temp 98.3 °F (36.8 °C) (Oral)   Resp 18   Ht 6' 3\" (1.905 m)   Wt 196 lb 6.4 oz (89.1 kg)   SpO2 98%   BMI 24.55 kg/m²       1. Have you been to the ER, urgent care clinic since your last visit? Hospitalized since your last visit? No    2. Have you seen or consulted any other health care providers outside of the [address] [place] [address] since your last visit? Include any pap smears or colon screening.  No

## 2023-03-20 ENCOUNTER — VIRTUAL VISIT (OUTPATIENT)
Dept: FAMILY MEDICINE CLINIC | Age: 53
End: 2023-03-20
Payer: COMMERCIAL

## 2023-03-20 DIAGNOSIS — F98.8 ATTENTION DEFICIT DISORDER (ADD) WITHOUT HYPERACTIVITY: Primary | ICD-10-CM

## 2023-03-20 PROCEDURE — 99212 OFFICE O/P EST SF 10 MIN: CPT | Performed by: FAMILY MEDICINE

## 2023-03-20 NOTE — PROGRESS NOTES
Neftaly Rhodes is a 46 y.o. male who was seen by synchronous (real-time) audio-video technology on 3/20/2023. Consent:  Services were provided through a video synchronous discussion virtually to substitute for in-person appointment. He and/or his healthcare decision maker is aware that this patient-initiated Telehealth encounter is a billable service, with coverage as determined by his insurance carrier. He is aware that he may receive a bill and has provided verbal consent to proceed: Yes    I was in the office while conducting this encounter. Subjective:   Neftaly Rhodes was seen for ADD. Attention Deficit Disorder: Pt has a MHx of ADD and is currently on Vyvanse 30mg BID. He denies headaches, dizziness, CP, or SOB. Prior to Admission medications    Medication Sig Start Date End Date Taking? Authorizing Provider   atorvastatin (LIPITOR) 10 mg tablet TAKE 1 TABLET BY MOUTH EVERY DAY 3/13/23   Roman Garcia MD   lisdexamfetamine (VYVANSE) 30 mg capsule Take 1 Capsule by mouth two (2) times a day. Max Daily Amount: 60 mg. 2/20/23   Roman Garcia MD   multivit-mins no.63/iron/folic (M-VIT PO) Take  by mouth. Provider, Historical   levothyroxine (UNITHROID) 137 mcg tablet Take 137 mcg by mouth Daily (before breakfast). 6/12/19   Roman Garcia MD   cholecalciferol (VITAMIN D3) (2,000 UNITS /50 MCG) cap capsule Take  by mouth daily.     Provider, Historical     Allergies   Allergen Reactions    Soy Anaphylaxis     Past Medical History:   Diagnosis Date    Hypercholesterolemia     Lipoma of right upper extremity 07/27/2022    Thyroid disease      Past Surgical History:   Procedure Laterality Date    HX GI      COLONOSCOPY    HX PREMALIG/BENIGN SKIN LESION EXCISION  01/13/2023    Lipomas of the right upper arm Dr Everardo Franco VASECTOMY       Family History   Problem Relation Age of Onset    SKIN CANCER Mother     High Cholesterol Father     Heart Attack Father     Migraines Sister Thyroid Disease Sister      Social History     Tobacco Use    Smoking status: Never    Smokeless tobacco: Never   Substance Use Topics    Alcohol use: Yes     Alcohol/week: 4.0 standard drinks     Types: 4 Glasses of wine per week        Review of Systems   Constitutional:  Negative for chills and fever. HENT:  Negative for hearing loss and tinnitus. Eyes:  Negative for blurred vision and double vision. Respiratory:  Negative for cough and shortness of breath. Cardiovascular:  Negative for chest pain and palpitations. Gastrointestinal:  Negative for nausea and vomiting. Genitourinary:  Negative for dysuria and frequency. Musculoskeletal:  Negative for back pain and falls. Skin:  Negative for itching and rash. Neurological:  Negative for dizziness, loss of consciousness and headaches. Endo/Heme/Allergies: Negative. Psychiatric/Behavioral:  Negative for depression. The patient is not nervous/anxious. PHYSICAL EXAMINATION:  Vital Signs: (As obtained by patient/caregiver at home)  There were no vitals taken for this visit.      Constitutional: [x] Appears well-developed and well-nourished [x] No apparent distress      [] Abnormal -     Mental status: [x] Alert and awake  [x] Oriented to person/place/time [x] Able to follow commands    [] Abnormal -     Eyes:   EOM    [x]  Normal    [] Abnormal -   Sclera  [x]  Normal    [] Abnormal -          Discharge [x]  None visible   [] Abnormal -     HENT: [x] Normocephalic, atraumatic  [] Abnormal -   [x] Mouth/Throat: Mucous membranes are moist    External Ears [x] Normal  [] Abnormal -    Neck: [x] No visualized mass [] Abnormal -     Pulmonary/Chest: [x] Respiratory effort normal   [x] No visualized signs of difficulty breathing or respiratory distress        [] Abnormal -      Musculoskeletal:   [x] Normal gait with no signs of ataxia         [x] Normal range of motion of neck        [] Abnormal -     Neurological:        [x] No Facial Asymmetry (Cranial nerve 7 motor function) (limited exam due to video visit)          [x] No gaze palsy        [] Abnormal -          Skin:        [x] No significant exanthematous lesions or discoloration noted on facial skin         [] Abnormal -            Psychiatric:       [x] Normal Affect [] Abnormal -        [x] No Hallucinations    Other pertinent observable physical exam findings:-    Assessment & Plan:   Diagnoses and all orders for this visit:    1. Attention deficit disorder (ADD) without hyperactivity  Controlled. Continue current regimen. Pt requests a refill of their medication, which I have granted. The Prescription Monitoring Program has been reviewed for recent activity regarding controlled substances for this patient. -     lisdexamfetamine (VYVANSE) 30 mg capsule; Take 1 Capsule by mouth two (2) times a day. Max Daily Amount: 60 mg.  -     lisdexamfetamine (VYVANSE) 30 mg capsule; Take 1 Capsule by mouth two (2) times a day. Max Daily Amount: 60 mg.  -     lisdexamfetamine (VYVANSE) 30 mg capsule; Take 1 Capsule by mouth two (2) times a day. Max Daily Amount: 60 mg. We discussed the expected course, resolution and complications of the diagnosis(es) in detail. Medication risks, benefits, costs, interactions, and alternatives were discussed as indicated. I advised her to contact the office if her condition worsens, changes or fails to improve as anticipated. She expressed understanding with the diagnosis(es) and plan. Pursuant to the emergency declaration under the [address] and [place] [address] [facility] authority and the [employer] General Act, this Virtual Visit was conducted, with patient's consent, to reduce the patient's risk of exposure to COVID-19 and provide continuity of care for an established patient.     Services were provided through a video synchronous discussion virtually to substitute for in-person clinic visit.     Written by meek Crespo, as dictated by Patricia Roth M.D. Total time spent with the patient 5 minutes, greater than 50% of time spent counseling patient.

## 2023-04-22 ENCOUNTER — HOSPITAL ENCOUNTER (EMERGENCY)
Age: 53
Discharge: HOME | End: 2023-04-23
Attending: EMERGENCY MEDICINE
Payer: COMMERCIAL

## 2023-04-22 ENCOUNTER — APPOINTMENT (OUTPATIENT)
Dept: CT IMAGING | Age: 53
End: 2023-04-22
Attending: STUDENT IN AN ORGANIZED HEALTH CARE EDUCATION/TRAINING PROGRAM
Payer: COMMERCIAL

## 2023-04-22 VITALS
DIASTOLIC BLOOD PRESSURE: 87 MMHG | RESPIRATION RATE: 16 BRPM | HEART RATE: 72 BPM | TEMPERATURE: 98.3 F | SYSTOLIC BLOOD PRESSURE: 138 MMHG | OXYGEN SATURATION: 100 %

## 2023-04-22 DIAGNOSIS — M54.50 ACUTE RIGHT-SIDED LOW BACK PAIN WITHOUT SCIATICA: Primary | ICD-10-CM

## 2023-04-22 LAB
ALBUMIN SERPL-MCNC: 3.9 G/DL (ref 3.5–5)
ALBUMIN/GLOB SERPL: 1.2 (ref 1.1–2.2)
ALP SERPL-CCNC: 54 U/L (ref 45–117)
ALT SERPL-CCNC: 36 U/L (ref 12–78)
ANION GAP SERPL CALC-SCNC: 6 MMOL/L (ref 5–15)
APPEARANCE UR: CLEAR
AST SERPL-CCNC: 16 U/L (ref 15–37)
BACTERIA URNS QL MICRO: NEGATIVE /HPF
BASOPHILS # BLD: 0 K/UL (ref 0–0.1)
BASOPHILS NFR BLD: 0 % (ref 0–1)
BILIRUB SERPL-MCNC: 0.6 MG/DL (ref 0.2–1)
BILIRUB UR QL: NEGATIVE
BUN SERPL-MCNC: 15 MG/DL (ref 6–20)
BUN/CREAT SERPL: 13 (ref 12–20)
CALCIUM SERPL-MCNC: 9 MG/DL (ref 8.5–10.1)
CHLORIDE SERPL-SCNC: 99 MMOL/L (ref 97–108)
CO2 SERPL-SCNC: 27 MMOL/L (ref 21–32)
COLOR UR: ABNORMAL
COMMENT, HOLDF: NORMAL
CREAT SERPL-MCNC: 1.17 MG/DL (ref 0.7–1.3)
DIFFERENTIAL METHOD BLD: ABNORMAL
EOSINOPHIL # BLD: 0 K/UL (ref 0–0.4)
EOSINOPHIL NFR BLD: 0 % (ref 0–7)
EPITH CASTS URNS QL MICRO: ABNORMAL /LPF
ERYTHROCYTE [DISTWIDTH] IN BLOOD BY AUTOMATED COUNT: 12.2 % (ref 11.5–14.5)
GLOBULIN SER CALC-MCNC: 3.2 G/DL (ref 2–4)
GLUCOSE SERPL-MCNC: 147 MG/DL (ref 65–100)
GLUCOSE UR STRIP.AUTO-MCNC: NEGATIVE MG/DL
HCT VFR BLD AUTO: 40.6 % (ref 36.6–50.3)
HGB BLD-MCNC: 13.6 G/DL (ref 12.1–17)
HGB UR QL STRIP: NEGATIVE
HYALINE CASTS URNS QL MICRO: ABNORMAL /LPF (ref 0–5)
IMM GRANULOCYTES # BLD AUTO: 0 K/UL (ref 0–0.04)
IMM GRANULOCYTES NFR BLD AUTO: 0 % (ref 0–0.5)
KETONES UR QL STRIP.AUTO: 15 MG/DL
LEUKOCYTE ESTERASE UR QL STRIP.AUTO: NEGATIVE
LYMPHOCYTES # BLD: 1.1 K/UL (ref 0.8–3.5)
LYMPHOCYTES NFR BLD: 12 % (ref 12–49)
MCH RBC QN AUTO: 30.2 PG (ref 26–34)
MCHC RBC AUTO-ENTMCNC: 33.5 G/DL (ref 30–36.5)
MCV RBC AUTO: 90 FL (ref 80–99)
MONOCYTES # BLD: 0.5 K/UL (ref 0–1)
MONOCYTES NFR BLD: 6 % (ref 5–13)
NEUTS SEG # BLD: 7.3 K/UL (ref 1.8–8)
NEUTS SEG NFR BLD: 82 % (ref 32–75)
NITRITE UR QL STRIP.AUTO: NEGATIVE
NRBC # BLD: 0 K/UL (ref 0–0.01)
NRBC BLD-RTO: 0 PER 100 WBC
PH UR STRIP: 5 (ref 5–8)
PLATELET # BLD AUTO: 192 K/UL (ref 150–400)
PMV BLD AUTO: 10 FL (ref 8.9–12.9)
POTASSIUM SERPL-SCNC: 4.3 MMOL/L (ref 3.5–5.1)
PROT SERPL-MCNC: 7.1 G/DL (ref 6.4–8.2)
PROT UR STRIP-MCNC: ABNORMAL MG/DL
RBC # BLD AUTO: 4.51 M/UL (ref 4.1–5.7)
RBC #/AREA URNS HPF: ABNORMAL /HPF (ref 0–5)
SAMPLES BEING HELD,HOLD: NORMAL
SODIUM SERPL-SCNC: 132 MMOL/L (ref 136–145)
SP GR UR REFRACTOMETRY: 1.03 (ref 1–1.03)
UR CULT HOLD, URHOLD: NORMAL
UROBILINOGEN UR QL STRIP.AUTO: 0.2 EU/DL (ref 0.2–1)
WBC # BLD AUTO: 8.9 K/UL (ref 4.1–11.1)
WBC URNS QL MICRO: ABNORMAL /HPF (ref 0–4)

## 2023-04-22 PROCEDURE — 80053 COMPREHEN METABOLIC PANEL: CPT

## 2023-04-22 PROCEDURE — 81001 URINALYSIS AUTO W/SCOPE: CPT

## 2023-04-22 PROCEDURE — 85025 COMPLETE CBC W/AUTO DIFF WBC: CPT

## 2023-04-22 PROCEDURE — 74011250636 HC RX REV CODE- 250/636: Performed by: STUDENT IN AN ORGANIZED HEALTH CARE EDUCATION/TRAINING PROGRAM

## 2023-04-22 PROCEDURE — 96361 HYDRATE IV INFUSION ADD-ON: CPT

## 2023-04-22 PROCEDURE — 74176 CT ABD & PELVIS W/O CONTRAST: CPT

## 2023-04-22 PROCEDURE — 96375 TX/PRO/DX INJ NEW DRUG ADDON: CPT

## 2023-04-22 PROCEDURE — 36415 COLL VENOUS BLD VENIPUNCTURE: CPT

## 2023-04-22 PROCEDURE — 96374 THER/PROPH/DIAG INJ IV PUSH: CPT

## 2023-04-22 PROCEDURE — 99284 EMERGENCY DEPT VISIT MOD MDM: CPT

## 2023-04-22 RX ORDER — KETOROLAC TROMETHAMINE 30 MG/ML
15 INJECTION, SOLUTION INTRAMUSCULAR; INTRAVENOUS
Status: COMPLETED | OUTPATIENT
Start: 2023-04-22 | End: 2023-04-22

## 2023-04-22 RX ORDER — ONDANSETRON 2 MG/ML
4 INJECTION INTRAMUSCULAR; INTRAVENOUS ONCE
Status: COMPLETED | OUTPATIENT
Start: 2023-04-22 | End: 2023-04-22

## 2023-04-22 RX ADMIN — ONDANSETRON 4 MG: 2 INJECTION INTRAMUSCULAR; INTRAVENOUS at 23:08

## 2023-04-22 RX ADMIN — KETOROLAC TROMETHAMINE 15 MG: 30 INJECTION, SOLUTION INTRAMUSCULAR at 23:08

## 2023-04-22 RX ADMIN — SODIUM CHLORIDE 1000 ML: 9 INJECTION, SOLUTION INTRAVENOUS at 23:07

## 2023-04-23 PROCEDURE — 74011000250 HC RX REV CODE- 250: Performed by: STUDENT IN AN ORGANIZED HEALTH CARE EDUCATION/TRAINING PROGRAM

## 2023-04-23 PROCEDURE — 74011250637 HC RX REV CODE- 250/637: Performed by: STUDENT IN AN ORGANIZED HEALTH CARE EDUCATION/TRAINING PROGRAM

## 2023-04-23 RX ORDER — CYCLOBENZAPRINE HCL 10 MG
10 TABLET ORAL
Status: COMPLETED | OUTPATIENT
Start: 2023-04-23 | End: 2023-04-23

## 2023-04-23 RX ORDER — POLYETHYLENE GLYCOL 3350 17 G/17G
17 POWDER, FOR SOLUTION ORAL DAILY
Qty: 289 G | Refills: 0 | Status: SHIPPED | OUTPATIENT
Start: 2023-04-23

## 2023-04-23 RX ORDER — LIDOCAINE 50 MG/G
1 PATCH TOPICAL EVERY 24 HOURS
Qty: 15 EACH | Refills: 0 | Status: SHIPPED | OUTPATIENT
Start: 2023-04-23

## 2023-04-23 RX ORDER — NAPROXEN 500 MG/1
500 TABLET ORAL
Qty: 20 TABLET | Refills: 0 | Status: SHIPPED | OUTPATIENT
Start: 2023-04-23

## 2023-04-23 RX ORDER — OXYCODONE AND ACETAMINOPHEN 5; 325 MG/1; MG/1
1 TABLET ORAL
Status: COMPLETED | OUTPATIENT
Start: 2023-04-23 | End: 2023-04-23

## 2023-04-23 RX ORDER — CYCLOBENZAPRINE HCL 10 MG
10 TABLET ORAL
Qty: 15 TABLET | Refills: 0 | Status: SHIPPED | OUTPATIENT
Start: 2023-04-23

## 2023-04-23 RX ORDER — LIDOCAINE 4 G/100G
1 PATCH TOPICAL ONCE
Status: DISCONTINUED | OUTPATIENT
Start: 2023-04-23 | End: 2023-04-23 | Stop reason: HOSPADM

## 2023-04-23 RX ADMIN — OXYCODONE HYDROCHLORIDE AND ACETAMINOPHEN 1 TABLET: 5; 325 TABLET ORAL at 01:50

## 2023-04-23 RX ADMIN — CYCLOBENZAPRINE 10 MG: 10 TABLET, FILM COATED ORAL at 01:50

## 2023-06-26 ENCOUNTER — OFFICE VISIT (OUTPATIENT)
Age: 53
End: 2023-06-26
Payer: COMMERCIAL

## 2023-06-26 VITALS
BODY MASS INDEX: 25.12 KG/M2 | OXYGEN SATURATION: 98 % | DIASTOLIC BLOOD PRESSURE: 80 MMHG | SYSTOLIC BLOOD PRESSURE: 128 MMHG | HEIGHT: 75 IN | TEMPERATURE: 98 F | RESPIRATION RATE: 16 BRPM | HEART RATE: 68 BPM | WEIGHT: 202 LBS

## 2023-06-26 DIAGNOSIS — F98.8 ATTENTION DEFICIT DISORDER (ADD) WITHOUT HYPERACTIVITY: Primary | ICD-10-CM

## 2023-06-26 DIAGNOSIS — Z92.29 HISTORY OF TETANUS, DIPHTHERIA, AND ACELLULAR PERTUSSIS BOOSTER VACCINATION (TDAP): ICD-10-CM

## 2023-06-26 DIAGNOSIS — Z82.49 FAMILY HISTORY OF PREMATURE CAD: ICD-10-CM

## 2023-06-26 DIAGNOSIS — Z11.4 SCREENING FOR HIV WITHOUT PRESENCE OF RISK FACTORS: ICD-10-CM

## 2023-06-26 DIAGNOSIS — E78.2 MIXED HYPERLIPIDEMIA: ICD-10-CM

## 2023-06-26 DIAGNOSIS — Z13.1 ENCOUNTER FOR SCREENING FOR DIABETES MELLITUS: ICD-10-CM

## 2023-06-26 DIAGNOSIS — Z51.81 MEDICATION MONITORING ENCOUNTER: ICD-10-CM

## 2023-06-26 PROCEDURE — 99214 OFFICE O/P EST MOD 30 MIN: CPT | Performed by: FAMILY MEDICINE

## 2023-06-26 RX ORDER — ATORVASTATIN CALCIUM 10 MG/1
10 TABLET, FILM COATED ORAL DAILY
Qty: 90 TABLET | Refills: 1 | Status: SHIPPED | OUTPATIENT
Start: 2023-06-26

## 2023-06-26 RX ORDER — CYANOCOBALAMIN (VITAMIN B-12) 500 MCG
TABLET ORAL
COMMUNITY

## 2023-06-26 SDOH — ECONOMIC STABILITY: INCOME INSECURITY: HOW HARD IS IT FOR YOU TO PAY FOR THE VERY BASICS LIKE FOOD, HOUSING, MEDICAL CARE, AND HEATING?: NOT HARD AT ALL

## 2023-06-26 SDOH — ECONOMIC STABILITY: FOOD INSECURITY: WITHIN THE PAST 12 MONTHS, YOU WORRIED THAT YOUR FOOD WOULD RUN OUT BEFORE YOU GOT MONEY TO BUY MORE.: NEVER TRUE

## 2023-06-26 SDOH — ECONOMIC STABILITY: HOUSING INSECURITY
IN THE LAST 12 MONTHS, WAS THERE A TIME WHEN YOU DID NOT HAVE A STEADY PLACE TO SLEEP OR SLEPT IN A SHELTER (INCLUDING NOW)?: NO

## 2023-06-26 SDOH — ECONOMIC STABILITY: FOOD INSECURITY: WITHIN THE PAST 12 MONTHS, THE FOOD YOU BOUGHT JUST DIDN'T LAST AND YOU DIDN'T HAVE MONEY TO GET MORE.: NEVER TRUE

## 2023-06-26 ASSESSMENT — ENCOUNTER SYMPTOMS
SHORTNESS OF BREATH: 0
ABDOMINAL PAIN: 0

## 2023-06-26 ASSESSMENT — PATIENT HEALTH QUESTIONNAIRE - PHQ9
2. FEELING DOWN, DEPRESSED OR HOPELESS: 0
SUM OF ALL RESPONSES TO PHQ QUESTIONS 1-9: 0
SUM OF ALL RESPONSES TO PHQ QUESTIONS 1-9: 0
1. LITTLE INTEREST OR PLEASURE IN DOING THINGS: 0
SUM OF ALL RESPONSES TO PHQ QUESTIONS 1-9: 0
SUM OF ALL RESPONSES TO PHQ QUESTIONS 1-9: 0
SUM OF ALL RESPONSES TO PHQ9 QUESTIONS 1 & 2: 0

## 2023-06-27 LAB
ALBUMIN SERPL-MCNC: 4.2 G/DL (ref 3.5–5)
ALBUMIN/GLOB SERPL: 1.6 (ref 1.1–2.2)
ALP SERPL-CCNC: 66 U/L (ref 45–117)
ALT SERPL-CCNC: 32 U/L (ref 12–78)
ANION GAP SERPL CALC-SCNC: 4 MMOL/L (ref 5–15)
AST SERPL-CCNC: 20 U/L (ref 15–37)
BILIRUB SERPL-MCNC: 0.4 MG/DL (ref 0.2–1)
BUN SERPL-MCNC: 20 MG/DL (ref 6–20)
BUN/CREAT SERPL: 18 (ref 12–20)
CALCIUM SERPL-MCNC: 9 MG/DL (ref 8.5–10.1)
CHLORIDE SERPL-SCNC: 106 MMOL/L (ref 97–108)
CHOLEST SERPL-MCNC: 230 MG/DL
CO2 SERPL-SCNC: 28 MMOL/L (ref 21–32)
CREAT SERPL-MCNC: 1.1 MG/DL (ref 0.7–1.3)
GLOBULIN SER CALC-MCNC: 2.6 G/DL (ref 2–4)
GLUCOSE P FAST SERPL-MCNC: 93 MG/DL (ref 65–100)
GLUCOSE SERPL-MCNC: 93 MG/DL (ref 65–100)
HDLC SERPL-MCNC: 56 MG/DL
HDLC SERPL: 4.1 (ref 0–5)
HIV 1+2 AB+HIV1 P24 AG SERPL QL IA: NONREACTIVE
HIV 1/2 RESULT COMMENT: NORMAL
LDLC SERPL CALC-MCNC: 158.6 MG/DL (ref 0–100)
POTASSIUM SERPL-SCNC: 4.3 MMOL/L (ref 3.5–5.1)
PROT SERPL-MCNC: 6.8 G/DL (ref 6.4–8.2)
SODIUM SERPL-SCNC: 138 MMOL/L (ref 136–145)
TRIGL SERPL-MCNC: 77 MG/DL
VLDLC SERPL CALC-MCNC: 15.4 MG/DL

## 2023-07-06 ENCOUNTER — HOSPITAL ENCOUNTER (OUTPATIENT)
Facility: HOSPITAL | Age: 53
Discharge: HOME | End: 2023-07-06
Attending: FAMILY MEDICINE

## 2023-07-06 DIAGNOSIS — E78.2 MIXED HYPERLIPIDEMIA: ICD-10-CM

## 2023-07-06 DIAGNOSIS — Z82.49 FAMILY HISTORY OF PREMATURE CAD: ICD-10-CM

## 2023-07-06 PROCEDURE — 75571 CT HRT W/O DYE W/CA TEST: CPT

## 2024-02-12 DIAGNOSIS — F98.8 ATTENTION DEFICIT DISORDER (ADD) WITHOUT HYPERACTIVITY: ICD-10-CM

## 2024-02-12 DIAGNOSIS — E78.2 MIXED HYPERLIPIDEMIA: ICD-10-CM

## 2024-02-12 NOTE — TELEPHONE ENCOUNTER
Refill request for Vyvanse- 10mg and 40mg.  Check .  Thanks, Liana    Last appointment: 12/18/23 Rahul  Next appointment: 319/24 Rahul  Previous refill encounter(s): 12/18/23 (both)  Vyvanse  10mg 30  Vyvanse 40mg 30  Atorvastatin 6/26/23 90 + 1    Requested Prescriptions     Pending Prescriptions Disp Refills    lisdexamfetamine (VYVANSE) 10 MG capsule 30 capsule 0     Sig: Take 1 capsule by mouth Daily with lunch for 30 days. Max Daily Amount: 10 mg    Lisdexamfetamine Dimesylate 40 MG CAPS 30 capsule 0     Sig: Take 40 mg by mouth every morning for 30 days. Max Daily Amount: 40 mg    atorvastatin (LIPITOR) 10 MG tablet 90 tablet 1     Sig: Take 1 tablet by mouth daily     For Pharmacy Admin Tracking Only    Program: Medication Refill  CPA in place:    Recommendation Provided To:   Intervention Detail: New Rx: 3, reason: Patient Preference  Intervention Accepted By:   Gap Closed?:    Time Spent (min): 5

## 2024-02-14 RX ORDER — LISDEXAMFETAMINE DIMESYLATE CAPSULES 40 MG/1
40 CAPSULE ORAL EVERY MORNING
Qty: 30 CAPSULE | Refills: 0 | Status: SHIPPED | OUTPATIENT
Start: 2024-02-14 | End: 2024-03-15

## 2024-02-14 RX ORDER — ATORVASTATIN CALCIUM 10 MG/1
10 TABLET, FILM COATED ORAL DAILY
Qty: 90 TABLET | Refills: 1 | Status: SHIPPED | OUTPATIENT
Start: 2024-02-14

## 2024-02-14 RX ORDER — LISDEXAMFETAMINE DIMESYLATE CAPSULES 10 MG/1
10 CAPSULE ORAL
Qty: 30 CAPSULE | Refills: 0 | Status: SHIPPED | OUTPATIENT
Start: 2024-02-14 | End: 2024-03-15

## 2024-04-03 ENCOUNTER — TELEPHONE (OUTPATIENT)
Age: 54
End: 2024-04-03

## 2024-04-03 DIAGNOSIS — F98.8 ATTENTION DEFICIT DISORDER (ADD) WITHOUT HYPERACTIVITY: ICD-10-CM

## 2024-04-03 NOTE — TELEPHONE ENCOUNTER
PCP: Vern Kay MD    Last appt: 3/18/2024       Future Appointments   Date Time Provider Department Center   5/13/2024 12:30 PM Vern Kay MD [facility]       Requested Prescriptions     Pending Prescriptions Disp Refills    Lisdexamfetamine Dimesylate 40 MG CAPS 30 capsule 0     Sig: Take 40 mg by mouth every morning for 30 days. Max Daily Amount: 40 mg       Prior labs and Blood pressures:  BP Readings from Last 3 Encounters:   12/18/23 118/70   06/26/23 128/80   01/27/23 124/84     Lab Results   Component Value Date/Time     06/26/2023 11:54 AM    K 4.3 06/26/2023 11:54 AM     06/26/2023 11:54 AM    CO2 28 06/26/2023 11:54 AM    BUN 20 06/26/2023 11:54 AM    GFRAA >60 06/27/2022 10:31 AM     No results found for: \"HBA1C\", \"DPK0HEMF\"  Lab Results   Component Value Date/Time    CHOL 230 06/26/2023 11:54 AM    HDL 56 06/26/2023 11:54 AM     No results found for: \"VITD3\", \"VD3RIA\"    Lab Results   Component Value Date/Time    TSH 1.01 01/16/2023 09:49 AM

## 2024-04-03 NOTE — TELEPHONE ENCOUNTER
Spoke to pt and inform him VV he has 4/9 needed to be reschedule due to Dr. Kay out for family emergency. Pt has be rescheduled to 5/13. Pt then ask if another provider can send a temporary refill of his Vyvanse to his pharmacy enough to last until visit?-TM 4/3/24

## 2024-04-15 RX ORDER — LISDEXAMFETAMINE DIMESYLATE CAPSULES 40 MG/1
40 CAPSULE ORAL EVERY MORNING
Qty: 30 CAPSULE | Refills: 0 | Status: SHIPPED | OUTPATIENT
Start: 2024-04-15 | End: 2024-05-15

## 2024-05-13 ENCOUNTER — TELEMEDICINE (OUTPATIENT)
Age: 54
End: 2024-05-13
Payer: COMMERCIAL

## 2024-05-13 DIAGNOSIS — E78.2 MIXED HYPERLIPIDEMIA: ICD-10-CM

## 2024-05-13 DIAGNOSIS — F98.8 ATTENTION DEFICIT DISORDER (ADD) WITHOUT HYPERACTIVITY: Primary | ICD-10-CM

## 2024-05-13 PROCEDURE — 99213 OFFICE O/P EST LOW 20 MIN: CPT | Performed by: FAMILY MEDICINE

## 2024-05-13 RX ORDER — LISDEXAMFETAMINE DIMESYLATE 40 MG/1
40 CAPSULE ORAL DAILY
Qty: 30 CAPSULE | Refills: 0 | Status: SHIPPED | OUTPATIENT
Start: 2024-07-12 | End: 2024-08-11

## 2024-05-13 RX ORDER — LISDEXAMFETAMINE DIMESYLATE 40 MG/1
40 CAPSULE ORAL DAILY
Qty: 30 CAPSULE | Refills: 0 | Status: SHIPPED | OUTPATIENT
Start: 2024-06-12 | End: 2024-07-12

## 2024-05-13 RX ORDER — LISDEXAMFETAMINE DIMESYLATE 40 MG/1
40 CAPSULE ORAL EVERY MORNING
Qty: 30 CAPSULE | Refills: 0 | Status: SHIPPED | OUTPATIENT
Start: 2024-08-13 | End: 2024-09-12

## 2024-05-13 RX ORDER — LISDEXAMFETAMINE DIMESYLATE 40 MG/1
40 CAPSULE ORAL DAILY
Qty: 30 CAPSULE | Refills: 0 | Status: SHIPPED | OUTPATIENT
Start: 2024-05-13 | End: 2024-06-12

## 2024-05-13 ASSESSMENT — PATIENT HEALTH QUESTIONNAIRE - PHQ9
SUM OF ALL RESPONSES TO PHQ QUESTIONS 1-9: 0
SUM OF ALL RESPONSES TO PHQ QUESTIONS 1-9: 0
2. FEELING DOWN, DEPRESSED OR HOPELESS: NOT AT ALL
SUM OF ALL RESPONSES TO PHQ QUESTIONS 1-9: 0
SUM OF ALL RESPONSES TO PHQ QUESTIONS 1-9: 0
SUM OF ALL RESPONSES TO PHQ9 QUESTIONS 1 & 2: 0
1. LITTLE INTEREST OR PLEASURE IN DOING THINGS: NOT AT ALL

## 2024-05-13 NOTE — PROGRESS NOTES
Jose Clarke is a 53 y.o. male who was seen by synchronous (real-time) audio-video technology on 5/13/2024.      Consent:  Services were provided through a video synchronous discussion virtually to substitute for in-person appointment.   He and/or his healthcare decision maker is aware that this patient-initiated Telehealth encounter is a billable service, with coverage as determined by his insurance carrier. He is aware that he may receive a bill and has provided verbal consent to proceed: Yes    I was in the office while conducting this encounter.    Subjective:   Jose Clarke was seen for follow up.     He is overall doing well.     ADD: Pt has been taking Vyvanse 40mg daily. He reports he has been doing well with this dose. He was supposed to be taking an additional 10mg for a total of 50mg daily, but wasn't able to get the 10mg tablets. He reports a decrease in ruminating on negative thoughts like he was having on 60mg daily. He would like to continue with Vyvanse 40mg daily.     Hyperlipidemia: Lipid panel on 3/19/24 notable for total cholesterol 292, HDL 59, .0, and triglycerides 120. He reports he stopped taking atorvastatin because it was causing brain fog. He also wanted to check his coronary calcium score without the medication. His last calcium score was 0 on 7/6/23. He walks 4 miles with his dog 3x/week.     Hypothyroidism: Pt's TSH level was 1.34 on 3/19/24. Pt continues with levothyroxine 137mcg daily.     Prior to Admission medications    Medication Sig Start Date End Date Taking? Authorizing Provider   Lisdexamfetamine Dimesylate 40 MG CAPS Take 40 mg by mouth every morning for 30 days. Max Daily Amount: 40 mg 4/15/24 5/15/24  Vern Kay MD   lisdexamfetamine (VYVANSE) 10 MG capsule Take 1 capsule by mouth Daily with lunch for 30 days. Max Daily Amount: 10 mg 2/14/24 3/15/24  Vern Kay MD   atorvastatin (LIPITOR) 10 MG tablet Take 1 tablet by mouth daily 2/14/24   Rahul

## 2024-06-03 ENCOUNTER — HOSPITAL ENCOUNTER (OUTPATIENT)
Facility: HOSPITAL | Age: 54
Discharge: HOME | End: 2024-06-06
Attending: FAMILY MEDICINE

## 2024-06-03 DIAGNOSIS — E78.2 MIXED HYPERLIPIDEMIA: ICD-10-CM

## 2024-06-03 PROCEDURE — 75571 CT HRT W/O DYE W/CA TEST: CPT

## 2024-07-29 DIAGNOSIS — F98.8 ATTENTION DEFICIT DISORDER (ADD) WITHOUT HYPERACTIVITY: ICD-10-CM

## 2024-07-29 RX ORDER — LISDEXAMFETAMINE DIMESYLATE 40 MG/1
40 CAPSULE ORAL DAILY
Qty: 30 CAPSULE | Refills: 0 | Status: SHIPPED | OUTPATIENT
Start: 2024-08-12 | End: 2024-09-12

## 2024-08-22 DIAGNOSIS — F98.8 ATTENTION DEFICIT DISORDER (ADD) WITHOUT HYPERACTIVITY: ICD-10-CM

## 2024-08-22 DIAGNOSIS — Z51.81 MEDICATION MONITORING ENCOUNTER: ICD-10-CM

## 2024-08-28 LAB — DRUGS UR: NORMAL

## 2024-09-03 ENCOUNTER — TELEPHONE (OUTPATIENT)
Age: 54
End: 2024-09-03

## 2024-09-03 ENCOUNTER — TELEMEDICINE (OUTPATIENT)
Age: 54
End: 2024-09-03
Payer: COMMERCIAL

## 2024-09-03 DIAGNOSIS — F98.8 ATTENTION DEFICIT DISORDER (ADD) WITHOUT HYPERACTIVITY: Primary | ICD-10-CM

## 2024-09-03 DIAGNOSIS — M72.2 PLANTAR FASCIITIS, LEFT: ICD-10-CM

## 2024-09-03 PROCEDURE — 99213 OFFICE O/P EST LOW 20 MIN: CPT | Performed by: FAMILY MEDICINE

## 2024-09-03 RX ORDER — LISDEXAMFETAMINE DIMESYLATE 40 MG/1
40 CAPSULE ORAL DAILY
Qty: 30 CAPSULE | Refills: 0 | Status: SHIPPED | OUTPATIENT
Start: 2024-10-03 | End: 2024-11-02

## 2024-09-03 RX ORDER — LISDEXAMFETAMINE DIMESYLATE 40 MG/1
40 CAPSULE ORAL DAILY
Qty: 30 CAPSULE | Refills: 0 | Status: SHIPPED | OUTPATIENT
Start: 2024-09-03 | End: 2024-10-03

## 2024-09-03 RX ORDER — LISDEXAMFETAMINE DIMESYLATE 40 MG/1
40 CAPSULE ORAL DAILY
Qty: 30 CAPSULE | Refills: 0 | Status: SHIPPED | OUTPATIENT
Start: 2024-11-02 | End: 2024-12-02

## 2024-09-03 SDOH — ECONOMIC STABILITY: FOOD INSECURITY: WITHIN THE PAST 12 MONTHS, YOU WORRIED THAT YOUR FOOD WOULD RUN OUT BEFORE YOU GOT MONEY TO BUY MORE.: NEVER TRUE

## 2024-09-03 SDOH — ECONOMIC STABILITY: INCOME INSECURITY: HOW HARD IS IT FOR YOU TO PAY FOR THE VERY BASICS LIKE FOOD, HOUSING, MEDICAL CARE, AND HEATING?: NOT HARD AT ALL

## 2024-09-03 SDOH — ECONOMIC STABILITY: FOOD INSECURITY: WITHIN THE PAST 12 MONTHS, THE FOOD YOU BOUGHT JUST DIDN'T LAST AND YOU DIDN'T HAVE MONEY TO GET MORE.: NEVER TRUE

## 2024-09-03 ASSESSMENT — PATIENT HEALTH QUESTIONNAIRE - PHQ9
SUM OF ALL RESPONSES TO PHQ QUESTIONS 1-9: 0
SUM OF ALL RESPONSES TO PHQ9 QUESTIONS 1 & 2: 0
2. FEELING DOWN, DEPRESSED OR HOPELESS: NOT AT ALL
1. LITTLE INTEREST OR PLEASURE IN DOING THINGS: NOT AT ALL
SUM OF ALL RESPONSES TO PHQ QUESTIONS 1-9: 0

## 2024-09-03 ASSESSMENT — ENCOUNTER SYMPTOMS
SHORTNESS OF BREATH: 0
ABDOMINAL PAIN: 0

## 2024-09-03 NOTE — PROGRESS NOTES
visualized signs of difficulty breathing or respiratory distress        [] Abnormal -      Musculoskeletal:   [x] Normal gait with no signs of ataxia         [x] Normal range of motion of neck        [] Abnormal -     Neurological:        [x] No Facial Asymmetry (Cranial nerve 7 motor function) (limited exam due to video visit)          [x] No gaze palsy        [] Abnormal -          Skin:        [x] No significant exanthematous lesions or discoloration noted on facial skin         [] Abnormal -            Psychiatric:       [x] Normal Affect [] Abnormal -        [x] No Hallucinations    Other pertinent observable physical exam findings:-             Jose Clarke, was evaluated through a synchronous (real-time) audio-video encounter. The patient (or guardian if applicable) is aware that this is a billable service, which includes applicable co-pays. This Virtual Visit was conducted with patient's (and/or legal guardian's) consent. Patient identification was verified, and a caregiver was present when appropriate.   The patient was located at Home: [address] [place] [address]  Provider was located at Facility (Appt Dept): [address] [place] [address]      The patient (or guardian, if applicable) and other individuals in attendance with the patient were advised that Artificial Intelligence will be utilized during this visit to record and process the conversation to generate a clinical note. The patient (or guardian, if applicable) and other individuals in attendance at the appointment consented to the use of AI, including the recording.                    Vern Kay MD

## 2024-09-03 NOTE — TELEPHONE ENCOUNTER
----- Message from Dr. Vern Kay MD sent at 9/3/2024  8:00 AM EDT -----  Regarding: appt  Patient needs a 3-month follow-up which can be virtual

## 2024-09-19 ENCOUNTER — HOSPITAL ENCOUNTER (OUTPATIENT)
Facility: HOSPITAL | Age: 54
Discharge: HOME | End: 2024-09-22
Attending: FAMILY MEDICINE
Payer: COMMERCIAL

## 2024-09-19 DIAGNOSIS — M72.2 PLANTAR FASCIITIS, LEFT: ICD-10-CM

## 2024-09-19 PROCEDURE — 73650 X-RAY EXAM OF HEEL: CPT

## 2024-12-03 ENCOUNTER — TELEMEDICINE (OUTPATIENT)
Age: 54
End: 2024-12-03
Payer: COMMERCIAL

## 2024-12-03 ENCOUNTER — TELEPHONE (OUTPATIENT)
Age: 54
End: 2024-12-03

## 2024-12-03 DIAGNOSIS — F41.9 ANXIETY: ICD-10-CM

## 2024-12-03 DIAGNOSIS — F98.8 ATTENTION DEFICIT DISORDER (ADD) WITHOUT HYPERACTIVITY: Primary | ICD-10-CM

## 2024-12-03 PROCEDURE — 99213 OFFICE O/P EST LOW 20 MIN: CPT | Performed by: FAMILY MEDICINE

## 2024-12-03 RX ORDER — LISDEXAMFETAMINE DIMESYLATE 40 MG/1
40 CAPSULE ORAL DAILY
Qty: 30 CAPSULE | Refills: 0 | Status: SHIPPED | OUTPATIENT
Start: 2025-02-03 | End: 2025-03-05

## 2024-12-03 RX ORDER — LISDEXAMFETAMINE DIMESYLATE 40 MG/1
40 CAPSULE ORAL DAILY
Qty: 30 CAPSULE | Refills: 0 | Status: SHIPPED | OUTPATIENT
Start: 2025-01-04 | End: 2025-02-03

## 2024-12-03 RX ORDER — LISDEXAMFETAMINE DIMESYLATE 40 MG/1
40 CAPSULE ORAL DAILY
Qty: 30 CAPSULE | Refills: 0 | Status: SHIPPED | OUTPATIENT
Start: 2024-12-05 | End: 2025-01-04

## 2024-12-03 NOTE — PROGRESS NOTES
12/3/2024    TELEHEALTH EVALUATION -- Audio/Visual    HPI:    Jose Clarke (:  1970) has requested an audio/video evaluation for the following concern(s):    He is overall doing well and had a nice Thanksgiving.     Pt is interested in establishing with a psychiatrist/psychologist to help with public speaking/paranoia/anxiety. He reports this used to be worse when he was on a higher dose of Vyvanse (symptoms improved with reduced dose). When he was on Vyvanse 60mg daily he was having more ruminating thoughts. Even those things are better now, he would still like to see someone to help better manage his symptoms.     ADD: Pt continues with Vyvanse 40mg daily. He is happy with his current dose and requests refill.     Health Maintenance:   Flu Shot: due, planning to get  COVID Booster: due, advised to get   Tdap: , plan to get booster in   Heplisav-B: due, will discuss at next OV  Colonoscopy:  with Dr. Sevilla, f/u 3-5 years, requesting records, tentative plan to repeat     Review of Systems   Constitutional:  Negative for chills and fever.   HENT:  Negative for hearing loss and tinnitus.    Eyes:  Negative for visual disturbance.   Respiratory:  Negative for cough and shortness of breath.    Cardiovascular:  Negative for chest pain and palpitations.   Gastrointestinal:  Negative for nausea and vomiting.   Genitourinary:  Negative for dysuria and frequency.   Musculoskeletal:  Negative for back pain.   Skin:  Negative for rash.   Allergic/Immunologic: Negative for environmental allergies.   Neurological:  Negative for dizziness, syncope and headaches.   Psychiatric/Behavioral:  Negative for dysphoric mood. The patient is not nervous/anxious.        Prior to Visit Medications    Medication Sig Taking? Authorizing Provider   Lisdexamfetamine Dimesylate 40 MG CAPS Take 40 mg by mouth daily for 30 days. Max Daily Amount: 40 mg  Vern Kay MD   Lisdexamfetamine Dimesylate 40 MG CAPS Take

## 2024-12-03 NOTE — TELEPHONE ENCOUNTER
----- Message from Dr. Vern Kay MD sent at 12/3/2024  1:17 PM EST -----  Regarding: 3 month  Please set pt up for in office appt in 3 months

## 2025-02-26 ENCOUNTER — TELEPHONE (OUTPATIENT)
Age: 55
End: 2025-02-26

## 2025-03-05 DIAGNOSIS — F98.8 ATTENTION DEFICIT DISORDER (ADD) WITHOUT HYPERACTIVITY: ICD-10-CM

## 2025-03-05 NOTE — TELEPHONE ENCOUNTER
PCP: Vern Kay MD    Last appt: 12/3/2024   Future Appointments   Date Time Provider Department Center   3/24/2025  9:45 AM Vern Kay MD [facility] DEP       Requested Prescriptions     Pending Prescriptions Disp Refills    Lisdexamfetamine Dimesylate 40 MG CAPS 30 capsule 0     Sig: Take 40 mg by mouth daily for 30 days. Max Daily Amount: 40 mg         Prior labs and Blood pressures:  BP Readings from Last 3 Encounters:   12/18/23 118/70   06/26/23 128/80   01/27/23 124/84     Lab Results   Component Value Date/Time     06/26/2023 11:54 AM    K 4.3 06/26/2023 11:54 AM     06/26/2023 11:54 AM    CO2 28 06/26/2023 11:54 AM    BUN 20 06/26/2023 11:54 AM    GFRAA >60 06/27/2022 10:31 AM     No results found for: \"HBA1C\", \"[id number]\"  Lab Results   Component Value Date/Time    CHOL 230 06/26/2023 11:54 AM    HDL 56 06/26/2023 11:54 AM    .6 06/26/2023 11:54 AM    VLDL 15.4 06/26/2023 11:54 AM     No results found for: \"VITD3\"    Lab Results   Component Value Date/Time    TSH 1.01 01/16/2023 09:49 AM

## 2025-03-06 RX ORDER — LISDEXAMFETAMINE DIMESYLATE 40 MG/1
40 CAPSULE ORAL DAILY
Qty: 30 CAPSULE | Refills: 0 | Status: SHIPPED | OUTPATIENT
Start: 2025-03-06 | End: 2025-04-05

## 2025-03-07 NOTE — TELEPHONE ENCOUNTER
reviewed. Vyvanse 40mg cap #30 last filled 2/4/2025.  CSA and UDS on file. Temporary refill provided. PCP out of the office.

## 2025-03-19 LAB — HBA1C MFR BLD HPLC: 5.9 %

## 2025-03-24 ENCOUNTER — OFFICE VISIT (OUTPATIENT)
Age: 55
End: 2025-03-24
Payer: COMMERCIAL

## 2025-03-24 VITALS
RESPIRATION RATE: 18 BRPM | HEIGHT: 75 IN | DIASTOLIC BLOOD PRESSURE: 73 MMHG | OXYGEN SATURATION: 96 % | TEMPERATURE: 97.1 F | HEART RATE: 99 BPM | BODY MASS INDEX: 25.86 KG/M2 | WEIGHT: 208 LBS | SYSTOLIC BLOOD PRESSURE: 113 MMHG

## 2025-03-24 DIAGNOSIS — F98.8 ATTENTION DEFICIT DISORDER (ADD) WITHOUT HYPERACTIVITY: Primary | ICD-10-CM

## 2025-03-24 DIAGNOSIS — Z23 IMMUNIZATION DUE: ICD-10-CM

## 2025-03-24 DIAGNOSIS — E03.9 ACQUIRED HYPOTHYROIDISM: ICD-10-CM

## 2025-03-24 DIAGNOSIS — E78.2 MIXED HYPERLIPIDEMIA: ICD-10-CM

## 2025-03-24 DIAGNOSIS — R73.03 PRE-DIABETES: ICD-10-CM

## 2025-03-24 PROCEDURE — 90472 IMMUNIZATION ADMIN EACH ADD: CPT | Performed by: FAMILY MEDICINE

## 2025-03-24 PROCEDURE — 90471 IMMUNIZATION ADMIN: CPT | Performed by: FAMILY MEDICINE

## 2025-03-24 PROCEDURE — 90715 TDAP VACCINE 7 YRS/> IM: CPT | Performed by: FAMILY MEDICINE

## 2025-03-24 PROCEDURE — 90677 PCV20 VACCINE IM: CPT | Performed by: FAMILY MEDICINE

## 2025-03-24 PROCEDURE — 99214 OFFICE O/P EST MOD 30 MIN: CPT | Performed by: FAMILY MEDICINE

## 2025-03-24 RX ORDER — LISDEXAMFETAMINE DIMESYLATE 40 MG/1
40 CAPSULE ORAL DAILY
Qty: 30 CAPSULE | Refills: 0 | Status: SHIPPED | OUTPATIENT
Start: 2025-05-06 | End: 2025-06-05

## 2025-03-24 RX ORDER — LISDEXAMFETAMINE DIMESYLATE 40 MG/1
40 CAPSULE ORAL DAILY
Qty: 30 CAPSULE | Refills: 0 | Status: SHIPPED | OUTPATIENT
Start: 2025-04-06 | End: 2025-05-06

## 2025-03-24 RX ORDER — LISDEXAMFETAMINE DIMESYLATE 40 MG/1
40 CAPSULE ORAL DAILY
Qty: 30 CAPSULE | Refills: 0 | Status: SHIPPED | OUTPATIENT
Start: 2025-06-05 | End: 2025-07-05

## 2025-03-24 SDOH — ECONOMIC STABILITY: FOOD INSECURITY: WITHIN THE PAST 12 MONTHS, YOU WORRIED THAT YOUR FOOD WOULD RUN OUT BEFORE YOU GOT MONEY TO BUY MORE.: NEVER TRUE

## 2025-03-24 SDOH — ECONOMIC STABILITY: FOOD INSECURITY: WITHIN THE PAST 12 MONTHS, THE FOOD YOU BOUGHT JUST DIDN'T LAST AND YOU DIDN'T HAVE MONEY TO GET MORE.: NEVER TRUE

## 2025-03-24 SDOH — ECONOMIC STABILITY: INCOME INSECURITY: IN THE LAST 12 MONTHS, WAS THERE A TIME WHEN YOU WERE NOT ABLE TO PAY THE MORTGAGE OR RENT ON TIME?: NO

## 2025-03-24 SDOH — ECONOMIC STABILITY: TRANSPORTATION INSECURITY
IN THE PAST 12 MONTHS, HAS LACK OF TRANSPORTATION KEPT YOU FROM MEETINGS, WORK, OR FROM GETTING THINGS NEEDED FOR DAILY LIVING?: NO

## 2025-03-24 SDOH — ECONOMIC STABILITY: TRANSPORTATION INSECURITY
IN THE PAST 12 MONTHS, HAS THE LACK OF TRANSPORTATION KEPT YOU FROM MEDICAL APPOINTMENTS OR FROM GETTING MEDICATIONS?: NO

## 2025-03-24 ASSESSMENT — ENCOUNTER SYMPTOMS
VOMITING: 0
BACK PAIN: 0
COUGH: 0
SHORTNESS OF BREATH: 0
NAUSEA: 0

## 2025-03-24 ASSESSMENT — PATIENT HEALTH QUESTIONNAIRE - PHQ9
1. LITTLE INTEREST OR PLEASURE IN DOING THINGS: NOT AT ALL
SUM OF ALL RESPONSES TO PHQ QUESTIONS 1-9: 0
SUM OF ALL RESPONSES TO PHQ QUESTIONS 1-9: 0
2. FEELING DOWN, DEPRESSED OR HOPELESS: NOT AT ALL
SUM OF ALL RESPONSES TO PHQ QUESTIONS 1-9: 0
SUM OF ALL RESPONSES TO PHQ QUESTIONS 1-9: 0

## 2025-03-24 NOTE — PROGRESS NOTES
HPI  Jose Clarke 54 y.o. male  presents to the office today for follow up on chronic conditions.     Blood pressure 113/73, pulse 99, temperature 97.1 °F (36.2 °C), temperature source Temporal, resp. rate 18, height 1.905 m (6' 3\"), weight 94.3 kg (208 lb), SpO2 96%. Body mass index is 26 kg/m².   Chief Complaint   Patient presents with    Cholesterol Problem    Thyroid Problem      Pt saw endocrinologist, Dr. Rincon last week and had labs checked. His results will be scanned into his chart. Pt reports his thyroid labs were good. His fasting glucose was 99 and A1c was 5.9.   His grandfather had diabetes. He was advised to work on his diet to help reduce glucose. He is also trying to get more cardio exercise.     He is no longer taking atorvastatin due to side effects-- brain fog and myalgia.     ADD: Pt continues with Vyvanse 40mg daily. He usually gets the generic, but sometimes gets the brand name when pharmacy is out of the generic. He notes he actually prefers the brand name and feels a difference when taking it compared to the generic.     Health Maintenance:   Tdap: due, would like booster today  Flu Shot: did not get this season, advised to get 10/2025  COVID Booster: did not get this season, advised to get 10/2025  Heplisav-B: due, he will consider   PCV20: due, he would like today  Colonoscopy: 2024, Dr. Sevilla, f/u in 5 years    Current Outpatient Medications   Medication Sig Dispense Refill    Lisdexamfetamine Dimesylate 40 MG CAPS Take 40 mg by mouth daily for 30 days. Max Daily Amount: 40 mg 30 capsule 0    Cyanocobalamin (VITAMIN B 12) 500 MCG TABS Take by mouth      Cholecalciferol 50 MCG (2000 UT) CAPS Take by mouth daily      levothyroxine (SYNTHROID) 137 MCG tablet Take 1 tablet by mouth every morning (before breakfast)      Lisdexamfetamine Dimesylate 40 MG CAPS Take 40 mg by mouth daily for 30 days. Max Daily Amount: 40 mg 30 capsule 0    Lisdexamfetamine Dimesylate 40 MG CAPS Take 40 mg

## 2025-03-24 NOTE — PROGRESS NOTES
Chief Complaint   Patient presents with    Cholesterol Problem    Thyroid Problem     \"Have you been to the ER, urgent care clinic since your last visit?  Hospitalized since your last visit?\"      2 month Urgent DX; [facility]     “Have you seen or consulted any other health care providers outside of [facility] since your last visit?”    NO                 3/24/2025    10:05 AM   PHQ-9    Little interest or pleasure in doing things 0   Feeling down, depressed, or hopeless 0   PHQ-2 Score 0   PHQ-9 Total Score 0           Financial Resource Strain: Low Risk  (9/3/2024)    Overall Financial Resource Strain (CARDIA)     Difficulty of Paying Living Expenses: Not hard at all      Food Insecurity: No Food Insecurity (3/24/2025)    Hunger Vital Sign     Worried About Running Out of Food in the Last Year: Never true     Ran Out of Food in the Last Year: Never true          Health Maintenance Due   Topic Date Due    Hepatitis B vaccine (1 of 3 - 19+ 3-dose series) Never done    DTaP/Tdap/Td vaccine (1 - Tdap) 09/01/2016    Pneumococcal 50+ years Vaccine (1 of 1 - PCV) Never done    Flu vaccine (1) 08/01/2024    COVID-19 Vaccine (4 - 2024-25 season) 09/01/2024    Lipids  03/19/2025

## 2025-04-28 ENCOUNTER — PATIENT MESSAGE (OUTPATIENT)
Age: 55
End: 2025-04-28

## 2025-04-29 ENCOUNTER — OFFICE VISIT (OUTPATIENT)
Age: 55
End: 2025-04-29
Payer: COMMERCIAL

## 2025-04-29 ENCOUNTER — TELEPHONE (OUTPATIENT)
Age: 55
End: 2025-04-29

## 2025-04-29 VITALS
HEART RATE: 80 BPM | DIASTOLIC BLOOD PRESSURE: 64 MMHG | BODY MASS INDEX: 25.79 KG/M2 | WEIGHT: 207.4 LBS | RESPIRATION RATE: 18 BRPM | HEIGHT: 75 IN | TEMPERATURE: 98.4 F | OXYGEN SATURATION: 98 % | SYSTOLIC BLOOD PRESSURE: 103 MMHG

## 2025-04-29 DIAGNOSIS — S30.861A TICK BITE OF ABDOMINAL WALL, INITIAL ENCOUNTER: Primary | ICD-10-CM
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
## 2025-04-29 PROCEDURE — 99213 OFFICE O/P EST LOW 20 MIN: CPT | Performed by: FAMILY MEDICINE

## 2025-04-29 RX ORDER — DOXYCYCLINE HYCLATE 100 MG
100 TABLET ORAL 2 TIMES DAILY
Qty: 30 TABLET | Refills: 0 | Status: SHIPPED | OUTPATIENT
Start: 2025-04-29 | End: 2025-05-14

## 2025-04-29 ASSESSMENT — ENCOUNTER SYMPTOMS
ABDOMINAL PAIN: 0
SHORTNESS OF BREATH: 0

## 2025-04-29 NOTE — TELEPHONE ENCOUNTER
Called, left vm for pt to return call to office.  Message left for patient to call due to his concerns. Tick bits. Left a message for him to go to ER or Urgent Care to be assessed.

## 2025-04-29 NOTE — PROGRESS NOTES
Jose Clarke 54 y.o. male  presents to the office today Tick Bite    Blood pressure 103/64, pulse 80, temperature 98.4 °F (36.9 °C), temperature source Temporal, resp. rate 18, height 1.905 m (6' 3\"), weight 94.1 kg (207 lb 6.4 oz), SpO2 98%. Body mass index is 25.92 kg/m².   Chief Complaint   Patient presents with    Insect Bite     Tick bites 4 days ago        History of Present Illness  The patient presents for evaluation of tick bites.    He reports a recent encounter with ticks, which he discovered on his waistline after an hour-long car journey to [place]. Despite the application of tick repellent throughout the day, he suspects the ticks may have attached themselves during a brief 5-minute period when he was seated on the ground while changing his shoes. The ticks were removed using fine-tipped tweezers on 04/25/2025. Alcohol and cortisone have been applied twice daily to the affected area, resulting in the resolution of one of the welts. However, the other welt remains persistent, characterized by induration and erythema. A history of Lyme disease from high school is noted, although no joint aches or pains were experienced at that time.      Current Outpatient Medications   Medication Sig Dispense Refill    doxycycline hyclate (VIBRA-TABS) 100 MG tablet Take 1 tablet by mouth 2 times daily for 15 days 30 tablet 0    Lisdexamfetamine Dimesylate 40 MG CAPS Take 40 mg by mouth daily for 30 days. Max Daily Amount: 40 mg 30 capsule 0    [START ON 5/6/2025] Lisdexamfetamine Dimesylate 40 MG CAPS Take 40 mg by mouth daily for 30 days. Max Daily Amount: 40 mg 30 capsule 0    [START ON 6/5/2025] Lisdexamfetamine Dimesylate 40 MG CAPS Take 40 mg by mouth daily for 30 days. Max Daily Amount: 40 mg 30 capsule 0    Cyanocobalamin (VITAMIN B 12) 500 MCG TABS Take by mouth      Cholecalciferol 50 MCG (2000 UT) CAPS Take by mouth daily      levothyroxine (SYNTHROID) 137 MCG tablet Take 1 tablet by mouth every

## 2025-04-29 NOTE — PROGRESS NOTES
Chief Complaint   Patient presents with    Insect Bite     Tick bites 4 days ago         \"Have you been to the ER, urgent care clinic since your last visit?  Hospitalized since your last visit?\"    NO    “Have you seen or consulted any other health care providers outside of [facility] since your last visit?”    NO          Click Here for Release of Records Request           3/24/2025    10:05 AM   PHQ-9    Little interest or pleasure in doing things 0   Feeling down, depressed, or hopeless 0   PHQ-2 Score 0   PHQ-9 Total Score 0           Financial Resource Strain: Low Risk  (9/3/2024)    Overall Financial Resource Strain (CARDIA)     Difficulty of Paying Living Expenses: Not hard at all      Food Insecurity: No Food Insecurity (3/24/2025)    Hunger Vital Sign     Worried About Running Out of Food in the Last Year: Never true     Ran Out of Food in the Last Year: Never true          Health Maintenance Due   Topic Date Due    Hepatitis B vaccine (1 of 3 - 19+ 3-dose series) Never done    COVID-19 Vaccine (4 - 2024-25 season) 09/01/2024

## 2025-07-08 DIAGNOSIS — F98.8 ATTENTION DEFICIT DISORDER (ADD) WITHOUT HYPERACTIVITY: ICD-10-CM

## 2025-07-09 RX ORDER — LISDEXAMFETAMINE DIMESYLATE 40 MG/1
40 CAPSULE ORAL DAILY
Qty: 30 CAPSULE | Refills: 0 | Status: SHIPPED | OUTPATIENT
Start: 2025-07-09 | End: 2025-08-08

## 2025-07-09 NOTE — TELEPHONE ENCOUNTER
PCP: Vern Kay MD    Last appt: 4/29/2025     Future Appointments   Date Time Provider Department Center   7/15/2025  1:45 PM Vern Kay MD [facility] DEP       Requested Prescriptions     Pending Prescriptions Disp Refills    Lisdexamfetamine Dimesylate 40 MG CAPS 30 capsule 0     Sig: Take 40 mg by mouth daily for 30 days. Max Daily Amount: 40 mg       Prior labs and Blood pressures:  BP Readings from Last 3 Encounters:   04/29/25 103/64   03/24/25 113/73   12/18/23 118/70     Lab Results   Component Value Date/Time     06/26/2023 11:54 AM    K 4.3 06/26/2023 11:54 AM     06/26/2023 11:54 AM    CO2 28 06/26/2023 11:54 AM    BUN 20 06/26/2023 11:54 AM    GFRAA >60 06/27/2022 10:31 AM     No results found for: \"HBA1C\", \"[id number]\"  Lab Results   Component Value Date/Time    CHOL 230 06/26/2023 11:54 AM    HDL 56 06/26/2023 11:54 AM    .6 06/26/2023 11:54 AM    VLDL 15.4 06/26/2023 11:54 AM     No results found for: \"VITD3\"    Lab Results   Component Value Date/Time    TSH 1.01 01/16/2023 09:49 AM

## 2025-07-15 ENCOUNTER — OFFICE VISIT (OUTPATIENT)
Age: 55
End: 2025-07-15
Payer: COMMERCIAL

## 2025-07-15 VITALS
OXYGEN SATURATION: 97 % | RESPIRATION RATE: 17 BRPM | HEIGHT: 75 IN | TEMPERATURE: 97.1 F | DIASTOLIC BLOOD PRESSURE: 80 MMHG | HEART RATE: 67 BPM | BODY MASS INDEX: 25.56 KG/M2 | SYSTOLIC BLOOD PRESSURE: 120 MMHG | WEIGHT: 205.6 LBS

## 2025-07-15 DIAGNOSIS — F98.8 ATTENTION DEFICIT DISORDER (ADD) WITHOUT HYPERACTIVITY: Primary | ICD-10-CM

## 2025-07-15 PROCEDURE — 99213 OFFICE O/P EST LOW 20 MIN: CPT | Performed by: FAMILY MEDICINE

## 2025-07-15 RX ORDER — LISDEXAMFETAMINE DIMESYLATE 40 MG/1
40 TABLET, CHEWABLE ORAL DAILY
Qty: 30 TABLET | Refills: 0 | Status: SHIPPED | OUTPATIENT
Start: 2025-07-15 | End: 2025-08-14

## 2025-07-15 RX ORDER — LISDEXAMFETAMINE DIMESYLATE 40 MG/1
40 TABLET, CHEWABLE ORAL DAILY
Qty: 30 TABLET | Refills: 0 | Status: SHIPPED | OUTPATIENT
Start: 2025-09-13 | End: 2025-10-13

## 2025-07-15 RX ORDER — LISDEXAMFETAMINE DIMESYLATE 40 MG/1
40 TABLET, CHEWABLE ORAL DAILY
Qty: 30 TABLET | Refills: 0 | Status: SHIPPED | OUTPATIENT
Start: 2025-08-14 | End: 2025-09-13

## 2025-07-15 ASSESSMENT — ENCOUNTER SYMPTOMS
ABDOMINAL PAIN: 0
SHORTNESS OF BREATH: 0

## 2025-07-15 ASSESSMENT — PATIENT HEALTH QUESTIONNAIRE - PHQ9
SUM OF ALL RESPONSES TO PHQ QUESTIONS 1-9: 0
SUM OF ALL RESPONSES TO PHQ QUESTIONS 1-9: 0
1. LITTLE INTEREST OR PLEASURE IN DOING THINGS: NOT AT ALL
SUM OF ALL RESPONSES TO PHQ QUESTIONS 1-9: 0
SUM OF ALL RESPONSES TO PHQ QUESTIONS 1-9: 0
2. FEELING DOWN, DEPRESSED OR HOPELESS: NOT AT ALL

## 2025-07-15 NOTE — PROGRESS NOTES
Chief Complaint   Patient presents with    Medication Check     Vyvanse         \"Have you been to the ER, urgent care clinic since your last visit?  Hospitalized since your last visit?\"    NO    “Have you seen or consulted any other health care providers outside of [facility] since your last visit?”    NO          Click Here for Release of Records Request           7/15/2025     1:56 PM   PHQ-9    Little interest or pleasure in doing things 0   Feeling down, depressed, or hopeless 0   PHQ-2 Score 0   PHQ-9 Total Score 0           Financial Resource Strain: Low Risk  (9/3/2024)    Overall Financial Resource Strain (CARDIA)     Difficulty of Paying Living Expenses: Not hard at all      Food Insecurity: No Food Insecurity (3/24/2025)    Hunger Vital Sign     Worried About Running Out of Food in the Last Year: Never true     Ran Out of Food in the Last Year: Never true          Health Maintenance Due   Topic Date Due    Hepatitis B vaccine (1 of 3 - 19+ 3-dose series) Never done    COVID-19 Vaccine (4 - 2024-25 season) 09/01/2024       
12) 500 MCG TABS Take by mouth      Cholecalciferol 50 MCG (2000 UT) CAPS Take by mouth daily      levothyroxine (SYNTHROID) 137 MCG tablet Take 1 tablet by mouth every morning (before breakfast)      Lisdexamfetamine Dimesylate 40 MG CAPS Take 40 mg by mouth daily for 30 days. Max Daily Amount: 40 mg 30 capsule 0    Lisdexamfetamine Dimesylate 40 MG CAPS Take 40 mg by mouth daily for 30 days. Max Daily Amount: 40 mg 30 capsule 0    Lisdexamfetamine Dimesylate 40 MG CAPS Take 40 mg by mouth daily for 30 days. Max Daily Amount: 40 mg 30 capsule 0    Lisdexamfetamine Dimesylate 40 MG CAPS Take 40 mg by mouth daily for 30 days. Max Daily Amount: 40 mg 30 capsule 0    Lisdexamfetamine Dimesylate 40 MG CAPS Take 40 mg by mouth daily for 30 days. Max Daily Amount: 40 mg 30 capsule 0    Lisdexamfetamine Dimesylate 40 MG CAPS Take 40 mg by mouth daily for 30 days. Max Daily Amount: 40 mg 30 capsule 0     No current facility-administered medications for this visit.     Allergies   Allergen Reactions    Soy Anaphylaxis     Past Medical History:   Diagnosis Date    ADHD (attention deficit hyperactivity disorder)     Hypercholesterolemia     Lipoma of right upper extremity 07/27/2022    Thyroid disease      Past Surgical History:   Procedure Laterality Date    GI      COLONOSCOPY    PRE-MALIGNANT / BENIGN SKIN LESION EXCISION  01/13/2023    Lipomas of the right upper arm Dr Cliff Turk    VASECTOMY       Family History   Problem Relation Age of Onset    Migraines Sister     Thyroid Disease Sister     High Cholesterol Father     Heart Attack Father      Social History     Tobacco Use    Smoking status: Never     Passive exposure: Never    Smokeless tobacco: Never   Substance Use Topics    Alcohol use: Yes     Alcohol/week: 5.0 standard drinks of alcohol     Types: 5 Glasses of wine per week        Review of Systems   Constitutional:  Negative for activity change, appetite change and fatigue.   Eyes:  Negative for visual

## 2025-07-24 DIAGNOSIS — F98.8 ATTENTION DEFICIT DISORDER (ADD) WITHOUT HYPERACTIVITY: ICD-10-CM

## 2025-07-24 RX ORDER — LISDEXAMFETAMINE DIMESYLATE 40 MG/1
40 TABLET, CHEWABLE ORAL DAILY
Qty: 30 TABLET | Refills: 0 | Status: CANCELLED | OUTPATIENT
Start: 2025-07-24 | End: 2025-08-23

## 2025-07-25 NOTE — TELEPHONE ENCOUNTER
PCP: Vern Kay MD    Last appt: 7/15/2025     Future Appointments   Date Time Provider Department Center   11/4/2025 11:00 AM Vern Kay MD [facility] DEP       Requested Prescriptions     Pending Prescriptions Disp Refills    Lisdexamfetamine Dimesylate (VYVANSE) 40 MG CHEW 30 tablet 0     Sig: Take 40 mg by mouth daily for 30 days. Name Brand only Max Daily Amount: 40 mg       Prior labs and Blood pressures:  BP Readings from Last 3 Encounters:   07/15/25 120/80   04/29/25 103/64   03/24/25 113/73     Lab Results   Component Value Date/Time     06/26/2023 11:54 AM    K 4.3 06/26/2023 11:54 AM     06/26/2023 11:54 AM    CO2 28 06/26/2023 11:54 AM    BUN 20 06/26/2023 11:54 AM    GFRAA >60 06/27/2022 10:31 AM     No results found for: \"HBA1C\", \"[id number]\"  Lab Results   Component Value Date/Time    CHOL 230 06/26/2023 11:54 AM    HDL 56 06/26/2023 11:54 AM    .6 06/26/2023 11:54 AM    VLDL 15.4 06/26/2023 11:54 AM     No results found for: \"VITD3\"    Lab Results   Component Value Date/Time    TSH 1.01 01/16/2023 09:49 AM

## 2025-07-27 RX ORDER — LISDEXAMFETAMINE DIMESYLATE 40 MG/1
40 CAPSULE ORAL DAILY
Qty: 30 CAPSULE | Refills: 0 | Status: SHIPPED | OUTPATIENT
Start: 2025-07-27 | End: 2025-08-26

## (undated) DEVICE — SUTURE MCRYL SZ 4-0 L18IN ABSRB UD L19MM PS-2 3/8 CIR PRIM Y496G

## (undated) DEVICE — APPLICATOR MEDICATED 26 CC SOLUTION HI LT ORNG CHLORAPREP

## (undated) DEVICE — ELECTRODE PT RET AD L9FT HI MOIST COND ADH HYDRGEL CORDED

## (undated) DEVICE — SPONGE GZ W4XL4IN COT 12 PLY TYP VII WVN C FLD DSGN STERILE

## (undated) DEVICE — STOCKINETTE,IMPERVIOUS,12X48,STERILE: Brand: MEDLINE

## (undated) DEVICE — PENCIL SMK EVAC 10 FT BLADE ELECTRD ROCKER FOR TELSCP

## (undated) DEVICE — DRAPE,REIN 53X77,STERILE: Brand: MEDLINE

## (undated) DEVICE — SUTURE VCRL SZ 3-0 L27IN ABSRB VLT L26MM SH 1/2 CIR J316H

## (undated) DEVICE — BASIN ST MAJOR-NO CAUTERY: Brand: MEDLINE INDUSTRIES, INC.

## (undated) DEVICE — BANDAGE COBAN 4 IN COMPR W4INXL5YD FOAM COHESIVE QUIK STK SELF ADH SFT

## (undated) DEVICE — TOWEL SURG W17XL27IN STD BLU COT NONFENESTRATED PREWASHED

## (undated) DEVICE — PACK,BASIC,SIRUS,V: Brand: MEDLINE

## (undated) DEVICE — DRAPE,LAPAROTOMY,T,PEDI,STERILE: Brand: MEDLINE

## (undated) DEVICE — HYPODERMIC SAFETY NEEDLE: Brand: MONOJECT

## (undated) DEVICE — SYRINGE MED 10ML LUERLOCK TIP W/O SFTY DISP

## (undated) DEVICE — ADHESIVE SKIN CLSR 0.7ML TOP DERMBND ADV

## (undated) DEVICE — SOLUTION IRRIG 1000ML 0.9% SOD CHL USP POUR PLAS BTL

## (undated) DEVICE — GLOVE ORANGE PI 8   MSG9080